# Patient Record
Sex: MALE | Race: WHITE | Employment: UNEMPLOYED | URBAN - NONMETROPOLITAN AREA
[De-identification: names, ages, dates, MRNs, and addresses within clinical notes are randomized per-mention and may not be internally consistent; named-entity substitution may affect disease eponyms.]

---

## 2018-09-28 ENCOUNTER — HOSPITAL ENCOUNTER (EMERGENCY)
Age: 38
Discharge: HOME | End: 2018-09-28
Payer: COMMERCIAL

## 2018-09-28 ENCOUNTER — OFFICE VISIT (OUTPATIENT)
Dept: URGENT CARE | Age: 38
End: 2018-09-28

## 2018-09-28 VITALS
BODY MASS INDEX: 24.78 KG/M2 | HEART RATE: 68 BPM | DIASTOLIC BLOOD PRESSURE: 95 MMHG | RESPIRATION RATE: 22 BRPM | HEIGHT: 71 IN | OXYGEN SATURATION: 97 % | SYSTOLIC BLOOD PRESSURE: 142 MMHG | TEMPERATURE: 97.8 F | WEIGHT: 177 LBS

## 2018-09-28 VITALS
BODY MASS INDEX: 24.78 KG/M2 | TEMPERATURE: 98.2 F | HEART RATE: 78 BPM | OXYGEN SATURATION: 95 % | HEIGHT: 71 IN | SYSTOLIC BLOOD PRESSURE: 158 MMHG | DIASTOLIC BLOOD PRESSURE: 98 MMHG | WEIGHT: 177 LBS | RESPIRATION RATE: 16 BRPM

## 2018-09-28 DIAGNOSIS — M54.30 BACK PAIN WITH SCIATICA: Primary | ICD-10-CM

## 2018-09-28 DIAGNOSIS — M54.9 BACK PAIN WITH SCIATICA: Primary | ICD-10-CM

## 2018-09-28 DIAGNOSIS — M54.5 LOW BACK PAIN, UNSPECIFIED BACK PAIN LATERALITY, UNSPECIFIED CHRONICITY, WITH SCIATICA PRESENCE UNSPECIFIED: Primary | ICD-10-CM

## 2018-09-28 DIAGNOSIS — R32 URINARY INCONTINENCE, UNSPECIFIED TYPE: ICD-10-CM

## 2018-09-28 LAB
BILIRUBIN URINE: NEGATIVE
BLOOD, URINE: NEGATIVE
CLARITY: CLEAR
COLOR: YELLOW
GLUCOSE URINE: NEGATIVE MG/DL
KETONES, URINE: NEGATIVE MG/DL
LEUKOCYTE ESTERASE, URINE: NEGATIVE
NITRITE, URINE: NEGATIVE
PH UA: 5.5
PROTEIN UA: NEGATIVE MG/DL
SPECIFIC GRAVITY UA: 1.01
URINE REFLEX TO CULTURE: NORMAL
UROBILINOGEN, URINE: 0.2 E.U./DL

## 2018-09-28 PROCEDURE — 99283 EMERGENCY DEPT VISIT LOW MDM: CPT

## 2018-09-28 PROCEDURE — 81003 URINALYSIS AUTO W/O SCOPE: CPT

## 2018-09-28 PROCEDURE — 51798 US URINE CAPACITY MEASURE: CPT

## 2018-09-28 PROCEDURE — 99285 EMERGENCY DEPT VISIT HI MDM: CPT | Performed by: NURSE PRACTITIONER

## 2018-09-28 PROCEDURE — 99999 PR OFFICE/OUTPT VISIT,PROCEDURE ONLY: CPT | Performed by: NURSE PRACTITIONER

## 2018-09-28 PROCEDURE — 6370000000 HC RX 637 (ALT 250 FOR IP): Performed by: NURSE PRACTITIONER

## 2018-09-28 PROCEDURE — 96372 THER/PROPH/DIAG INJ SC/IM: CPT

## 2018-09-28 PROCEDURE — 6360000002 HC RX W HCPCS: Performed by: NURSE PRACTITIONER

## 2018-09-28 RX ORDER — MORPHINE SULFATE 10 MG/ML
4 INJECTION, SOLUTION INTRAMUSCULAR; INTRAVENOUS ONCE
Status: COMPLETED | OUTPATIENT
Start: 2018-09-28 | End: 2018-09-28

## 2018-09-28 RX ORDER — METHOCARBAMOL 500 MG/1
500 TABLET, FILM COATED ORAL 3 TIMES DAILY
Qty: 20 TABLET | Refills: 0 | Status: SHIPPED | OUTPATIENT
Start: 2018-09-28 | End: 2018-10-05

## 2018-09-28 RX ORDER — DEXAMETHASONE SODIUM PHOSPHATE 10 MG/ML
10 INJECTION, SOLUTION INTRAMUSCULAR; INTRAVENOUS ONCE
Status: COMPLETED | OUTPATIENT
Start: 2018-09-28 | End: 2018-09-28

## 2018-09-28 RX ORDER — PROMETHAZINE HYDROCHLORIDE 25 MG/ML
25 INJECTION, SOLUTION INTRAMUSCULAR; INTRAVENOUS ONCE
Status: COMPLETED | OUTPATIENT
Start: 2018-09-28 | End: 2018-09-28

## 2018-09-28 RX ORDER — HYDROCODONE BITARTRATE AND ACETAMINOPHEN 5; 325 MG/1; MG/1
1 TABLET ORAL EVERY 6 HOURS PRN
Qty: 10 TABLET | Refills: 0 | Status: SHIPPED | OUTPATIENT
Start: 2018-09-28 | End: 2018-10-01

## 2018-09-28 RX ORDER — ONDANSETRON 4 MG/1
4 TABLET, ORALLY DISINTEGRATING ORAL EVERY 8 HOURS PRN
Qty: 15 TABLET | Refills: 0 | Status: SHIPPED | OUTPATIENT
Start: 2018-09-28 | End: 2018-10-13

## 2018-09-28 RX ORDER — METHYLPREDNISOLONE 4 MG/1
TABLET ORAL
Qty: 1 KIT | Refills: 0 | Status: SHIPPED | OUTPATIENT
Start: 2018-09-28 | End: 2018-10-04

## 2018-09-28 RX ORDER — LIDOCAINE 50 MG/G
1 PATCH TOPICAL DAILY
Status: DISCONTINUED | OUTPATIENT
Start: 2018-09-28 | End: 2018-09-28 | Stop reason: HOSPADM

## 2018-09-28 RX ADMIN — MORPHINE SULFATE 4 MG: 10 INJECTION INTRAVENOUS at 11:04

## 2018-09-28 RX ADMIN — DEXAMETHASONE SODIUM PHOSPHATE 10 MG: 10 INJECTION, SOLUTION INTRAMUSCULAR; INTRAVENOUS at 11:04

## 2018-09-28 RX ADMIN — PROMETHAZINE HYDROCHLORIDE 25 MG: 25 INJECTION INTRAMUSCULAR; INTRAVENOUS at 11:04

## 2018-09-28 ASSESSMENT — PAIN DESCRIPTION - ORIENTATION: ORIENTATION: RIGHT

## 2018-09-28 ASSESSMENT — PAIN SCALES - GENERAL
PAINLEVEL_OUTOF10: 6
PAINLEVEL_OUTOF10: 7

## 2018-09-28 ASSESSMENT — PAIN DESCRIPTION - LOCATION: LOCATION: BACK;LEG

## 2018-09-28 ASSESSMENT — PAIN DESCRIPTION - DESCRIPTORS: DESCRIPTORS: DULL;ACHING

## 2018-09-28 ASSESSMENT — PAIN DESCRIPTION - PAIN TYPE: TYPE: ACUTE PAIN

## 2018-09-28 ASSESSMENT — ENCOUNTER SYMPTOMS
GASTROINTESTINAL NEGATIVE: 1
RESPIRATORY NEGATIVE: 1
BACK PAIN: 1

## 2018-09-28 NOTE — PROGRESS NOTES
Gracia Lopez presents today with a complaint of lower back pain. The main concern is that he had an episode of incontinence yesterday. He has never had that before. He rates his pain currently a 7/10, but has been 10/10. Advised that patient needs a higher level of care and to go to the ER for further evaluation and treatment. His GF is with him and she agrees to drive him. He declined ambulance. Left in stable condition.

## 2018-09-28 NOTE — PROGRESS NOTES
Patient came into clinic with girlfriend, [facility]. Had c/o right side lower back pain that radiates down leg in to arch of foot. Said that symptoms started approx. 2 weeks ago. Currently rates pain at a level of 7. Said that the toes on his right foot go numb at times. Also states that he had an episode of incontinence yesterday and didn't even feel it until it happened. Obtained vital signs and consulted with provider, Luna Champion. She came and spoke with patient, recommended that he be evaluated at the E.D. Patient was agreeable to this. Declined ambulance, patient's girlfriend stated that she would drive him.  Patient left clinic in stable condition.--HC, LPN

## 2018-09-28 NOTE — ED PROVIDER NOTES
available at the time of this note:    No orders to display         ED BEDSIDE ULTRASOUND:   Performed by ED Physician - none    LABS:  Labs Reviewed   URINE RT REFLEX TO CULTURE       All other labs were within normal range or not returned as of this dictation. RE-ASSESSMENT     Pt with normal post void residual because of this due not believe pt with neurogenic bladder. Symptoms improved after medication he is ambulating prior to discharge without ataxia or antalgic gait. EMERGENCY DEPARTMENT COURSE and DIFFERENTIAL DIAGNOSIS/MDM:   Vitals:    Vitals:    09/28/18 1011   BP: (!) 158/98   Pulse: 78   Resp: 16   Temp: 98.2 °F (36.8 °C)   TempSrc: Oral   SpO2: 95%   Weight: 177 lb (80.3 kg)   Height: 5' 11\" (1.803 m)       MDM      CONSULTS:  None    PROCEDURES:  Unless otherwise noted below, none     Procedures    FINAL IMPRESSION      1. Back pain with sciatica    2. Urinary incontinence, unspecified type          DISPOSITION/PLAN   DISPOSITION        PATIENT REFERRED TO:  Jeri Anguiano [id number]  [phone]    Schedule an appointment as soon as possible for a visit         DISCHARGE MEDICATIONS:    Attestation: The Prescription Monitoring Report for this patient was reviewed today. ([id number]) MICHAEL Arce)  Documentation: No signs of potential drug abuse or diversion identified. MICHAEL Arce)  Discharge Medication List as of 9/28/2018 12:37 PM           Medication List      START taking these medications    HYDROcodone-acetaminophen 5-325 MG per tablet  Commonly known as:  NORCO  Take 1 tablet by mouth every 6 hours as needed for Pain for up to 3 days. Intended supply: 3 days. Take lowest dose possible to manage pain. methocarbamol 500 MG tablet  Commonly known as:  ROBAXIN  Take 1 tablet by mouth 3 times daily for 7 days     methylPREDNISolone 4 MG tablet  Commonly known as:  MEDROL (ALBERTO)  Take by mouth.      ondansetron 4 MG disintegrating tablet  Commonly known as:  ZOFRAN ODT  Take 1 tablet by mouth every 8 hours as needed for Nausea or Vomiting           Where to Get Your Medications      You can get these medications from any pharmacy    Bring a paper prescription for each of these medications  · HYDROcodone-acetaminophen 5-325 MG per tablet  · methocarbamol 500 MG tablet  · methylPREDNISolone 4 MG tablet  · ondansetron 4 MG disintegrating tablet         (Please note that portions of this note were completed with a voice recognition program.  Efforts were made to edit the dictations but occasionally words are mis-transcribed.)              Lakshmi Cerda, MICHAEL  09/28/18 0572

## 2018-10-24 ENCOUNTER — HOSPITAL ENCOUNTER (INPATIENT)
Age: 38
LOS: 5 days | Discharge: HOME | DRG: 897 | End: 2018-10-29
Attending: FAMILY MEDICINE | Admitting: PSYCHIATRY & NEUROLOGY
Payer: COMMERCIAL

## 2018-10-24 DIAGNOSIS — R45.851 SUICIDAL IDEATION: Primary | ICD-10-CM

## 2018-10-24 DIAGNOSIS — F32.0 CURRENT MILD EPISODE OF MAJOR DEPRESSIVE DISORDER WITHOUT PRIOR EPISODE (HCC): ICD-10-CM

## 2018-10-24 LAB
ALBUMIN SERPL-MCNC: 4.7 G/DL (ref 3.5–5.2)
ALP BLD-CCNC: 82 U/L (ref 40–130)
ALT SERPL-CCNC: 31 U/L (ref 5–41)
AMPHETAMINE SCREEN, URINE: NEGATIVE
ANION GAP SERPL CALCULATED.3IONS-SCNC: 15 MMOL/L (ref 7–19)
AST SERPL-CCNC: 21 U/L (ref 5–40)
BACTERIA: NEGATIVE /HPF
BARBITURATE SCREEN URINE: NEGATIVE
BASOPHILS ABSOLUTE: 0 K/UL (ref 0–0.2)
BASOPHILS RELATIVE PERCENT: 0.1 % (ref 0–1)
BENZODIAZEPINE SCREEN, URINE: NEGATIVE
BILIRUB SERPL-MCNC: 0.7 MG/DL (ref 0.2–1.2)
BILIRUBIN URINE: ABNORMAL
BLOOD, URINE: NEGATIVE
BUN BLDV-MCNC: 6 MG/DL (ref 6–20)
CALCIUM SERPL-MCNC: 10 MG/DL (ref 8.6–10)
CANNABINOID SCREEN URINE: NEGATIVE
CHLORIDE BLD-SCNC: 101 MMOL/L (ref 98–111)
CLARITY: ABNORMAL
CO2: 24 MMOL/L (ref 22–29)
COCAINE METABOLITE SCREEN URINE: NEGATIVE
COLOR: ABNORMAL
CREAT SERPL-MCNC: 0.9 MG/DL (ref 0.5–1.2)
EOSINOPHILS ABSOLUTE: 0.1 K/UL (ref 0–0.6)
EOSINOPHILS RELATIVE PERCENT: 1 % (ref 0–5)
EPITHELIAL CELLS, UA: 2 /HPF (ref 0–5)
ETHANOL: <10 MG/DL (ref 0–0.08)
GFR NON-AFRICAN AMERICAN: >60
GLUCOSE BLD-MCNC: 135 MG/DL (ref 74–109)
GLUCOSE URINE: NEGATIVE MG/DL
HCT VFR BLD CALC: 48.3 % (ref 42–52)
HEMOGLOBIN: 16.2 G/DL (ref 14–18)
HYALINE CASTS: 27 /HPF (ref 0–8)
KETONES, URINE: ABNORMAL MG/DL
LEUKOCYTE ESTERASE, URINE: NEGATIVE
LIPASE: 23 U/L (ref 13–60)
LYMPHOCYTES ABSOLUTE: 1.4 K/UL (ref 1.1–4.5)
LYMPHOCYTES RELATIVE PERCENT: 20.4 % (ref 20–40)
Lab: NORMAL
MCH RBC QN AUTO: 31.2 PG (ref 27–31)
MCHC RBC AUTO-ENTMCNC: 33.5 G/DL (ref 33–37)
MCV RBC AUTO: 93.1 FL (ref 80–94)
MONOCYTES ABSOLUTE: 0.7 K/UL (ref 0–0.9)
MONOCYTES RELATIVE PERCENT: 10.4 % (ref 0–10)
NEUTROPHILS ABSOLUTE: 4.6 K/UL (ref 1.5–7.5)
NEUTROPHILS RELATIVE PERCENT: 67.8 % (ref 50–65)
NITRITE, URINE: NEGATIVE
OPIATE SCREEN URINE: NEGATIVE
PDW BLD-RTO: 11.6 % (ref 11.5–14.5)
PH UA: 6
PLATELET # BLD: 271 K/UL (ref 130–400)
PMV BLD AUTO: 10.4 FL (ref 9.4–12.4)
POTASSIUM REFLEX MAGNESIUM: 3.7 MMOL/L (ref 3.5–5)
PROTEIN UA: 30 MG/DL
RBC # BLD: 5.19 M/UL (ref 4.7–6.1)
RBC UA: 3 /HPF (ref 0–4)
SODIUM BLD-SCNC: 140 MMOL/L (ref 136–145)
SPECIFIC GRAVITY UA: 1.02
TOTAL PROTEIN: 7.3 G/DL (ref 6.6–8.7)
UROBILINOGEN, URINE: 1 E.U./DL
WBC # BLD: 6.9 K/UL (ref 4.8–10.8)
WBC UA: 3 /HPF (ref 0–5)

## 2018-10-24 PROCEDURE — 36415 COLL VENOUS BLD VENIPUNCTURE: CPT

## 2018-10-24 PROCEDURE — 83690 ASSAY OF LIPASE: CPT

## 2018-10-24 PROCEDURE — 6370000000 HC RX 637 (ALT 250 FOR IP): Performed by: PSYCHIATRY & NEUROLOGY

## 2018-10-24 PROCEDURE — 81001 URINALYSIS AUTO W/SCOPE: CPT

## 2018-10-24 PROCEDURE — 1240000000 HC EMOTIONAL WELLNESS R&B

## 2018-10-24 PROCEDURE — 99285 EMERGENCY DEPT VISIT HI MDM: CPT | Performed by: FAMILY MEDICINE

## 2018-10-24 PROCEDURE — G0480 DRUG TEST DEF 1-7 CLASSES: HCPCS

## 2018-10-24 PROCEDURE — 80053 COMPREHEN METABOLIC PANEL: CPT

## 2018-10-24 PROCEDURE — 80307 DRUG TEST PRSMV CHEM ANLYZR: CPT

## 2018-10-24 PROCEDURE — 85025 COMPLETE CBC W/AUTO DIFF WBC: CPT

## 2018-10-24 PROCEDURE — 99285 EMERGENCY DEPT VISIT HI MDM: CPT

## 2018-10-24 RX ORDER — NICOTINE 21 MG/24HR
1 PATCH, TRANSDERMAL 24 HOURS TRANSDERMAL DAILY
Status: DISCONTINUED | OUTPATIENT
Start: 2018-10-24 | End: 2018-10-29 | Stop reason: HOSPADM

## 2018-10-24 RX ORDER — NICOTINE 21 MG/24HR
PATCH, TRANSDERMAL 24 HOURS TRANSDERMAL
Status: DISCONTINUED
Start: 2018-10-24 | End: 2018-10-24

## 2018-10-24 RX ORDER — ACETAMINOPHEN 325 MG/1
650 TABLET ORAL EVERY 4 HOURS PRN
Status: DISCONTINUED | OUTPATIENT
Start: 2018-10-24 | End: 2018-10-29 | Stop reason: HOSPADM

## 2018-10-24 ASSESSMENT — ENCOUNTER SYMPTOMS
WHEEZING: 0
TROUBLE SWALLOWING: 0
BACK PAIN: 0
DIARRHEA: 0
COUGH: 0
CHEST TIGHTNESS: 0
SORE THROAT: 0
SHORTNESS OF BREATH: 0
CONSTIPATION: 0
APNEA: 0
VOMITING: 0
ABDOMINAL DISTENTION: 0
ABDOMINAL PAIN: 0

## 2018-10-24 ASSESSMENT — SLEEP AND FATIGUE QUESTIONNAIRES
DIFFICULTY ARISING: YES
RESTFUL SLEEP: NO
SLEEP PATTERN: RESTLESSNESS;INSOMNIA;DIFFICULTY FALLING ASLEEP
AVERAGE NUMBER OF SLEEP HOURS: 3
DIFFICULTY STAYING ASLEEP: YES
DO YOU HAVE DIFFICULTY SLEEPING: YES
DIFFICULTY FALLING ASLEEP: YES
DO YOU USE A SLEEP AID: NO

## 2018-10-24 ASSESSMENT — PAIN DESCRIPTION - PAIN TYPE: TYPE: CHRONIC PAIN

## 2018-10-24 ASSESSMENT — LIFESTYLE VARIABLES: HISTORY_ALCOHOL_USE: YES

## 2018-10-24 ASSESSMENT — PAIN SCALES - GENERAL: PAINLEVEL_OUTOF10: 5

## 2018-10-24 ASSESSMENT — PATIENT HEALTH QUESTIONNAIRE - PHQ9: SUM OF ALL RESPONSES TO PHQ QUESTIONS 1-9: 27

## 2018-10-24 ASSESSMENT — PAIN DESCRIPTION - LOCATION: LOCATION: BACK

## 2018-10-24 NOTE — ED NOTES
Danielle Smith with behavioral health at bedside       [facility]  10/24/18 6424

## 2018-10-24 NOTE — ED PROVIDER NOTES
History:   Procedure Laterality Date    LYMPHADENECTOMY Left     removed when was a child, early 80's    OTHER SURGICAL HISTORY       [address] AS A CHILD AFTER CAT SCRATCH         CURRENT MEDICATIONS     There are no discharge medications for this patient. ALLERGIES     Patient has no known allergies. FAMILY HISTORY       Family History   Problem Relation Age of Onset    Diabetes Father     Heart Disease Father           SOCIAL HISTORY       Social History     Social History    Marital status: Single     Spouse name: N/A    Number of children: N/A    Years of education: N/A     Social History Main Topics    Smoking status: Current Every Day Smoker     Packs/day: 2.00     Types: Cigarettes    Smokeless tobacco: Never Used    Alcohol use No    Drug use: No    Sexual activity: Not Asked     Other Topics Concern    None     Social History Narrative    None       SCREENINGS    [place] Coma Scale  Eye Opening: Spontaneous  Best Verbal Response: Oriented  Best Motor Response: Obeys commands  [place] Coma Scale Score: 15        PHYSICAL EXAM    (up to 7 for level 4, 8 or more for level 5)     ED Triage Vitals [10/24/18 1111]   BP Temp Temp Source Pulse Resp SpO2 Height Weight   (!) 138/95 98.6 °F (37 °C) Oral 98 16 97 % 5' 11\" (1.803 m) 177 lb (80.3 kg)       Physical Exam   Constitutional: He is oriented to person, place, and time. He appears well-developed and well-nourished. HENT:   Head: Normocephalic and atraumatic. Eyes: Pupils are equal, round, and reactive to light. Conjunctivae and EOM are normal.   Neck: Normal range of motion. Neck supple. No tracheal deviation present. Cardiovascular: Normal rate, regular rhythm, normal heart sounds and intact distal pulses. Pulmonary/Chest: Effort normal and breath sounds normal. No respiratory distress. He has no wheezes. He has no rales. Abdominal: Soft. Bowel sounds are normal.   Musculoskeletal: Normal range of motion. MDM  Number of Diagnoses or Management Options     Amount and/or Complexity of Data Reviewed  Clinical lab tests: ordered and reviewed    Risk of Complications, Morbidity, and/or Mortality  Presenting problems: moderate  Diagnostic procedures: moderate  Management options: moderate    Patient Progress  Patient progress: stable      Reassessment  Patient stable throughout ED stay. He is medically clear. Behavioral health interviewed patient. They will admit for further treatment. CONSULTS:  IP CONSULT TO PSYCHIATRY  IP CONSULT TO FAMILY MEDICINE    PROCEDURES:  Unless otherwise noted below, none     Procedures    FINAL IMPRESSION      1. Suicidal ideation    2. Current mild episode of major depressive disorder without prior episode [facility])          DISPOSITION/PLAN   DISPOSITION Decision To Admit 10/24/2018 01:35:53 PM      PATIENT REFERRED TO:  Hilario Bennett, [facility] [address]  [phone]            DISCHARGE MEDICATIONS:  There are no discharge medications for this patient.          (Please note that portions of this note were completed with a voice recognition program.  Efforts were made to edit thedictations but occasionally words are mis-transcribed.)    Tammie Pickens MD (electronically signed)  Attending Emergency Physician          Tammie Pickens MD  10/24/18 18 Alexey Perez MD  10/25/18 2095

## 2018-10-25 PROBLEM — R45.851 SUICIDAL IDEATION: Status: ACTIVE | Noted: 2018-10-25

## 2018-10-25 PROBLEM — F19.94 SUBSTANCE INDUCED MOOD DISORDER (HCC): Status: ACTIVE | Noted: 2018-10-25

## 2018-10-25 PROCEDURE — 90792 PSYCH DIAG EVAL W/MED SRVCS: CPT | Performed by: NURSE PRACTITIONER

## 2018-10-25 PROCEDURE — 1240000000 HC EMOTIONAL WELLNESS R&B

## 2018-10-25 PROCEDURE — 6370000000 HC RX 637 (ALT 250 FOR IP): Performed by: PSYCHIATRY & NEUROLOGY

## 2018-10-25 PROCEDURE — 6370000000 HC RX 637 (ALT 250 FOR IP): Performed by: NURSE PRACTITIONER

## 2018-10-25 RX ORDER — TRAZODONE HYDROCHLORIDE 50 MG/1
50 TABLET ORAL NIGHTLY PRN
Status: DISCONTINUED | OUTPATIENT
Start: 2018-10-25 | End: 2018-10-29 | Stop reason: HOSPADM

## 2018-10-25 RX ORDER — FOLIC ACID 1 MG/1
1 TABLET ORAL DAILY
Status: DISCONTINUED | OUTPATIENT
Start: 2018-10-25 | End: 2018-10-29 | Stop reason: HOSPADM

## 2018-10-25 RX ORDER — CHLORDIAZEPOXIDE HYDROCHLORIDE 25 MG/1
25 CAPSULE, GELATIN COATED ORAL EVERY 4 HOURS PRN
Status: DISCONTINUED | OUTPATIENT
Start: 2018-10-25 | End: 2018-10-29 | Stop reason: HOSPADM

## 2018-10-25 RX ORDER — DIVALPROEX SODIUM 500 MG/1
500 TABLET, DELAYED RELEASE ORAL EVERY 12 HOURS SCHEDULED
Status: DISCONTINUED | OUTPATIENT
Start: 2018-10-25 | End: 2018-10-29 | Stop reason: HOSPADM

## 2018-10-25 RX ORDER — THIAMINE MONONITRATE (VIT B1) 100 MG
100 TABLET ORAL DAILY
Status: DISCONTINUED | OUTPATIENT
Start: 2018-10-25 | End: 2018-10-29 | Stop reason: HOSPADM

## 2018-10-25 RX ADMIN — TRAZODONE HYDROCHLORIDE 50 MG: 50 TABLET ORAL at 20:39

## 2018-10-25 RX ADMIN — DIVALPROEX SODIUM 500 MG: 500 TABLET, DELAYED RELEASE ORAL at 20:39

## 2018-10-25 RX ADMIN — FOLIC ACID 1 MG: 1 TABLET ORAL at 21:14

## 2018-10-25 RX ADMIN — Medication 100 MG: at 21:13

## 2018-10-25 NOTE — PROGRESS NOTES
Patient is calm and cooperative with care. Patient is social and attends groups. Patient completed ADLs. Patient reports he slept well. Patient rates D:5, A:5, and denies SI, HI, and AVH. During assessment patient is tearful. He reports he kept getting distracted in group due to another patient.

## 2018-10-25 NOTE — H&P
raised by his mother and father until age 6 when his parents . He was back and forth from his parents. He has 1 sister who he has no relationship with. His father passed away when the patient was 22 and he has no relationship with his mother. He graduated high school a year early. His drug abuse started in his mid teens and continued until age 22. He was sober for 6 months then began abusing alcohol. He has abused alcohol since then and reports up until last month he was drinking 15 beers per day. He currently lives alone. Historian: patient  Complaint Type: anxiety, depression, excessive alcohol consumption, loss of interest in favorite activities, sleep disturbance and tobacco use  Course of Symptoms: ongoing  Symptoms Onset: gradual  Onset Approximately: gradual  Precipitating Factors: not working, ETOH abuse, GF left him  Severity: moderate  Risk Factors:   History of mental illness  Allergies:    Allergies as of 10/24/2018    (No Known Allergies)       Vital Signs:  Last set of tests and vitals:  Vitals:    10/25/18 0806   BP: 103/63   Pulse: 96   Resp: 20   Temp: 98.4 °F (36.9 °C)   SpO2: 96%     Labs Reviewed   CBC WITH AUTO DIFFERENTIAL - Abnormal; Notable for the following:        Result Value    MCH 31.2 (*)     Neutrophils % 67.8 (*)     Monocytes % 10.4 (*)     All other components within normal limits   COMPREHENSIVE METABOLIC PANEL W/ REFLEX TO MG FOR LOW K - Abnormal; Notable for the following:     Glucose 135 (*)     All other components within normal limits   URINALYSIS - Abnormal; Notable for the following:     Clarity, UA CLOUDY (*)     Bilirubin Urine SMALL (*)     Ketones, Urine TRACE (*)     Protein, UA 30 (*)     All other components within normal limits   MICROSCOPIC URINALYSIS - Abnormal; Notable for the following:     Hyaline Casts, UA 27 (*)     All other components within normal limits   LIPASE   URINE DRUG SCREEN   ETHANOL       Current Medications:   Current street clothes, in chair, good grooming and good hygiene  Behavior/Motor:  no abnormalities noted  Attitude toward examiner:  cooperative, attentive and good eye contact  Speech:  normal rate and normal volume  Mood:    Affect:  flat  Thought processes:  linear and goal directed  Thought content:  Homocidal ideation :denies  Suicidal Ideation:  active  Delusions:  no evidence of delusions  Perceptual Disturbance:  denies any perceptual disturbance  Cognition:  oriented to person, place, and time  Concentration : \"not great. \"  Memory intact for recent and remote  Fund of knowledge:  average  Abstract thinking:  adequate  Insight:  poor  Judgment:  fair        Review of Systems:  History obtained from the patient  General ROS: positive for  - sleep disturbance  Psychological ROS: positive for - anxiety, depression, sleep disturbances and suicidal ideation  Ophthalmic ROS: negative  ENT ROS: negative  Allergy and Immunology ROS: negative  Hematological and Lymphatic ROS: negative  Endocrine ROS: negative  Breast ROS: negative  Respiratory ROS: negative  Cardiovascular ROS: negative  Gastrointestinal ROS: negative  Genito-Urinary ROS: negative  Musculoskeletal ROS: negative  Neurological ROS: negative  Dermatological ROS: negative      DSM V Diagnoses:    Substance induced mood disorder (HCC)      ELOS 3-5 days          Recommendations:  1. Admit to [facility] Unit and monitor on 15 min checks  2. Adriano Anger to be reviewed. 3. Collateral information from family with release  4. Medical monitoring by Dr Anita Shelton and associates  5. Acclimate to the unit and encourage group attendance   6. Legal Status: Voluntary  7. Precautions: Suicide  8. Diet: Regular  9. Depakote 500 mg po BID- mood stabilization  10. Disposition: social work consulted    6. Nicotine patch 21 mg transdermal daily- smoking cessation medication  12. HGBA1C  13.  LIPID PANEL      MICHAEL Stone

## 2018-10-25 NOTE — PROGRESS NOTES
Treatment Team Note:    ASHLEY met with [address] team to discuss Pts [address] plans. Progress/Behavior/Group Attendance: attending    Target Symptoms/Reason for admission: Patient admitted to [facility] due to [address] trying to get help for depression and anxiety, [facility] they have no openings for another month, and longer to see a doctor.  Admits to having thoughts of suicide    Diagnoses: Depression NOS    UDS: Neg-     BAL: Neg    AftercarePlan: [address] lives with: SW will meet with pt to gather information. Collateral obtained from: SW will meet with pt to gather release of information.   On:    Family Session: RAFAEL    Misc:

## 2018-10-25 NOTE — PROGRESS NOTES
Requirement Note     SW met with pt to complete Psychosocial within 72 hours, CSSRS within 24 hours, and treatment plan signature sheet within 72 hours. In the last 6 months has the pt been a danger to self: YES/  In the last 6 months has the pt been a danger to others: NO    Provided pt with [employer] handout entitled \"Quitting Smoking. \"  Reviewed handout with pt addressing dangers of smoking, developing coping skills, and providing basic information about quitting. Patient received all components practical counseling of tobacco practical counseling during the hospital stay.

## 2018-10-26 LAB
HBA1C MFR BLD: 5.4 % (ref 4–6)
TSH SERPL DL<=0.05 MIU/L-ACNC: 1.6 UIU/ML (ref 0.27–4.2)
VITAMIN B-12: 307 PG/ML (ref 211–946)
VITAMIN D 25-HYDROXY: 20.2 NG/ML

## 2018-10-26 PROCEDURE — 99231 SBSQ HOSP IP/OBS SF/LOW 25: CPT | Performed by: NURSE PRACTITIONER

## 2018-10-26 PROCEDURE — 84443 ASSAY THYROID STIM HORMONE: CPT

## 2018-10-26 PROCEDURE — 83036 HEMOGLOBIN GLYCOSYLATED A1C: CPT

## 2018-10-26 PROCEDURE — 82607 VITAMIN B-12: CPT

## 2018-10-26 PROCEDURE — 6370000000 HC RX 637 (ALT 250 FOR IP): Performed by: NURSE PRACTITIONER

## 2018-10-26 PROCEDURE — 6370000000 HC RX 637 (ALT 250 FOR IP): Performed by: PSYCHIATRY & NEUROLOGY

## 2018-10-26 PROCEDURE — 36415 COLL VENOUS BLD VENIPUNCTURE: CPT

## 2018-10-26 PROCEDURE — 6370000000 HC RX 637 (ALT 250 FOR IP): Performed by: FAMILY MEDICINE

## 2018-10-26 PROCEDURE — 82306 VITAMIN D 25 HYDROXY: CPT

## 2018-10-26 PROCEDURE — 1240000000 HC EMOTIONAL WELLNESS R&B

## 2018-10-26 RX ORDER — ERGOCALCIFEROL 1.25 MG/1
50000 CAPSULE ORAL WEEKLY
Status: DISCONTINUED | OUTPATIENT
Start: 2018-10-26 | End: 2018-10-29 | Stop reason: HOSPADM

## 2018-10-26 RX ORDER — CHOLECALCIFEROL (VITAMIN D3) 125 MCG
500 CAPSULE ORAL DAILY
Status: DISCONTINUED | OUTPATIENT
Start: 2018-10-26 | End: 2018-10-29 | Stop reason: HOSPADM

## 2018-10-26 RX ORDER — ERGOCALCIFEROL (VITAMIN D2) 1250 MCG
50000 CAPSULE ORAL WEEKLY
Qty: 11 CAPSULE | Refills: 0 | Status: SHIPPED | OUTPATIENT
Start: 2018-10-26 | End: 2018-10-29

## 2018-10-26 RX ADMIN — FOLIC ACID 1 MG: 1 TABLET ORAL at 08:50

## 2018-10-26 RX ADMIN — DIVALPROEX SODIUM 500 MG: 500 TABLET, DELAYED RELEASE ORAL at 08:50

## 2018-10-26 RX ADMIN — TRAZODONE HYDROCHLORIDE 50 MG: 50 TABLET ORAL at 20:50

## 2018-10-26 RX ADMIN — Medication 100 MG: at 08:51

## 2018-10-26 RX ADMIN — ERGOCALCIFEROL 50000 UNITS: 1.25 CAPSULE ORAL at 09:33

## 2018-10-26 RX ADMIN — DIVALPROEX SODIUM 500 MG: 500 TABLET, DELAYED RELEASE ORAL at 20:49

## 2018-10-26 RX ADMIN — CYANOCOBALAMIN TAB 500 MCG 500 MCG: 500 TAB at 09:33

## 2018-10-26 NOTE — PROGRESS NOTES
and was there for 4 months. He reports Zoloft makes him very angry and agitated and antidepressants do not benefit him. Subjective  Patient reports sleep as \"better\" about 4 hours. He denies SI, HI or psychosis at this time. He reports that he feels more \"high strung\" today. He reports ETOH withdrawal as night sweats. CIWAS have been 4 so far. He reports having a headache today. Patient has been calm and cooperative with staff and peers. Patient has been compliant with medications. Patient has been attending groups. Patient reports appetite as improving. Patient reports no side effects from medications. Previous Psychiatric/Substance Use History      Medical History:  Past Medical History:   Diagnosis Date    Anxiety     Depression     Panic attack     [place] spotted fever 2005        GAR History:   History   Alcohol Use No         History   Drug Use No        History   Smoking Status    Current Every Day Smoker    Packs/day: 2.00    Types: Cigarettes   Smokeless Tobacco    Never Used        Family History:     Family History   Problem Relation Age of Onset    Diabetes Father     Heart Disease Father          Vital Signs:  Last set of tests and vitals:  Vitals:    10/25/18 2012   BP: 111/75   Pulse: 88   Resp: 15   Temp: 97.8 °F (36.6 °C)   SpO2: 98%          Mental Status:  Level of consciousness:  within normal limits and awake  Appearance:  well-appearing, street clothes, in chair, good grooming and good hygiene  Behavior/Motor:  no abnormalities noted  Attitude toward examiner:  cooperative, attentive and good eye contact  Speech:  normal rate and normal volume  Mood:  \"I had some night sweats last night. \"  Affect:  mood congruent  Thought processes:  linear and goal directed  Thought content:  Homocidal ideation : denies  Suicidal Ideation:  denies suicidal ideation  Delusions:  no evidence of delusions  Perceptual Disturbance:  denies any perceptual disturbance  Cognition:

## 2018-10-26 NOTE — PROGRESS NOTES
Group Therapy Note    Date: 10/26/2018  Start Time: 0800  End Time:  0830  Number of Participants: 21    Type of Group: Community Meeting        Patient's Goal:  Try to participate more in group and talk to people more        Status After Intervention:  Unchanged    Participation Level:  Active Listener    Participation Quality: Appropriate, Attentive, Sharing and Supportive      Speech:  normal      Thought Process/Content: Logical  Linear      Affective Functioning: Congruent      Mood: elevated      Level of consciousness:  Alert, Oriented x4 and Attentive      Response to Learning: Able to verbalize current knowledge/experience, Able to verbalize/acknowledge new learning, Able to retain information, Capable of insight, Able to change behavior and Progressing to goal      Endings: None Reported    Modes of Intervention: Support      Discipline Responsible: [facility] [address] [facility] [employer]      Signature:  Heri Browne RN

## 2018-10-26 NOTE — H&P
rashes  Joints: no redness    DATA:  I have reviewed the admission labs and imaging tests.     ASSESSMENT AND PLAN:      Patient Active Hospital Problem List:   Substance induced mood disorder, SI--follow with Psych   Hyperglycemia----check HA1C   Chronic Back Pain---supportive care        Ernie Shultz MD  8:49 PM 10/25/2018

## 2018-10-27 ENCOUNTER — APPOINTMENT (OUTPATIENT)
Dept: GENERAL RADIOLOGY | Age: 38
DRG: 897 | End: 2018-10-27
Payer: COMMERCIAL

## 2018-10-27 PROCEDURE — 6370000000 HC RX 637 (ALT 250 FOR IP): Performed by: PSYCHIATRY & NEUROLOGY

## 2018-10-27 PROCEDURE — 6370000000 HC RX 637 (ALT 250 FOR IP): Performed by: FAMILY MEDICINE

## 2018-10-27 PROCEDURE — 1240000000 HC EMOTIONAL WELLNESS R&B

## 2018-10-27 PROCEDURE — 6360000002 HC RX W HCPCS: Performed by: PSYCHIATRY & NEUROLOGY

## 2018-10-27 PROCEDURE — 99231 SBSQ HOSP IP/OBS SF/LOW 25: CPT | Performed by: PSYCHIATRY & NEUROLOGY

## 2018-10-27 PROCEDURE — 6370000000 HC RX 637 (ALT 250 FOR IP): Performed by: NURSE PRACTITIONER

## 2018-10-27 RX ORDER — KETOROLAC TROMETHAMINE 30 MG/ML
15 INJECTION, SOLUTION INTRAMUSCULAR; INTRAVENOUS ONCE
Status: COMPLETED | OUTPATIENT
Start: 2018-10-27 | End: 2018-10-27

## 2018-10-27 RX ORDER — IBUPROFEN 600 MG/1
600 TABLET ORAL EVERY 6 HOURS PRN
Status: DISCONTINUED | OUTPATIENT
Start: 2018-10-27 | End: 2018-10-28

## 2018-10-27 RX ORDER — CYCLOBENZAPRINE HCL 10 MG
10 TABLET ORAL 3 TIMES DAILY PRN
Status: DISCONTINUED | OUTPATIENT
Start: 2018-10-27 | End: 2018-10-29 | Stop reason: HOSPADM

## 2018-10-27 RX ADMIN — TRAZODONE HYDROCHLORIDE 50 MG: 50 TABLET ORAL at 20:33

## 2018-10-27 RX ADMIN — KETOROLAC TROMETHAMINE 15 MG: 30 INJECTION, SOLUTION INTRAMUSCULAR; INTRAVENOUS at 18:41

## 2018-10-27 RX ADMIN — IBUPROFEN 600 MG: 600 TABLET ORAL at 17:56

## 2018-10-27 RX ADMIN — Medication 100 MG: at 08:24

## 2018-10-27 RX ADMIN — DIVALPROEX SODIUM 500 MG: 500 TABLET, DELAYED RELEASE ORAL at 08:25

## 2018-10-27 RX ADMIN — CYANOCOBALAMIN TAB 500 MCG 500 MCG: 500 TAB at 08:24

## 2018-10-27 RX ADMIN — CYCLOBENZAPRINE 10 MG: 10 TABLET, FILM COATED ORAL at 19:04

## 2018-10-27 RX ADMIN — FOLIC ACID 1 MG: 1 TABLET ORAL at 08:24

## 2018-10-27 RX ADMIN — DIVALPROEX SODIUM 500 MG: 500 TABLET, DELAYED RELEASE ORAL at 20:33

## 2018-10-27 ASSESSMENT — PAIN SCALES - GENERAL
PAINLEVEL_OUTOF10: 8
PAINLEVEL_OUTOF10: 8
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
## 2018-10-27 NOTE — BH NOTE
Pt c/o back pain & sciatic nerve pain; pt refused PRN Tylenol.  Discussed with Dr. Donny Babin consult placed to PT for eval.
options below for   disposition:     1.  Decision to admit to [facility]:   VOLUNTARY:    Adult        I     Checklist for WILLIE staff:   Legal signed:  yes  Admission completed except as noted:  yes  Insurance Precert:     Carlis Peabody, RN

## 2018-10-28 ENCOUNTER — APPOINTMENT (OUTPATIENT)
Dept: GENERAL RADIOLOGY | Age: 38
DRG: 897 | End: 2018-10-28
Payer: COMMERCIAL

## 2018-10-28 PROCEDURE — 1240000000 HC EMOTIONAL WELLNESS R&B

## 2018-10-28 PROCEDURE — 6370000000 HC RX 637 (ALT 250 FOR IP): Performed by: NURSE PRACTITIONER

## 2018-10-28 PROCEDURE — 6370000000 HC RX 637 (ALT 250 FOR IP): Performed by: PSYCHIATRY & NEUROLOGY

## 2018-10-28 PROCEDURE — 6370000000 HC RX 637 (ALT 250 FOR IP): Performed by: FAMILY MEDICINE

## 2018-10-28 PROCEDURE — 72100 X-RAY EXAM L-S SPINE 2/3 VWS: CPT

## 2018-10-28 RX ORDER — IBUPROFEN 400 MG/1
800 TABLET ORAL EVERY 8 HOURS PRN
Status: DISCONTINUED | OUTPATIENT
Start: 2018-10-28 | End: 2018-10-29 | Stop reason: HOSPADM

## 2018-10-28 RX ORDER — IBUPROFEN 400 MG/1
800 TABLET ORAL EVERY 6 HOURS PRN
Status: DISCONTINUED | OUTPATIENT
Start: 2018-10-28 | End: 2018-10-28

## 2018-10-28 RX ADMIN — FOLIC ACID 1 MG: 1 TABLET ORAL at 09:08

## 2018-10-28 RX ADMIN — CYCLOBENZAPRINE 10 MG: 10 TABLET, FILM COATED ORAL at 20:52

## 2018-10-28 RX ADMIN — DIVALPROEX SODIUM 500 MG: 500 TABLET, DELAYED RELEASE ORAL at 09:10

## 2018-10-28 RX ADMIN — Medication 100 MG: at 09:08

## 2018-10-28 RX ADMIN — TRAZODONE HYDROCHLORIDE 50 MG: 50 TABLET ORAL at 20:52

## 2018-10-28 RX ADMIN — CYANOCOBALAMIN TAB 500 MCG 500 MCG: 500 TAB at 09:08

## 2018-10-28 RX ADMIN — DIVALPROEX SODIUM 500 MG: 500 TABLET, DELAYED RELEASE ORAL at 20:52

## 2018-10-28 RX ADMIN — IBUPROFEN 800 MG: 400 TABLET ORAL at 20:52

## 2018-10-28 ASSESSMENT — PAIN DESCRIPTION - LOCATION: LOCATION: BACK

## 2018-10-28 ASSESSMENT — PAIN SCALES - GENERAL
PAINLEVEL_OUTOF10: 4
PAINLEVEL_OUTOF10: 2

## 2018-10-28 NOTE — PLAN OF CARE
Problem: Depressive Behavior With or Without Suicide Precautions:  Goal: Able to verbalize acceptance of life and situations over which he or she has no control  Able to verbalize acceptance of life and situations over which he or she has no control    Outcome: Ongoing                                                                      Group Therapy Note      Date: 10/26/2018      Start Time: 1430  End Time:  3139  Number of Participants: 14     Type of Group: Psychoeducation     Wellness Binder Information  Module Name:  Stress  Session Number:  4     Patient's Goal:  Patient will be able to use relaxation techniques to cope with stress.      Notes:  Patient demonstrated ability to use relaxation techniques to cope with stress. Patient continues to work on depressive symptoms.      Status After Intervention:  Unchanged     Participation Level:  Active Listener     Participation Quality: Appropriate        Speech:  normal        Thought Process/Content: Logical        Affective Functioning: Congruent        Mood: depressed        Level of consciousness:  Alert        Response to Learning: Able to verbalize/acknowledge new learning        Endings: None Reported     Modes of Intervention: Education        Discipline Responsible: /Counselor        Signature:  JUICE De Oliveira       Signature:  JUICE De Oliveira      Problem: Altered Mood, Depressive Behavior:  Goal: Able to verbalize acceptance of life and situations over which he or she has no control  Able to verbalize acceptance of life and situations over which he or she has no control    Outcome: Ongoing                                                                      Group Therapy Note      Date: 10/26/2018      Start Time: 1430  End Time:  6197  Number of Participants: 14     Type of Group: Psychoeducation     Wellness Binder Information  Module Name:  Stress  Session Number:  4     Patient's Goal:  Patient will be able to use relaxation
Problem: Depressive Behavior With or Without Suicide Precautions:  Goal: Able to verbalize acceptance of life and situations over which he or she has no control  Able to verbalize acceptance of life and situations over which he or she has no control   Outcome: Ongoing                                                                      Group Therapy Note    Date: 10/25/2018  Start Time: 1000  End Time:  1045  Number of Participants: 15    Type of Group: Psychotherapy    Patient's Goal: Patient will process emotions and actions and how to relate to other or their with others. Patient discussed open communication and feelings and emotions. Notes:  Patient attended process group as scheduled, patient identified a issue to work on today regarding how they will interact and relate to others. Status After Intervention:  Improved    Participation Level:  Active Listener    Participation Quality: Appropriate, Attentive and Sharing      Speech:  normal      Thought Process/Content: Logical      Affective Functioning: Congruent      Mood: euthymic      Level of consciousness:  Alert      Response to Learning: Able to verbalize current knowledge/experience      Endings: None Reported    Modes of Intervention: Education, Support and Socialization      Discipline Responsible: /Counselor      Signature:  Angelika Foster [facility]
Problem: Depressive Behavior With or Without Suicide Precautions:  Goal: Able to verbalize acceptance of life and situations over which he or she has no control  Able to verbalize acceptance of life and situations over which he or she has no control   Outcome: Ongoing                                                                      Group Therapy Note    Date: 10/25/2018  Start Time: 1100  End Time:  1200  Number of Participants: 11    Type of Group: Psychoeducation    Wellness Binder Information  Module Name:  Women's Issues  Session Number:  4    Patient's Goal:  To gain a better understanding of how thoughts and feelings are connected    Notes:  Pt demonstrated improved understanding of how thoughts and feelings are connected by actively participating in group discussion.     Status After Intervention:  Unchanged    Participation Level: Interactive    Participation Quality: Attentive      Speech:  normal      Thought Process/Content: Logical      Affective Functioning: Congruent      Mood: anxious and depressed      Level of consciousness:  Alert and Oriented x4      Response to Learning: Able to verbalize current knowledge/experience, Able to verbalize/acknowledge new learning and Progressing to goal      Endings: None Reported    Modes of Intervention: Education      Discipline Responsible: Psychoeducational Specialist      Signature:  Deanne Garcia    Problem: Altered Mood, Depressive Behavior:  Goal: Able to verbalize acceptance of life and situations over which he or she has no control  Able to verbalize acceptance of life and situations over which he or she has no control   Outcome: Ongoing                                                                      Group Therapy Note    Date: 10/25/2018  Start Time: 1100  End Time:  1200  Number of Participants: 11    Type of Group: Psychoeducation    Wellness Binder Information  Module Name:  Women's Issues  Session Number:  4    Patient's Goal:  To gain a
Problem: Depressive Behavior With or Without Suicide Precautions:  Goal: Absence of self-harm  Absence of self-harm    Outcome: Ongoing      Problem: Pain:  Goal: Pain level will decrease  Pain level will decrease   Outcome: Ongoing      Problem: Altered Mood, Depressive Behavior:  Goal: Absence of self-harm  Absence of self-harm    Outcome: Ongoing
management techniques of stress. Status After Intervention:  Unchanged    Participation Level:  Active Listener    Participation Quality: Appropriate      Speech:  normal      Thought Process/Content: Logical      Affective Functioning: Congruent      Mood: depressed      Level of consciousness:  Alert      Response to Learning: Able to verbalize current knowledge/experience      Endings: None Reported    Modes of Intervention: Education      Discipline Responsible: /Counselor      Signature:  JUICE Fish

## 2018-10-29 VITALS
HEART RATE: 90 BPM | RESPIRATION RATE: 20 BRPM | WEIGHT: 166.4 LBS | OXYGEN SATURATION: 97 % | HEIGHT: 72 IN | SYSTOLIC BLOOD PRESSURE: 128 MMHG | DIASTOLIC BLOOD PRESSURE: 64 MMHG | TEMPERATURE: 98.2 F | BODY MASS INDEX: 22.54 KG/M2

## 2018-10-29 LAB — VALPROIC ACID LEVEL: 57.6 UG/ML (ref 50–100)

## 2018-10-29 PROCEDURE — 6370000000 HC RX 637 (ALT 250 FOR IP): Performed by: NURSE PRACTITIONER

## 2018-10-29 PROCEDURE — 5130000000 HC BRIDGE APPOINTMENT

## 2018-10-29 PROCEDURE — 6370000000 HC RX 637 (ALT 250 FOR IP): Performed by: PSYCHIATRY & NEUROLOGY

## 2018-10-29 PROCEDURE — 36415 COLL VENOUS BLD VENIPUNCTURE: CPT

## 2018-10-29 PROCEDURE — 80164 ASSAY DIPROPYLACETIC ACD TOT: CPT

## 2018-10-29 PROCEDURE — 99238 HOSP IP/OBS DSCHRG MGMT 30/<: CPT | Performed by: NURSE PRACTITIONER

## 2018-10-29 PROCEDURE — 6370000000 HC RX 637 (ALT 250 FOR IP): Performed by: FAMILY MEDICINE

## 2018-10-29 RX ORDER — DIVALPROEX SODIUM 500 MG/1
500 TABLET, DELAYED RELEASE ORAL EVERY 12 HOURS SCHEDULED
Qty: 28 TABLET | Refills: 0 | Status: ON HOLD | OUTPATIENT
Start: 2018-10-29 | End: 2018-11-20 | Stop reason: HOSPADM

## 2018-10-29 RX ORDER — FOLIC ACID 1 MG/1
1 TABLET ORAL DAILY
Qty: 30 TABLET | Refills: 0 | Status: ON HOLD | OUTPATIENT
Start: 2018-10-30 | End: 2021-09-09 | Stop reason: HOSPADM

## 2018-10-29 RX ORDER — TRAZODONE HYDROCHLORIDE 50 MG/1
50 TABLET ORAL NIGHTLY PRN
Qty: 14 TABLET | Refills: 0 | Status: ON HOLD | OUTPATIENT
Start: 2018-10-29 | End: 2018-11-20 | Stop reason: HOSPADM

## 2018-10-29 RX ORDER — ERGOCALCIFEROL (VITAMIN D2) 1250 MCG
50000 CAPSULE ORAL WEEKLY
Qty: 10 CAPSULE | Refills: 0 | Status: ON HOLD | OUTPATIENT
Start: 2018-10-29 | End: 2021-09-09 | Stop reason: HOSPADM

## 2018-10-29 RX ORDER — CYCLOBENZAPRINE HCL 10 MG
10 TABLET ORAL 3 TIMES DAILY PRN
Qty: 30 TABLET | Refills: 0 | Status: SHIPPED | OUTPATIENT
Start: 2018-10-29 | End: 2018-11-08

## 2018-10-29 RX ADMIN — Medication 100 MG: at 08:27

## 2018-10-29 RX ADMIN — DIVALPROEX SODIUM 500 MG: 500 TABLET, DELAYED RELEASE ORAL at 08:27

## 2018-10-29 RX ADMIN — FOLIC ACID 1 MG: 1 TABLET ORAL at 08:27

## 2018-10-29 RX ADMIN — CYCLOBENZAPRINE 10 MG: 10 TABLET, FILM COATED ORAL at 08:29

## 2018-10-29 RX ADMIN — CYANOCOBALAMIN TAB 500 MCG 500 MCG: 500 TAB at 08:27

## 2018-10-29 NOTE — PROGRESS NOTES
Discharge Note     Pt discharging on this date. Pt denies SI, HI, and AVH at this time. Pt reports improvement in behavior and is leaving unit in overall good condition. SW and pt discussed pt's follow up appointments and importance of attending appointments as scheduled, pt voiced understanding and agreement. Pt and SW also discussed pt safety plan and pt able to verbally identify: warning signs, coping strategies, places and people that help make the pt feel better/distract negative thoughts, friends/family/agencies/professionals the pt can reach out to in a crisis, and something that is important to the pt/worth living for. Pt provided the national suicide prevention hotline number (1-513-585-3480) as well as local community behavioral health [facility]) crisis number for emergencies (3-801-044-928-009-2316). Pt to follow up with:  [address] [place] [address]) on _11_/1__/18 @ 8:00am. Patient will follow up with Dr. Houston Marinelli at [facility] for medication management, patient will be seen on 11__/06__/18 @ 4:00pm for the med management appt. Referral to out patient tobacco cessation counseling treatment:    Patient refused tobacco cessation counseling    SW offered to assist pt with transportation, pt reports that he has transportation.

## 2018-10-29 NOTE — PROGRESS NOTES
Group Therapy Note    Date: 10/28/2018  Start Time: 2030  End Time:  2045  Number of Participants: 20    Type of Group: Wrap-Up    Wellness Binder Information  Module Name:    Session Number:      Patient's Goal:      Notes:  Filled out wrap up sheet    Status After Intervention:      Participation Level:     Participation Quality:       Speech:         Thought Process/Content:       Affective Functioning:       Mood:       Level of consciousness:        Response to Learning:     Endings:     Modes of Intervention:       Discipline Responsible: [address] [place] [address]      Signature:  Nikki Caicedo

## 2018-11-15 ENCOUNTER — HOSPITAL ENCOUNTER (INPATIENT)
Age: 38
LOS: 5 days | Discharge: HOME | DRG: 885 | End: 2018-11-20
Attending: PSYCHIATRY & NEUROLOGY | Admitting: PSYCHIATRY & NEUROLOGY
Payer: COMMERCIAL

## 2018-11-15 DIAGNOSIS — F33.0 MILD EPISODE OF RECURRENT MAJOR DEPRESSIVE DISORDER (HCC): ICD-10-CM

## 2018-11-15 DIAGNOSIS — F32.A DEPRESSION WITH SUICIDAL IDEATION: Primary | ICD-10-CM

## 2018-11-15 DIAGNOSIS — R45.851 DEPRESSION WITH SUICIDAL IDEATION: Primary | ICD-10-CM

## 2018-11-15 LAB
ALBUMIN SERPL-MCNC: 4.6 G/DL (ref 3.5–5.2)
ALP BLD-CCNC: 83 U/L (ref 40–130)
ALT SERPL-CCNC: 20 U/L (ref 5–41)
AMPHETAMINE SCREEN, URINE: NEGATIVE
ANION GAP SERPL CALCULATED.3IONS-SCNC: 12 MMOL/L (ref 7–19)
APTT: 28.1 SEC (ref 26–36.2)
AST SERPL-CCNC: 16 U/L (ref 5–40)
BARBITURATE SCREEN URINE: NEGATIVE
BASOPHILS ABSOLUTE: 0 K/UL (ref 0–0.2)
BASOPHILS RELATIVE PERCENT: 0.3 % (ref 0–1)
BENZODIAZEPINE SCREEN, URINE: NEGATIVE
BILIRUB SERPL-MCNC: 0.4 MG/DL (ref 0.2–1.2)
BUN BLDV-MCNC: 9 MG/DL (ref 6–20)
CALCIUM SERPL-MCNC: 9.9 MG/DL (ref 8.6–10)
CANNABINOID SCREEN URINE: NEGATIVE
CHLORIDE BLD-SCNC: 105 MMOL/L (ref 98–111)
CO2: 22 MMOL/L (ref 22–29)
COCAINE METABOLITE SCREEN URINE: NEGATIVE
CREAT SERPL-MCNC: 0.7 MG/DL (ref 0.5–1.2)
EOSINOPHILS ABSOLUTE: 0.1 K/UL (ref 0–0.6)
EOSINOPHILS RELATIVE PERCENT: 0.8 % (ref 0–5)
ETHANOL: <10 MG/DL (ref 0–0.08)
GFR NON-AFRICAN AMERICAN: >60
GLUCOSE BLD-MCNC: 108 MG/DL (ref 74–109)
HCT VFR BLD CALC: 49.7 % (ref 42–52)
HEMOGLOBIN: 16.4 G/DL (ref 14–18)
INR BLD: 0.92 (ref 0.88–1.18)
LYMPHOCYTES ABSOLUTE: 2.2 K/UL (ref 1.1–4.5)
LYMPHOCYTES RELATIVE PERCENT: 19.2 % (ref 20–40)
Lab: NORMAL
MCH RBC QN AUTO: 30.8 PG (ref 27–31)
MCHC RBC AUTO-ENTMCNC: 33 G/DL (ref 33–37)
MCV RBC AUTO: 93.4 FL (ref 80–94)
MONOCYTES ABSOLUTE: 0.9 K/UL (ref 0–0.9)
MONOCYTES RELATIVE PERCENT: 8.1 % (ref 0–10)
NEUTROPHILS ABSOLUTE: 8.2 K/UL (ref 1.5–7.5)
NEUTROPHILS RELATIVE PERCENT: 71.3 % (ref 50–65)
OPIATE SCREEN URINE: NEGATIVE
PDW BLD-RTO: 11.8 % (ref 11.5–14.5)
PLATELET # BLD: 377 K/UL (ref 130–400)
PMV BLD AUTO: 11.1 FL (ref 9.4–12.4)
POTASSIUM REFLEX MAGNESIUM: 4 MMOL/L (ref 3.5–5)
PROTHROMBIN TIME: 12.3 SEC (ref 12–14.6)
RBC # BLD: 5.32 M/UL (ref 4.7–6.1)
SODIUM BLD-SCNC: 139 MMOL/L (ref 136–145)
TOTAL PROTEIN: 7.5 G/DL (ref 6.6–8.7)
VALPROIC ACID LEVEL: 24 UG/ML (ref 50–100)
WBC # BLD: 11.5 K/UL (ref 4.8–10.8)

## 2018-11-15 PROCEDURE — 36415 COLL VENOUS BLD VENIPUNCTURE: CPT

## 2018-11-15 PROCEDURE — 80053 COMPREHEN METABOLIC PANEL: CPT

## 2018-11-15 PROCEDURE — 85610 PROTHROMBIN TIME: CPT

## 2018-11-15 PROCEDURE — 99285 EMERGENCY DEPT VISIT HI MDM: CPT

## 2018-11-15 PROCEDURE — G0480 DRUG TEST DEF 1-7 CLASSES: HCPCS

## 2018-11-15 PROCEDURE — 85025 COMPLETE CBC W/AUTO DIFF WBC: CPT

## 2018-11-15 PROCEDURE — 80164 ASSAY DIPROPYLACETIC ACD TOT: CPT

## 2018-11-15 PROCEDURE — 1240000000 HC EMOTIONAL WELLNESS R&B

## 2018-11-15 PROCEDURE — 6370000000 HC RX 637 (ALT 250 FOR IP): Performed by: PSYCHIATRY & NEUROLOGY

## 2018-11-15 PROCEDURE — 80307 DRUG TEST PRSMV CHEM ANLYZR: CPT

## 2018-11-15 PROCEDURE — 85730 THROMBOPLASTIN TIME PARTIAL: CPT

## 2018-11-15 RX ORDER — TRAZODONE HYDROCHLORIDE 50 MG/1
50 TABLET ORAL ONCE
Status: COMPLETED | OUTPATIENT
Start: 2018-11-15 | End: 2018-11-15

## 2018-11-15 RX ORDER — ACETAMINOPHEN 325 MG/1
650 TABLET ORAL EVERY 4 HOURS PRN
Status: DISCONTINUED | OUTPATIENT
Start: 2018-11-15 | End: 2018-11-20 | Stop reason: HOSPADM

## 2018-11-15 RX ADMIN — TRAZODONE HYDROCHLORIDE 50 MG: 50 TABLET ORAL at 22:14

## 2018-11-15 ASSESSMENT — SLEEP AND FATIGUE QUESTIONNAIRES
SLEEP PATTERN: INSOMNIA;DISTURBED/INTERRUPTED SLEEP;DIFFICULTY FALLING ASLEEP;RESTLESSNESS
DO YOU HAVE DIFFICULTY SLEEPING: YES
DO YOU USE A SLEEP AID: NO
DIFFICULTY STAYING ASLEEP: YES
RESTFUL SLEEP: NO
DIFFICULTY FALLING ASLEEP: YES
AVERAGE NUMBER OF SLEEP HOURS: 4
DIFFICULTY ARISING: YES

## 2018-11-15 ASSESSMENT — LIFESTYLE VARIABLES: HISTORY_ALCOHOL_USE: YES

## 2018-11-16 PROBLEM — F17.200 TOBACCO DEPENDENCE: Status: ACTIVE | Noted: 2018-11-16

## 2018-11-16 PROBLEM — F33.9 MAJOR DEPRESSION, RECURRENT (HCC): Status: ACTIVE | Noted: 2018-11-16

## 2018-11-16 PROCEDURE — 6370000000 HC RX 637 (ALT 250 FOR IP): Performed by: PSYCHIATRY & NEUROLOGY

## 2018-11-16 PROCEDURE — 1240000000 HC EMOTIONAL WELLNESS R&B

## 2018-11-16 PROCEDURE — 90792 PSYCH DIAG EVAL W/MED SRVCS: CPT | Performed by: PSYCHIATRY & NEUROLOGY

## 2018-11-16 RX ORDER — VENLAFAXINE HYDROCHLORIDE 75 MG/1
75 CAPSULE, EXTENDED RELEASE ORAL
Status: DISCONTINUED | OUTPATIENT
Start: 2018-11-16 | End: 2018-11-17

## 2018-11-16 RX ORDER — HYDROXYZINE HYDROCHLORIDE 10 MG/1
10 TABLET, FILM COATED ORAL 3 TIMES DAILY PRN
Status: DISCONTINUED | OUTPATIENT
Start: 2018-11-16 | End: 2018-11-20 | Stop reason: HOSPADM

## 2018-11-16 RX ORDER — NICOTINE 21 MG/24HR
1 PATCH, TRANSDERMAL 24 HOURS TRANSDERMAL DAILY
Status: DISCONTINUED | OUTPATIENT
Start: 2018-11-16 | End: 2018-11-20 | Stop reason: HOSPADM

## 2018-11-16 RX ORDER — DOXEPIN HYDROCHLORIDE 10 MG/1
10 CAPSULE ORAL NIGHTLY
Status: ON HOLD | COMMUNITY
End: 2018-11-20 | Stop reason: HOSPADM

## 2018-11-16 RX ADMIN — VENLAFAXINE HYDROCHLORIDE 75 MG: 75 CAPSULE, EXTENDED RELEASE ORAL at 14:27

## 2018-11-16 ASSESSMENT — PATIENT HEALTH QUESTIONNAIRE - PHQ9: SUM OF ALL RESPONSES TO PHQ QUESTIONS 1-9: 27

## 2018-11-16 NOTE — PLAN OF CARE
Problem: Altered Mood, Depressive Behavior:  Intervention: Assess coping skills and behavior                                                                      Group Therapy Note    Date: 11/16/2018  Start Time: 1000  End Time:  0883  Number of Participants: 14    Type of Group: Psychotherapy    Wellness Binder Information  Module Name:  Emotional wellness  Session Number:  4    Patient's Goal:  Self-esteem    Notes:  Pt was verbally prompted to attend group. Pt refused. Information about self-esteem was provided. Status After Intervention:      Participation Level:     Participation Quality:       Speech:         Thought Process/Content:       Affective Functioning:       Mood:       Level of consciousness:        Response to Learning:       Endings:     Modes of Intervention:       Discipline Responsible: Psychoeducational Specialist      Signature:  Lakeshia Browne

## 2018-11-16 NOTE — PLAN OF CARE
Problem: Altered Mood, Depressive Behavior:  Goal: Able to verbalize and/or display a decrease in depressive symptoms  Able to verbalize and/or display a decrease in depressive symptoms   Outcome: Ongoing  Group Therapy Note     Date: 11/16/2018  Start Time: 1100  End Time:  8179  Number of Participants: 15     Type of Group: Psychoeducation     Wellness Binder Information  Module Name:  Staying Well   Session Number:  1     Patient's Goal:  Daily maintenance and coping skills      Notes:  Pt did not participate in group though invited and encouraged      Status After Intervention:    Participation Level:      Participation Quality:      Speech:          Thought Process/Content:       Affective Functioning:       Mood:         Level of consciousness:         Response to Learning:         Endings: None Reported     Modes of Intervention: Education, Support and Socialization        Discipline Responsible: Psychoeducational Specialist        Signature:   Lizett Payne

## 2018-11-16 NOTE — ED PROVIDER NOTES
of this dictation. RE-ASSESSMENT          EMERGENCY DEPARTMENT COURSE and DIFFERENTIAL DIAGNOSIS/MDM:   Vitals:    Vitals:    11/15/18 1732 11/15/18 1945 11/15/18 2100 11/15/18 2130   BP: (!) 131/99 118/75 (!) 128/8 130/88   Pulse: 115 79 80 83   Resp: 22 20 18   Temp: 98.3 °F (36.8 °C)  98 °F (36.7 °C) 97.8 °F (36.6 °C)   SpO2: 96%  96% 95%   Weight: 175 lb (79.4 kg)   164 lb 3.2 oz (74.5 kg)   Height: 5' 11\" (1.803 m)              MDM  Number of Diagnoses or Management Options  Depression with suicidal ideation:   Diagnosis management comments: Patient presents to the ED with depression and suicidal ideation. Patient had a specific plan. He has been evaluated by psychiatry and they feel it is in the patient's best interest for an inpatient admission. PROCEDURES:    Procedures      FINAL IMPRESSION      1.  Depression with suicidal ideation          DISPOSITION/PLAN   DISPOSITION Decision To Admit 11/15/2018 08:12:48 PM      PATIENT REFERRED TO:  Toni Keyes[address]  [phone]            DISCHARGE MEDICATIONS:  Current Discharge Medication List          (Please note that portions of this note were completed with a voice recognition program.  Efforts were made to edit the dictations but occasionallywords are mis-transcribed.)    MICHAEL Rolle APRN  11/15/18 [id number]

## 2018-11-16 NOTE — PLAN OF CARE
Problem: Altered Mood, Depressive Behavior:  Intervention: Assess coping skills and behavior                                                                      Group Therapy Note    Date: 11/16/2018  Start Time: 1430  End Time:  8860  Number of Participants: 14    Type of Group: Cognitive Skills    Wellness Binder Information  Module Name:  Social wellness  Session Number:  1    Patient's Goal:  Your support network    Notes:  Pt was verbally prompted to attend group. Pt refused. Information about support network was provided. Status After Intervention:      Participation Level:     Participation Quality:       Speech:         Thought Process/Content:       Affective Functioning:       Mood:       Level of consciousness:        Response to Learning:       Endings:     Modes of Intervention:       Discipline Responsible: Psychoeducational Specialist      Signature:  Veronica Reyna

## 2018-11-16 NOTE — PLAN OF CARE
Problem: Altered Mood, Depressive Behavior:  Intervention: Assess coping skills and behavior                                                                      Group Therapy Note    Date: 11/16/2018  Start Time: 1000  End Time:  8519  Number of Participants: 14    Type of Group: Psychotherapy    Wellness Binder Information  Module Name:  Emotional wellness  Session Number:  4    Patient's Goal:  Self-esteem    Notes:  Pt acknowledged improving coping skills as one way to help improve self-esteem.     Status After Intervention:  Improved    Participation Level: Interactive    Participation Quality: Appropriate, Attentive and Sharing      Speech:  normal      Thought Process/Content: Logical      Affective Functioning: Congruent      Mood: congruent      Level of consciousness:  Alert, Oriented x4 and Attentive      Response to Learning: Able to verbalize current knowledge/experience      Endings: None Reported    Modes of Intervention: Education      Discipline Responsible: Psychoeducational Specialist      Signature:  Ileana Monroe

## 2018-11-17 PROCEDURE — 6370000000 HC RX 637 (ALT 250 FOR IP): Performed by: FAMILY MEDICINE

## 2018-11-17 PROCEDURE — 6370000000 HC RX 637 (ALT 250 FOR IP): Performed by: PSYCHIATRY & NEUROLOGY

## 2018-11-17 PROCEDURE — 99232 SBSQ HOSP IP/OBS MODERATE 35: CPT | Performed by: PSYCHIATRY & NEUROLOGY

## 2018-11-17 PROCEDURE — 1240000000 HC EMOTIONAL WELLNESS R&B

## 2018-11-17 RX ORDER — VENLAFAXINE HYDROCHLORIDE 75 MG/1
150 CAPSULE, EXTENDED RELEASE ORAL
Status: DISCONTINUED | OUTPATIENT
Start: 2018-11-18 | End: 2018-11-20 | Stop reason: HOSPADM

## 2018-11-17 RX ORDER — ERGOCALCIFEROL 1.25 MG/1
50000 CAPSULE ORAL WEEKLY
Status: DISCONTINUED | OUTPATIENT
Start: 2018-11-17 | End: 2018-11-20 | Stop reason: HOSPADM

## 2018-11-17 RX ORDER — GABAPENTIN 300 MG/1
300 CAPSULE ORAL 3 TIMES DAILY
Status: DISCONTINUED | OUTPATIENT
Start: 2018-11-17 | End: 2018-11-20 | Stop reason: HOSPADM

## 2018-11-17 RX ADMIN — HYDROXYZINE HYDROCHLORIDE 10 MG: 10 TABLET, FILM COATED ORAL at 13:23

## 2018-11-17 RX ADMIN — HYDROXYZINE HYDROCHLORIDE 10 MG: 10 TABLET, FILM COATED ORAL at 05:51

## 2018-11-17 RX ADMIN — GABAPENTIN 300 MG: 300 CAPSULE ORAL at 20:56

## 2018-11-17 RX ADMIN — GABAPENTIN 300 MG: 300 CAPSULE ORAL at 17:28

## 2018-11-17 RX ADMIN — HYDROXYZINE HYDROCHLORIDE 10 MG: 10 TABLET, FILM COATED ORAL at 20:56

## 2018-11-17 RX ADMIN — VENLAFAXINE HYDROCHLORIDE 75 MG: 75 CAPSULE, EXTENDED RELEASE ORAL at 09:52

## 2018-11-17 RX ADMIN — ERGOCALCIFEROL 50000 UNITS: 1.25 CAPSULE ORAL at 09:50

## 2018-11-17 NOTE — PLAN OF CARE
Problem: Falls - Risk of:  Goal: Will remain free from falls  Will remain free from falls    Outcome: Ongoing    Goal: Absence of physical injury  Absence of physical injury    Outcome: Ongoing      Problem: Altered Mood, Depressive Behavior:  Goal: Able to verbalize acceptance of life and situations over which he or she has no control  Able to verbalize acceptance of life and situations over which he or she has no control   Outcome: Ongoing    Goal: Able to verbalize and/or display a decrease in depressive symptoms  Able to verbalize and/or display a decrease in depressive symptoms   Outcome: Ongoing    Goal: Ability to disclose and discuss suicidal ideas will improve  Ability to disclose and discuss suicidal ideas will improve   Outcome: Ongoing    Goal: Able to verbalize support systems  Able to verbalize support systems   Outcome: Ongoing    Goal: Absence of self-harm  Absence of self-harm   Outcome: Ongoing    Goal: Patient specific goal  Patient specific goal   Outcome: Ongoing    Goal: Participates in care planning  Participates in care planning   Outcome: Ongoing      Problem: Depressive Behavior With or Without Suicide Precautions:  Goal: Able to verbalize acceptance of life and situations over which he or she has no control  Able to verbalize acceptance of life and situations over which he or she has no control   Outcome: Ongoing    Goal: Able to verbalize and/or display a decrease in depressive symptoms  Able to verbalize and/or display a decrease in depressive symptoms   Outcome: Ongoing    Goal: Ability to disclose and discuss suicidal ideas will improve  Ability to disclose and discuss suicidal ideas will improve   Outcome: Ongoing    Goal: Able to verbalize support systems  Able to verbalize support systems   Outcome: Ongoing    Goal: Absence of self-harm  Absence of self-harm   Outcome: Ongoing    Goal: Patient specific goal  Patient specific goal   Outcome: Ongoing    Goal: Participates in care

## 2018-11-18 LAB
CHOLESTEROL, TOTAL: 235 MG/DL (ref 160–199)
HBA1C MFR BLD: 5.7 % (ref 4–6)
HDLC SERPL-MCNC: 52 MG/DL (ref 55–121)
LDL CHOLESTEROL CALCULATED: 166 MG/DL
TRIGL SERPL-MCNC: 84 MG/DL (ref 0–149)

## 2018-11-18 PROCEDURE — 6370000000 HC RX 637 (ALT 250 FOR IP): Performed by: PSYCHIATRY & NEUROLOGY

## 2018-11-18 PROCEDURE — 36415 COLL VENOUS BLD VENIPUNCTURE: CPT

## 2018-11-18 PROCEDURE — 83036 HEMOGLOBIN GLYCOSYLATED A1C: CPT

## 2018-11-18 PROCEDURE — 1240000000 HC EMOTIONAL WELLNESS R&B

## 2018-11-18 PROCEDURE — 80061 LIPID PANEL: CPT

## 2018-11-18 RX ADMIN — HYDROXYZINE HYDROCHLORIDE 10 MG: 10 TABLET, FILM COATED ORAL at 20:29

## 2018-11-18 RX ADMIN — GABAPENTIN 300 MG: 300 CAPSULE ORAL at 09:13

## 2018-11-18 RX ADMIN — VENLAFAXINE HYDROCHLORIDE 150 MG: 75 CAPSULE, EXTENDED RELEASE ORAL at 09:15

## 2018-11-18 RX ADMIN — GABAPENTIN 300 MG: 300 CAPSULE ORAL at 13:17

## 2018-11-18 RX ADMIN — GABAPENTIN 300 MG: 300 CAPSULE ORAL at 20:29

## 2018-11-18 RX ADMIN — HYDROXYZINE HYDROCHLORIDE 10 MG: 10 TABLET, FILM COATED ORAL at 12:10

## 2018-11-19 VITALS
HEIGHT: 71 IN | TEMPERATURE: 97.6 F | RESPIRATION RATE: 16 BRPM | WEIGHT: 164.2 LBS | DIASTOLIC BLOOD PRESSURE: 76 MMHG | HEART RATE: 86 BPM | SYSTOLIC BLOOD PRESSURE: 133 MMHG | OXYGEN SATURATION: 96 % | BODY MASS INDEX: 22.99 KG/M2

## 2018-11-19 PROCEDURE — 1240000000 HC EMOTIONAL WELLNESS R&B

## 2018-11-19 PROCEDURE — 99231 SBSQ HOSP IP/OBS SF/LOW 25: CPT | Performed by: NURSE PRACTITIONER

## 2018-11-19 PROCEDURE — 6370000000 HC RX 637 (ALT 250 FOR IP): Performed by: PSYCHIATRY & NEUROLOGY

## 2018-11-19 RX ADMIN — HYDROXYZINE HYDROCHLORIDE 10 MG: 10 TABLET, FILM COATED ORAL at 14:36

## 2018-11-19 RX ADMIN — HYDROXYZINE HYDROCHLORIDE 10 MG: 10 TABLET, FILM COATED ORAL at 22:34

## 2018-11-19 RX ADMIN — VENLAFAXINE HYDROCHLORIDE 150 MG: 75 CAPSULE, EXTENDED RELEASE ORAL at 08:22

## 2018-11-19 RX ADMIN — GABAPENTIN 300 MG: 300 CAPSULE ORAL at 08:22

## 2018-11-19 RX ADMIN — GABAPENTIN 300 MG: 300 CAPSULE ORAL at 21:10

## 2018-11-19 RX ADMIN — GABAPENTIN 300 MG: 300 CAPSULE ORAL at 14:36

## 2018-11-19 NOTE — PLAN OF CARE
Problem: Depressive Behavior With or Without Suicide Precautions:  Goal: Able to verbalize acceptance of life and situations over which he or she has no control  Able to verbalize acceptance of life and situations over which he or she has no control   Outcome: Ongoing                                                                      Group Therapy Note    Date: 11/19/2018  Start Time: 1430  End Time:  1530  Number of Participants: 14    Type of Group: Cognitive Skills    Wellness Binder Information  Module Name:  [address] [place] [address]  Session Number:  1    Patient's Goal:  To gain knowledge of practical facts about mental illness    Notes:  Pt demonstrated improved knowledge of practical facts about mental illness by actively participating in group activity.     Status After Intervention:  Unchanged    Participation Level: Interactive    Participation Quality: Attentive      Speech:  normal      Thought Process/Content: Logical      Affective Functioning: Congruent      Mood: anxious and depressed      Level of consciousness:  Alert and Oriented x4      Response to Learning: Able to verbalize current knowledge/experience, Able to verbalize/acknowledge new learning and Progressing to goal      Endings: None Reported    Modes of Intervention: Education      Discipline Responsible: Psychoeducational Specialist      Signature:  Priscila Gramajo

## 2018-11-20 PROCEDURE — 6370000000 HC RX 637 (ALT 250 FOR IP): Performed by: PSYCHIATRY & NEUROLOGY

## 2018-11-20 PROCEDURE — 99238 HOSP IP/OBS DSCHRG MGMT 30/<: CPT | Performed by: NURSE PRACTITIONER

## 2018-11-20 PROCEDURE — 5130000000 HC BRIDGE APPOINTMENT

## 2018-11-20 RX ORDER — VENLAFAXINE HYDROCHLORIDE 150 MG/1
150 CAPSULE, EXTENDED RELEASE ORAL
Qty: 14 CAPSULE | Refills: 0 | Status: ON HOLD | OUTPATIENT
Start: 2018-11-21 | End: 2021-09-09 | Stop reason: HOSPADM

## 2018-11-20 RX ORDER — NICOTINE 21 MG/24HR
1 PATCH, TRANSDERMAL 24 HOURS TRANSDERMAL DAILY
Qty: 14 PATCH | Refills: 0 | Status: ON HOLD | OUTPATIENT
Start: 2018-11-21 | End: 2021-09-09 | Stop reason: HOSPADM

## 2018-11-20 RX ORDER — GABAPENTIN 300 MG/1
300 CAPSULE ORAL 3 TIMES DAILY
Qty: 42 CAPSULE | Refills: 0 | Status: ON HOLD | OUTPATIENT
Start: 2018-11-20 | End: 2018-12-04

## 2018-11-20 RX ORDER — HYDROXYZINE HYDROCHLORIDE 10 MG/1
10 TABLET, FILM COATED ORAL 3 TIMES DAILY PRN
Qty: 30 TABLET | Refills: 0 | Status: SHIPPED | OUTPATIENT
Start: 2018-11-20 | End: 2018-11-30

## 2018-11-20 RX ADMIN — GABAPENTIN 300 MG: 300 CAPSULE ORAL at 08:06

## 2018-11-20 RX ADMIN — HYDROXYZINE HYDROCHLORIDE 10 MG: 10 TABLET, FILM COATED ORAL at 09:15

## 2018-11-20 RX ADMIN — VENLAFAXINE HYDROCHLORIDE 150 MG: 75 CAPSULE, EXTENDED RELEASE ORAL at 08:06

## 2018-11-20 NOTE — DISCHARGE SUMMARY
Discharge Summary     Patient ID:  Sophy Ortega  [id number]  50 y.o.  1980    Admit date: 11/15/2018  Discharge date: 11/20/2018    Admitting Physician: King Rolon MD   Attending Physician: No att. providers found  Discharge Provider: Alvarez Rolon     Discharge Diagnoses:   [facility] [F[facility].9]    Admission Condition: fair    Discharged Condition: good    Indication for Admission: suicidal ideation    HPI:  45year old WM admitted to psychiatry for safety and stabilization after patient presented to the ER with depression, anxiety and suicidal ideations and plan to place plastic bag over head.      He tells me today that he \"ran out of medications\" he says \"I felt worse right away when I left here. There's so much stress on me out there, things are just snowballing. Being here is like a little vacation for me\". Patient is crying throughout the interview. He shows no eye contact. He says with his \"depession and anxiety I just can't face people\".     Stressors include. \"Getting father and father behind\" in rent of trailer. States he has water and electric \"for now\". And has only heat from electric heaters and he is out of propane. 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 And hasn't been to work in months because of his depression and anxiety. States \"they tell me I still have a job there, but I just can't\". Complains of pain from Degenerative disk disease. Additional stressors include problems with his girlfriend. States they had lived together for 6 years until she went to cd treatment and is now living with her dad. Pt says he has little contact with her and doesn't know if they are still together.      Reports he has been sober from alcohol \"last couple of months. \"         Patient reports excessive sleep. \"as long as I'm sleeping nothing bothers me\". Reports poor appetite, lost 20lbs X 2 months. Low energy. Reports current feelings of hopelessness.   Reports current

## 2018-11-20 NOTE — PROGRESS NOTES
Admission Note      Reason for admission/Target Symptom: Patient admitted to [facility] due to PT states reason for ED visit, \"for the last 6 months, I've been going deeper and deeper in depression and anxiety. My girlfriend went to rehab and she's been gone and hasn't come home   Diagnoses: Depression NOS  UDS: neg  BAL:  neg    SW met with treatment team to discuss patient's treatment including care planning, discharge planning, and follow-up needs. Pt has been admitted to [facility]. Treatment team has identified patient's discharge needs as medication management and outpatient therapy/counseling. Pt confirmed  the need for ongoing treatment post inpatient stay. Pt was also provided a handout of contact information for drug and alcohol treatment centers and other community support service such as [facility], AA, and [facility] .
BHI Daily Shift Assessment  Nursing Progress Note    Room: [facility]605-01 Name: Benjamin Cronin Age: 45 y.o. Ethnicity:  Gender: male   Dx: <principal problem not specified>  Precautions: suicide risk  CPAP: No Accu-Chek: No  MSE:  Status and Exam  Normal: No  Facial Expression: Flat  Affect: Appropriate  Level of Consciousness: Alert  Mood:Normal: No  Mood: Depressed, Anxious  Motor Activity:Normal: No  Motor Activity: Decreased  Interview Behavior: Cooperative  Preception: [place] to Person, [place] to Time, [place] to Place, [place] to Situation  Attention:Normal: No  Attention: Distractible  Thought Processes: Blocking  Thought Content:Normal: No  Thought Content: Preoccupations  Hallucinations: None  Delusions: No  Memory:Normal: No  Memory: Poor Recent, Poor Remote  Insight and Judgment: No  Insight and Judgment: Poor Judgment, Poor Insight  Present Suicidal Ideation: No  Present Homicidal Ideation: No  Sleep: Yes, Good, no sleep issues Sched Sleep Meds: No PRN Sleep Meds: No Other PRN Meds: Yes Med Compliant: Yes Appetite: good Percent Meals: 75% Social: Yes ADLs: No Speech: normal Depression: 5 Anxiety: 3  Denies SI/HI/AVH. Attended group. Social with peers & staff.     Manjula Cerda RN
Bridge Appointment completed: Reviewed Discharge Instructions with patient. Patient verbalizes understanding and agreement with the discharge plan using the teachback method. Referral for Outpatient Tobacco Cessation Counseling, upon discharge (juana X if applicable and completed):    ( )  Hospital staff assisted patient to call Quit Line or faxed referral                                   during hospitalization                  ( )  Recognizing danger situations (included triggers and roadblocks), if not completed on admission                    ( )  Coping skills (new ways to manage stress, exercise, relaxation techniques, changing routine, distraction), if not completed on admission                                                           ( )  Basic information about quitting (benefits of quitting, techniques in how to quit, available resources, if not completed on admission  ( ) Referral for counseling faxed to [facility]   (x ) Patient refused referral  ( ) Patient refused counseling  ( ) Patient refused smoking cessation medication upon discharge    Vaccinations (juana X if applicable and completed):   ( x) Patient states already received influenza vaccine elsewhere  (x ) Patient received influenza vaccine during this hospitalization  (x ) Patient refused influenza vaccine at this time
Completed Medication verification with patient's pharmacy.
Group Therapy Note    Date: 11/18/2018  Start Time: 2015  End Time:  2045  Number of Participants: 19    Type of Group: Wrap-Up    Pt was compliant with wrap up.     Discipline Responsible: Behavorial Health Tech II      Signature: Mishel Garcia
Group Therapy Note    Start Time: 0900  End Time:  0930  Number of Participants: 14    Type of Group: Community Meeting       Patient's Goal:        Notes:  Pt didn't set goals on this day.     Participation Level: Minimal       Participation Quality: Resistant      Thought Process/Content: Logical      Affective Functioning: Flat      Mood: depressed      Level of consciousness:  Inattentive      Modes of Intervention: Support      Discipline Responsible: Behavioral Health Tech II      Signature:  Lucia Lucero
Pt is alert and oriented. Pt is calm and cooperative. Pt rates dperession a 4 and anxiety a 4. Pt denies si, hi, or avh. Med complaint.
Requirement Note      SW met with pt to complete Psychosocial within 72 hours, CSSRS within 24 hours, and treatment plan signature sheet within 72 hours. SW explained treatment goals with pt. Decreasing depression and anxiety by improvement of positive coping patterns was discussed. Pt acknowledged understanding of treatment goals and signed treatment plan signature sheet.           In the last 6 months has the pt been a danger to self:YES   In the last 6 months has the pt been a danger to others:NO     Provided pt with [employer] handout entitled \"Quitting Smoking. \"  Reviewed handout with pt addressing dangers of smoking, developing coping skills, and providing basic information about quitting.        Patient  refused/declined practical counseling of tobacco practical counseling during the hospital stay.                 
disorder ([facility]    Suicidal ideation    Suicidal ideations    Major depression, recurrent ([facility]    Tobacco dependence    Depression with suicidal ideation             Plan:    Encourage group therapy  15 minute safety checks  Medical monitoring by Dr. Reilly Moa and associates  Continue current therapy and medications  Possible dc in the am     Amount of time spent with patient:  15 minutes with greater than 50% of the time spent in counseling and collaboration of care.     MICHAEL Bahena  Clinician Signature: signed electronically

## 2019-02-05 ENCOUNTER — APPOINTMENT (OUTPATIENT)
Dept: GENERAL RADIOLOGY | Age: 39
End: 2019-02-05
Payer: COMMERCIAL

## 2019-02-05 ENCOUNTER — HOSPITAL ENCOUNTER (EMERGENCY)
Age: 39
Discharge: HOME | End: 2019-02-05
Payer: COMMERCIAL

## 2019-02-05 VITALS
TEMPERATURE: 98.5 F | RESPIRATION RATE: 18 BRPM | DIASTOLIC BLOOD PRESSURE: 85 MMHG | HEART RATE: 77 BPM | OXYGEN SATURATION: 95 % | SYSTOLIC BLOOD PRESSURE: 128 MMHG

## 2019-02-05 DIAGNOSIS — M51.37 DDD (DEGENERATIVE DISC DISEASE), LUMBOSACRAL: ICD-10-CM

## 2019-02-05 DIAGNOSIS — S39.012A STRAIN OF LUMBAR REGION, INITIAL ENCOUNTER: Primary | ICD-10-CM

## 2019-02-05 PROCEDURE — 6360000002 HC RX W HCPCS: Performed by: PHYSICIAN ASSISTANT

## 2019-02-05 PROCEDURE — 72100 X-RAY EXAM L-S SPINE 2/3 VWS: CPT

## 2019-02-05 PROCEDURE — 99283 EMERGENCY DEPT VISIT LOW MDM: CPT | Performed by: PHYSICIAN ASSISTANT

## 2019-02-05 PROCEDURE — 96372 THER/PROPH/DIAG INJ SC/IM: CPT

## 2019-02-05 PROCEDURE — 6370000000 HC RX 637 (ALT 250 FOR IP): Performed by: PHYSICIAN ASSISTANT

## 2019-02-05 PROCEDURE — 99283 EMERGENCY DEPT VISIT LOW MDM: CPT

## 2019-02-05 RX ORDER — LIDOCAINE 4 G/G
1 PATCH TOPICAL DAILY
Status: DISCONTINUED | OUTPATIENT
Start: 2019-02-05 | End: 2019-02-05 | Stop reason: HOSPADM

## 2019-02-05 RX ORDER — DEXAMETHASONE SODIUM PHOSPHATE 4 MG/ML
4 INJECTION, SOLUTION INTRA-ARTICULAR; INTRALESIONAL; INTRAMUSCULAR; INTRAVENOUS; SOFT TISSUE ONCE
Status: COMPLETED | OUTPATIENT
Start: 2019-02-05 | End: 2019-02-05

## 2019-02-05 RX ORDER — MELOXICAM 15 MG/1
15 TABLET ORAL DAILY
Qty: 30 TABLET | Refills: 0 | Status: ON HOLD | OUTPATIENT
Start: 2019-02-05 | End: 2021-09-09 | Stop reason: HOSPADM

## 2019-02-05 RX ORDER — KETOROLAC TROMETHAMINE 30 MG/ML
30 INJECTION, SOLUTION INTRAMUSCULAR; INTRAVENOUS ONCE
Status: COMPLETED | OUTPATIENT
Start: 2019-02-05 | End: 2019-02-05

## 2019-02-05 RX ORDER — METHYLPREDNISOLONE 4 MG/1
TABLET ORAL
Qty: 1 KIT | Refills: 0 | Status: SHIPPED | OUTPATIENT
Start: 2019-02-05 | End: 2019-02-11

## 2019-02-05 RX ADMIN — KETOROLAC TROMETHAMINE 30 MG: 30 INJECTION, SOLUTION INTRAMUSCULAR at 17:21

## 2019-02-05 RX ADMIN — DEXAMETHASONE SODIUM PHOSPHATE 4 MG: 4 INJECTION, SOLUTION INTRAMUSCULAR; INTRAVENOUS at 17:21

## 2019-02-05 ASSESSMENT — PAIN SCALES - GENERAL
PAINLEVEL_OUTOF10: 7
PAINLEVEL_OUTOF10: 7

## 2019-02-05 ASSESSMENT — PAIN DESCRIPTION - LOCATION: LOCATION: BACK

## 2019-02-06 ASSESSMENT — ENCOUNTER SYMPTOMS
CONSTIPATION: 0
SHORTNESS OF BREATH: 0
COUGH: 0
NAUSEA: 0
COLOR CHANGE: 0
DIARRHEA: 0
APNEA: 0
BACK PAIN: 1
ABDOMINAL PAIN: 0
VOMITING: 0

## 2021-09-07 ENCOUNTER — HOSPITAL ENCOUNTER (INPATIENT)
Age: 41
LOS: 3 days | Discharge: HOME | DRG: 885 | End: 2021-09-10
Attending: EMERGENCY MEDICINE | Admitting: PSYCHIATRY & NEUROLOGY
Payer: MEDICAID

## 2021-09-07 DIAGNOSIS — R45.850 HOMICIDAL IDEATIONS: ICD-10-CM

## 2021-09-07 DIAGNOSIS — F33.0 MILD EPISODE OF RECURRENT MAJOR DEPRESSIVE DISORDER (HCC): ICD-10-CM

## 2021-09-07 DIAGNOSIS — R45.851 SUICIDAL IDEATIONS: Primary | ICD-10-CM

## 2021-09-07 PROBLEM — F32.A DEPRESSION WITH SUICIDAL IDEATION: Status: ACTIVE | Noted: 2021-09-07

## 2021-09-07 LAB
ACETAMINOPHEN LEVEL: <15 UG/ML
ALBUMIN SERPL-MCNC: 4.7 G/DL (ref 3.5–5.2)
ALP BLD-CCNC: 79 U/L (ref 40–130)
ALT SERPL-CCNC: 38 U/L (ref 5–41)
AMPHETAMINE SCREEN, URINE: NEGATIVE
ANION GAP SERPL CALCULATED.3IONS-SCNC: 13 MMOL/L (ref 7–19)
AST SERPL-CCNC: 17 U/L (ref 5–40)
BARBITURATE SCREEN URINE: NEGATIVE
BASOPHILS ABSOLUTE: 0 K/UL (ref 0–0.2)
BASOPHILS RELATIVE PERCENT: 0.4 % (ref 0–1)
BENZODIAZEPINE SCREEN, URINE: NEGATIVE
BILIRUB SERPL-MCNC: 0.3 MG/DL (ref 0.2–1.2)
BILIRUBIN URINE: NEGATIVE
BLOOD, URINE: NEGATIVE
BUN BLDV-MCNC: 4 MG/DL (ref 6–20)
CALCIUM SERPL-MCNC: 9.8 MG/DL (ref 8.6–10)
CANNABINOID SCREEN URINE: NEGATIVE
CHLORIDE BLD-SCNC: 107 MMOL/L (ref 98–111)
CLARITY: CLEAR
CO2: 24 MMOL/L (ref 22–29)
COCAINE METABOLITE SCREEN URINE: NEGATIVE
COLOR: YELLOW
CREAT SERPL-MCNC: 0.9 MG/DL (ref 0.5–1.2)
EOSINOPHILS ABSOLUTE: 0.1 K/UL (ref 0–0.6)
EOSINOPHILS RELATIVE PERCENT: 1.2 % (ref 0–5)
ETHANOL: 59 MG/DL (ref 0–0.08)
GFR AFRICAN AMERICAN: >59
GFR NON-AFRICAN AMERICAN: >60
GLUCOSE BLD-MCNC: 99 MG/DL (ref 74–109)
GLUCOSE URINE: NEGATIVE MG/DL
HCT VFR BLD CALC: 49.9 % (ref 42–52)
HEMOGLOBIN: 16 G/DL (ref 14–18)
IMMATURE GRANULOCYTES #: 0 K/UL
KETONES, URINE: NEGATIVE MG/DL
LEUKOCYTE ESTERASE, URINE: NEGATIVE
LYMPHOCYTES ABSOLUTE: 2.1 K/UL (ref 1.1–4.5)
LYMPHOCYTES RELATIVE PERCENT: 30.9 % (ref 20–40)
Lab: NORMAL
MCH RBC QN AUTO: 30.1 PG (ref 27–31)
MCHC RBC AUTO-ENTMCNC: 32.1 G/DL (ref 33–37)
MCV RBC AUTO: 94 FL (ref 80–94)
MONOCYTES ABSOLUTE: 0.8 K/UL (ref 0–0.9)
MONOCYTES RELATIVE PERCENT: 11.5 % (ref 0–10)
NEUTROPHILS ABSOLUTE: 3.8 K/UL (ref 1.5–7.5)
NEUTROPHILS RELATIVE PERCENT: 55.7 % (ref 50–65)
NITRITE, URINE: NEGATIVE
OPIATE SCREEN URINE: NEGATIVE
PDW BLD-RTO: 12.6 % (ref 11.5–14.5)
PH UA: 5.5 (ref 5–8)
PLATELET # BLD: 310 K/UL (ref 130–400)
PMV BLD AUTO: 10.6 FL (ref 9.4–12.4)
POTASSIUM REFLEX MAGNESIUM: 3.7 MMOL/L (ref 3.5–5)
PROTEIN UA: NEGATIVE MG/DL
RBC # BLD: 5.31 M/UL (ref 4.7–6.1)
SALICYLATE, SERUM: <3 MG/DL (ref 3–10)
SARS-COV-2, NAAT: NOT DETECTED
SODIUM BLD-SCNC: 144 MMOL/L (ref 136–145)
SPECIFIC GRAVITY UA: 1.01 (ref 1–1.03)
TOTAL PROTEIN: 7.4 G/DL (ref 6.6–8.7)
UROBILINOGEN, URINE: 1 E.U./DL
WBC # BLD: 6.9 K/UL (ref 4.8–10.8)

## 2021-09-07 PROCEDURE — 81003 URINALYSIS AUTO W/O SCOPE: CPT

## 2021-09-07 PROCEDURE — 99283 EMERGENCY DEPT VISIT LOW MDM: CPT

## 2021-09-07 PROCEDURE — 85025 COMPLETE CBC W/AUTO DIFF WBC: CPT

## 2021-09-07 PROCEDURE — 80143 DRUG ASSAY ACETAMINOPHEN: CPT

## 2021-09-07 PROCEDURE — 80307 DRUG TEST PRSMV CHEM ANLYZR: CPT

## 2021-09-07 PROCEDURE — 80053 COMPREHEN METABOLIC PANEL: CPT

## 2021-09-07 PROCEDURE — 87635 SARS-COV-2 COVID-19 AMP PRB: CPT

## 2021-09-07 PROCEDURE — 1240000000 HC EMOTIONAL WELLNESS R&B

## 2021-09-07 PROCEDURE — 82077 ASSAY SPEC XCP UR&BREATH IA: CPT

## 2021-09-07 PROCEDURE — 80179 DRUG ASSAY SALICYLATE: CPT

## 2021-09-07 PROCEDURE — 36415 COLL VENOUS BLD VENIPUNCTURE: CPT

## 2021-09-07 RX ORDER — ACETAMINOPHEN 325 MG/1
650 TABLET ORAL EVERY 4 HOURS PRN
Status: DISCONTINUED | OUTPATIENT
Start: 2021-09-07 | End: 2021-09-10 | Stop reason: HOSPADM

## 2021-09-07 RX ORDER — POLYETHYLENE GLYCOL 3350 17 G/17G
17 POWDER, FOR SOLUTION ORAL DAILY PRN
Status: DISCONTINUED | OUTPATIENT
Start: 2021-09-07 | End: 2021-09-10 | Stop reason: HOSPADM

## 2021-09-07 RX ORDER — TRAZODONE HYDROCHLORIDE 50 MG/1
50 TABLET ORAL NIGHTLY PRN
Status: DISCONTINUED | OUTPATIENT
Start: 2021-09-07 | End: 2021-09-10 | Stop reason: HOSPADM

## 2021-09-07 RX ORDER — NICOTINE 21 MG/24HR
1 PATCH, TRANSDERMAL 24 HOURS TRANSDERMAL DAILY
Status: DISCONTINUED | OUTPATIENT
Start: 2021-09-08 | End: 2021-09-10 | Stop reason: HOSPADM

## 2021-09-07 ASSESSMENT — ENCOUNTER SYMPTOMS
COUGH: 0
PHOTOPHOBIA: 0
BACK PAIN: 0
SHORTNESS OF BREATH: 0
EYE ITCHING: 0
COLOR CHANGE: 0
EYE DISCHARGE: 0
APNEA: 0

## 2021-09-08 ENCOUNTER — APPOINTMENT (OUTPATIENT)
Dept: GENERAL RADIOLOGY | Age: 41
DRG: 885 | End: 2021-09-08
Payer: MEDICAID

## 2021-09-08 PROBLEM — F33.2 SEVERE EPISODE OF RECURRENT MAJOR DEPRESSIVE DISORDER, WITHOUT PSYCHOTIC FEATURES (HCC): Status: ACTIVE | Noted: 2021-09-08

## 2021-09-08 PROBLEM — F32.A DEPRESSION WITH SUICIDAL IDEATION: Status: RESOLVED | Noted: 2021-09-07 | Resolved: 2021-09-08

## 2021-09-08 PROBLEM — R45.851 DEPRESSION WITH SUICIDAL IDEATION: Status: RESOLVED | Noted: 2021-09-07 | Resolved: 2021-09-08

## 2021-09-08 PROCEDURE — 6370000000 HC RX 637 (ALT 250 FOR IP): Performed by: NURSE PRACTITIONER

## 2021-09-08 PROCEDURE — 72100 X-RAY EXAM L-S SPINE 2/3 VWS: CPT

## 2021-09-08 PROCEDURE — 90792 PSYCH DIAG EVAL W/MED SRVCS: CPT | Performed by: NURSE PRACTITIONER

## 2021-09-08 PROCEDURE — 6370000000 HC RX 637 (ALT 250 FOR IP): Performed by: PSYCHIATRY & NEUROLOGY

## 2021-09-08 PROCEDURE — 1240000000 HC EMOTIONAL WELLNESS R&B

## 2021-09-08 RX ORDER — DULOXETIN HYDROCHLORIDE 20 MG/1
20 CAPSULE, DELAYED RELEASE ORAL DAILY
Status: DISCONTINUED | OUTPATIENT
Start: 2021-09-08 | End: 2021-09-10 | Stop reason: HOSPADM

## 2021-09-08 RX ORDER — GABAPENTIN 300 MG/1
300 CAPSULE ORAL 3 TIMES DAILY
Status: DISCONTINUED | OUTPATIENT
Start: 2021-09-08 | End: 2021-09-10 | Stop reason: HOSPADM

## 2021-09-08 RX ADMIN — GABAPENTIN 300 MG: 300 CAPSULE ORAL at 15:32

## 2021-09-08 RX ADMIN — DULOXETINE HYDROCHLORIDE 20 MG: 20 CAPSULE, DELAYED RELEASE ORAL at 15:32

## 2021-09-08 RX ADMIN — TRAZODONE HYDROCHLORIDE 50 MG: 50 TABLET ORAL at 21:03

## 2021-09-08 RX ADMIN — GABAPENTIN 300 MG: 300 CAPSULE ORAL at 21:03

## 2021-09-08 RX ADMIN — TRAZODONE HYDROCHLORIDE 50 MG: 50 TABLET ORAL at 00:37

## 2021-09-08 ASSESSMENT — SLEEP AND FATIGUE QUESTIONNAIRES
DO YOU USE A SLEEP AID: NO
AVERAGE NUMBER OF SLEEP HOURS: 8
SLEEP PATTERN: DIFFICULTY FALLING ASLEEP;DISTURBED/INTERRUPTED SLEEP;NIGHTMARES/TERRORS;RESTLESSNESS
RESTFUL SLEEP: NO
DO YOU HAVE DIFFICULTY SLEEPING: YES
DIFFICULTY FALLING ASLEEP: YES
DIFFICULTY STAYING ASLEEP: YES
DIFFICULTY ARISING: NO

## 2021-09-08 ASSESSMENT — PATIENT HEALTH QUESTIONNAIRE - PHQ9: SUM OF ALL RESPONSES TO PHQ QUESTIONS 1-9: 24

## 2021-09-08 ASSESSMENT — LIFESTYLE VARIABLES: HISTORY_ALCOHOL_USE: YES

## 2021-09-08 NOTE — PLAN OF CARE
Problem: Anger Management/Homicidal Ideation:  Goal: Ability to verbalize frustrations and anger appropriately will improve  9/8/2021 1617 by Silvestre Villareal  Outcome: Ongoing     Group Therapy Note     Date: 9/8/2021  Start Time: 1583  End Time:  1600  Number of Participants: 7     Type of Group: Recovery     Wellness Binder Information  Module Name: staying well  Session Number:  1     Patient's Goal:  daily maintenance and coping skills     Notes:  pt acknowledged use of positive coping skills daily to help stay well.      Status After Intervention:  Improved     Participation Level: Interactive     Participation Quality: Appropriate, Attentive, and Sharing        Speech:  normal        Thought Process/Content: Logical        Affective Functioning: Congruent        Mood: congruent        Level of consciousness:  Alert, Oriented x4, and Attentive        Response to Learning: Able to verbalize current knowledge/experience        Endings: None Reported     Modes of Intervention: Education        Discipline Responsible: Psychoeducational Specialist        Signature:  Silvestre Villareal

## 2021-09-08 NOTE — PROGRESS NOTES
BHI Admission from ED  Nursing Admission Note        Reason for Admission: Laurie Smith is a 39 y.o. male who presents to the emergency department with complaints of hurting himself and others intermittently he tells me he would never act on \"I had too much to lose. \" he tells me hasnt been working over the past year. He has back problems prior fx and this has been a trigger for spiraling. He now states he has nothing to lose and it is scaring him about harming himself and others. He denies voices or hallucinations. Denies drugs usage admits to drinking a couple of beers to get his nerves calm. He denies medical problems otherwise. These are people that have wronged him. He has poor social support not currently seeing PCP or psychiatrist states he is not currently on meds \"they havent helped. \"    Patient Active Problem List   Diagnosis    Substance induced mood disorder ([facility]    Suicidal ideation    Suicidal ideations    Major depression, recurrent ([facility]    Tobacco dependence    Depression with suicidal ideation         Addictive Behavior:   Addictive Behavior  In the past 3 months, have you felt or has someone told you that you have a problem with:  : None  Do you have a history of Chemical Use?: Yes (before 25)  Do you have a history of Alcohol Use?: Yes (quit a year ago)  Do you have a history of Street Drug Abuse?: Yes (before 25)  Histroy of Prescripton Drug Abuse?: Yes (before 25)    Medical Problems:   Past Medical History:   Diagnosis Date    Anxiety     Depression     Panic attack     [place] spotted fever 2005       Status EXAM:  Status and Exam  Normal: No  Facial Expression: Flat  Affect: Congruent  Level of Consciousness: Alert  Mood:Normal: No  Mood: Depressed, Anxious, Sad  Motor Activity:Normal: No  Motor Activity: Decreased  Interview Behavior: Cooperative  Preception: [place] to Person, [place] to Time, [place] to Place, [place] to Situation  Attention:Normal: No  Attention: Distractible  Thought Processes: Circumstantial  Thought Content:Normal: Yes  Thought Content: Preoccupations  Hallucinations: None  Delusions: No  Memory:Normal: No  Memory: Poor Recent, Poor Remote  Insight and Judgment: No  Insight and Judgment: Poor Judgment, Poor Insight  Present Suicidal Ideation: Yes  Present Homicidal Ideation: Yes (pecific plan to hurt certain people that have done him wrong like his ex and his mother)      Metabolic Screening:    Lab Results   Component Value Date    LABA1C 5.7 11/18/2018     Lab Results   Component Value Date    CHOL 235 (H) 11/18/2018     Lab Results   Component Value Date    TRIG 84 11/18/2018     Lab Results   Component Value Date    HDL 52 (L) 11/18/2018     No components found for: LDLCAL  No results found for: LABVLDL    Body mass index is 27.32 kg/m².   BP Readings from Last 2 Encounters:   09/08/21 122/85   02/05/19 128/85       PATIENT STRENGTHS:  Strengths: Communication    Patient Strengths and Limitations:  Limitations: Tendency to isolate self, Hopeless about future, Apathetic / unmotivated, Lacks leisure interests, General negative or hopeless attitude about future/recovery      Tobacco Screening:  Practical Counseling, on admission, juana X, if applicable and completed (first 3 are required if patient doesn't refuse):            ( )  Recognizing danger situations (included triggers and roadblocks)                    ( )  Coping skills (new ways to manage stress, exercise, relaxation techniques, changing routine, distraction)                                                           ( )  Basic information about quitting (benefits of quitting, techniques in how to quit, available resources  ( ) Referral for counseling faxed to [facility]                                           (X ) Patient refused counseling  ( ) Patient has not smoked in the last 30 days        Admission to Unit:    Pt admitted to [facility] under the care of Dr. Rachel Mueller,  arrived on unit via [facility] with security and staff from ER    Patient arrived dressed in paper scrubs:  Hospital gown and scrub pants. Body assessment and safety check completed by Donnie Thompson and Scar Rinaldi  no contraband discovered. Patient belongings and valuables was cataloged and accounted for by Donnie Thompson and Scar Rinaldi. Admission completed by Roland  Oriented to unit, unit policy and expectations:  yes    Reviewed and explained all legal documents:  yes    Education for Fall Prevention and Restraints given: yes    Patient signed all legal documents yes   Pt verbalizes understanding:yes     Marlyxena Orozco Obtained? yes    Identifies stressors. yes  Homelessness;family and financial.    COVID TEACHING: Nursing provided education regarding COVID for social distancing, wearing masks, washing hands, and reporting any symptoms: yes  Mask Provided: yes If patient refused, reason:          Admission Note:   Patient arrived to [facility] adult unit with security staff via wheelchair. Patient was alert, oriented, calm and cooperative with staff. patient has a flat, sad affect during interview with minimal eye contact. Patient is fidgity during interview. Patient was alternating between sitting and standing during interview stating he has to do this due to his back injury. patient reports that he has been dealing this injury for a while now. Patient stated that he has been feeling more depressed and angry lately and like he wanted to hurt himself or other people who have either done him wrong or hurt him in the past. Patient states that he has thoughts of killing himself and plans of ways to do so. patient reports trying to commit suicide August 16th of this year. Patient stated that he also has thoughts of wanting to hurt or kill his mother and others that have hurt him. Patient stated detailed ways he has thought of to kill his mother.  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 Patient also states this makes his gait unsteady and he is walking with an akward shuffle. Patient denies AVH at this time. Will continue to monitor via 15 minute rounds.                 Electronically signed by Pa Salas RN on 9/8/21 at 1:55 AM CDT

## 2021-09-08 NOTE — ED NOTES
Pt signed voluntary [facility] admission form witnessed by this RN       Tanner Abraham RN  09/07/21 2033

## 2021-09-08 NOTE — ED PROVIDER NOTES
[facility] 6 ADULT [facility]  eMERGENCYdEPARTMENT eNCOUnter      Pt Name: Tess Shelton  MRN: [id number]  [place] 1980  Date of evaluation: 9/7/2021  Provider:MIGUEL ÁNGEL Paul    CHIEF COMPLAINT       Chief Complaint   Patient presents with   [address] Problem     pt states thoughts of hurting himself at this time         HISTORY OF PRESENT ILLNESS  (Location/Symptom, Timing/Onset, Context/Setting, Quality, Duration, Modifying Factors, Severity.)   Tess Shelton is a 39 y.o. male who presents to the emergency department with complaints of hurting himself and others intermittently he tells me he would never act on \"I had too much to lose. \" he tells me hasnt been working over the past year. He has back problems prior fx and this has been a trigger for spiraling. He now states he has nothing to lose and it is scaring him about harming himself and others. He denies voices or hallucinations. Denies drugs usage admits to drinking a couple of beers to get his nerves calm. He denies medical problems otherwise. These are people that have wronged him. He has poor social support not currently seeing PCP or psychiatrist states he is not currently on meds \"they havent helped. \"    [facility]    Nursing Notes were reviewed and I agree. REVIEW OF SYSTEMS    (2-9 systems for level 4, 10 or more for level 5)     Review of Systems   Constitutional: Negative for activity change, appetite change, chills and fever. HENT: Negative for congestion and dental problem. Eyes: Negative for photophobia, discharge and itching. Respiratory: Negative for apnea, cough and shortness of breath. Cardiovascular: Negative for chest pain. Musculoskeletal: Negative for arthralgias, back pain, gait problem, myalgias and neck pain. Skin: Negative for color change, pallor and rash. Neurological: Negative for dizziness, seizures and syncope. Psychiatric/Behavioral: Positive for suicidal ideas. Negative for agitation. The patient is not nervous/anxious. Homicidal ideation        Except as noted above the remainder of the review of systems was reviewed and negative. PAST MEDICAL HISTORY     Past Medical History:   Diagnosis Date    Anxiety     Depression     Panic attack     [facility] spotted fever 2005         SURGICAL HISTORY       Past Surgical History:   Procedure Laterality Date    LYMPHADENECTOMY Left     removed when was a child, early 80's    OTHER SURGICAL HISTORY       LYMPTH NODE REMOVED IN NECK AS A CHILD AFTER CAT SCRATCH         CURRENT MEDICATIONS       Current Discharge Medication List      CONTINUE these medications which have NOT CHANGED    Details   meloxicam (MOBIC) 15 MG tablet Take 1 tablet by mouth daily  Qty: 30 tablet, Refills: 0      gabapentin (NEURONTIN) 300 MG capsule Take 1 capsule by mouth 3 times daily for 14 days. Darlis Blacklick: 42 capsule, Refills: 0    Associated Diagnoses: Mild episode of recurrent major depressive disorder (HCC)      venlafaxine (EFFEXOR XR) 150 MG extended release capsule Take 1 capsule by mouth daily (with breakfast) for 14 days  Qty: 14 capsule, Refills: 0      nicotine (NICODERM CQ) 21 MG/24HR Place 1 patch onto the skin daily for 14 days  Qty: 14 patch, Refills: 0      folic acid (FOLVITE) 1 MG tablet Take 1 tablet by mouth daily  Qty: 30 tablet, Refills: 0      vitamin B-12 500 MCG tablet Take 1 tablet by mouth daily  Qty: 30 tablet, Refills: 0      ergocalciferol (ERGOCALCIFEROL) 01317 units capsule Take 1 capsule by mouth once a week for 10 doses  Qty: 10 capsule, Refills: 0             ALLERGIES     Patient has no known allergies.     FAMILY HISTORY       Family History   Problem Relation Age of Onset    Diabetes Father     Heart Disease Father           SOCIAL HISTORY       Social History     Socioeconomic History    Marital status: Single     Spouse name: None    Number of children: None    Years of education: None    Highest education level: None   Occupational History    None Tobacco Use    Smoking status: Current Every Day Smoker     Packs/day: 2.00     Types: Cigarettes    Smokeless tobacco: Never Used   Vaping Use    Vaping Use: Never used   Substance and Sexual Activity    Alcohol use: No    Drug use: No    Sexual activity: None   Other Topics Concern    None   Social History Narrative    None     Social Determinants of Health     Financial Resource Strain:     Difficulty of Paying Living Expenses:    Food Insecurity:     Worried About Running Out of Food in the Last Year:     Ran Out of Food in the Last Year:    Transportation Needs:     Lack of Transportation (Medical):  Lack of Transportation (Non-Medical):    Physical Activity:     Days of Exercise per Week:     Minutes of Exercise per Session:    Stress:     Feeling of Stress :    Social Connections:     Frequency of Communication with Friends and Family:     Frequency of Social Gatherings with Friends and Family:     Attends [employer] Services:     Active Member of Clubs or Organizations:     Attends Club or Organization Meetings:     Marital Status:    Intimate Partner Violence:     Fear of Current or Ex-Partner:     Emotionally Abused:     Physically Abused:     Sexually Abused:        SCREENINGS    Bagwell Coma Scale  Eye Opening: Spontaneous  Best Verbal Response: Oriented  Best Motor Response: Obeys commands  Bagwell Coma Scale Score: 15      PHYSICAL EXAM    (up to 7 forlevel 4, 8 or more for level 5)     ED Triage Vitals [09/07/21 2013]   BP Temp Temp src Pulse Resp SpO2 Height Weight   126/88 98.7 °F (37.1 °C) -- 93 17 97 % 5' 9\" (1.753 m) 190 lb (86.2 kg)       Physical Exam  Vitals and nursing note reviewed. Constitutional:       General: He is not in acute distress. Appearance: Normal appearance. He is well-developed. He is not diaphoretic. HENT:      Head: Normocephalic and atraumatic.       Right Ear: Tympanic membrane, ear canal and external ear normal.      Left Ear: Tympanic membrane, ear canal and external ear normal.      Nose: Nose normal.      Mouth/Throat:      Mouth: Mucous membranes are moist.   Eyes:      Pupils: Pupils are equal, round, and reactive to light. Neck:      Trachea: No tracheal deviation. Cardiovascular:      Rate and Rhythm: Normal rate and regular rhythm. Pulses: Normal pulses. Heart sounds: Normal heart sounds. No murmur heard. Pulmonary:      Effort: Pulmonary effort is normal.      Breath sounds: Normal breath sounds. No stridor. No wheezing. Chest:      Chest wall: No tenderness. Abdominal:      General: Abdomen is flat. Bowel sounds are normal. There is no distension. Palpations: Abdomen is soft. Tenderness: There is no abdominal tenderness. Musculoskeletal:         General: Normal range of motion. Cervical back: Normal range of motion and neck supple. Skin:     General: Skin is warm and dry. Capillary Refill: Capillary refill takes less than 2 seconds. Neurological:      General: No focal deficit present. Mental Status: He is alert and oriented to person, place, and time. Psychiatric:         Mood and Affect: Mood normal.         Behavior: Behavior normal.         Thought Content:  Thought content normal.         Judgment: Judgment normal.           DIAGNOSTIC RESULTS     RADIOLOGY:   Non-plain film images such as CT, Ultrasound and MRI are read by the radiologist. Plain radiographic images are visualized and preliminarilyinterpreted by Senthil Castle MD with the below findings:      Interpretation per the Radiologist below, if available at the time of this note:    No orders to display       LABS:  Labs Reviewed   CBC WITH AUTO DIFFERENTIAL - Abnormal; Notable for the following components:       Result Value    MCHC 32.1 (*)     Monocytes % 11.5 (*)     All other components within normal limits   COMPREHENSIVE METABOLIC PANEL W/ REFLEX TO MG FOR LOW K - Abnormal; Notable for the following components: BUN 4 (*)     All other components within normal limits   COVID-19, RAPID   ETHANOL   URINE DRUG SCREEN   ACETAMINOPHEN LEVEL   SALICYLATE LEVEL   URINE RT REFLEX TO CULTURE       All other labs were within normal range or notreturned as of this dictation. RE-ASSESSMENT        EMERGENCY DEPARTMENT COURSE and DIFFERENTIAL DIAGNOSIS/MDM:   Vitals:    Vitals:    09/07/21 2013 09/08/21 0100   BP: 126/88 122/85   Pulse: 93 89   Resp: 17 18   Temp: 98.7 °F (37.1 °C) 97.3 °F (36.3 °C)   TempSrc:  Temporal   SpO2: 97% 97%   Weight: 190 lb (86.2 kg) 185 lb (83.9 kg)   Height: 5' 9\" (1.753 m) 5' 9\" (1.753 m)         MDM  Patient remains medically stable here I have spoken with Dr. Dee Hager with psychiatry agrees to admit under his care to psych unit patient is voluntary. PROCEDURES:    Procedures      FINAL IMPRESSION      1. Suicidal ideations    2. Homicidal ideations          DISPOSITION/PLAN   DISPOSITION Decision To Admit 09/07/2021 09:21:56 PM      PATIENT REFERRED TO:  No follow-up provider specified.     DISCHARGE MEDICATIONS:  Current Discharge Medication List          (Please note that portions of this note were completed with a voice recognition program.  Efforts were made to edit the dictations but occasionallywords are mis-transcribed.)    Moe Murphy [address], [place]  09/08/21 8789

## 2021-09-08 NOTE — SUICIDE SAFETY PLAN
SAFETY PLAN    A suicide Safety Plan is a document that supports someone when they are having thoughts of suicide. Warning Signs that indicate a suicidal crisis may be developing: What (situations, thoughts, feelings, body sensations, behaviors, etc.) do you experience that lets you know you are beginning to think about suicide? 1. Complete hopelessness  2. Despair  3. Lost sight of anything positive    Internal Coping Strategies:  What things can I do (relaxation techniques, hobbies, physical activities, etc.) to take my mind off my problems without contacting another person? 1. Recognition of a problem with thinking  2. Simplify  3. Remind myself of reality    People and social settings that provide distraction: Who can I call or where can I go to distract me? 1. Name: Saturnino Covarrubiass  Phone:   2. Name:   Phone:    3. Place: Alone            4. Place: [address] [place] [address] whom I can ask for help: Who can I call when I need help - for example, friends, family, clergy, someone else? 1. Name: Help hotline               Phone:   2. Name:   Phone:   3. Name:   Phone:     Professionals or [address] [place] [address] I can contact during a crisis: Who can I call for help - for example, my doctor, my psychiatrist, my psychologist, a mental health provider, a suicide hotline? 1. Clinician Name: [id number]   Phone: [phone]      Clinician Pager or Emergency Contact #: [phone]    2. Clinician Name: [address]   Phone: [phone]      Clinician Pager or Emergency Contact #: [phone]    3. Suicide Prevention Lifeline: 3-691-515-TALK (3586)    4. [facility]al [facility] Emergency [facility] : [facility]      Emergency Services Address: [facility] [address] [facility]  Emergency Services Phone: [phone]    Making the environment safe: How can I make my environment (house/apartment/living space) safer?  For example, can I remove guns, medications, and other items? 1. Remove all guns and weapons from the home. 2. Discard any extra medications in the home. I want to live for life solutions.

## 2021-09-08 NOTE — PROGRESS NOTES
Requirement Note     SW met with pt to complete Psychosocial and CSSR-S on this date. Patients long and short term goals discussed. Patient voiced understanding. Treatment plan sheet signed. Patient verbalized understanding of the treatment plan. Patient participated in goals and objectives of the treatment plan. Patient completed safety plan with , patient received copy of plan, and original was placed into patient's chart. SW explained treatment goals with pt. Decreasing depression and anxiety by improvement of positive coping patterns was discussed. Pt acknowledged understanding of treatment goals and signed treatment plan signature sheet. In the last 6 months has the pt been a danger to self: YES  In the last 6 months has the pt been a danger to others: YES  Legal Guardian/POA: NO     Provided patient with "[employer] Online handout entitled \"Quitting Smoking. \"  Reviewed handout with patient: addressing dangers of smoking, developing coping skills, and providing basic information about quitting. Patient received all components practical counseling of tobacco practical counseling during the hospital stay.

## 2021-09-08 NOTE — PLAN OF CARE
Problem: Suicide risk  Goal: Provide patient with safe environment  Description: Provide patient with safe environment  Outcome: Ongoing     Problem: Falls - Risk of:  Goal: Will remain free from falls  Description: Will remain free from falls  Outcome: Ongoing  Goal: Absence of physical injury  Description: Absence of physical injury  Outcome: Ongoing     Problem: Anger Management/Homicidal Ideation:  Goal: Ability to verbalize frustrations and anger appropriately will improve  Description: Ability to verbalize frustrations and anger appropriately will improve  Outcome: Ongoing  Goal: Absence of homicidal ideation  Description: Absence of homicidal ideation  Outcome: Ongoing  Goal: Participates in care planning  Description: Participates in care planning  Outcome: Ongoing  Goal: Patient specific goal  Description: Patient specific goal  Outcome: Ongoing     Problem: Depressive Behavior With or Without Suicide Precautions:  Goal: Able to verbalize acceptance of life and situations over which he or she has no control  Description: Able to verbalize acceptance of life and situations over which he or she has no control  Outcome: Ongoing  Goal: Able to verbalize and/or display a decrease in depressive symptoms  Description: Able to verbalize and/or display a decrease in depressive symptoms  Outcome: Ongoing  Goal: Ability to disclose and discuss suicidal ideas will improve  Description: Ability to disclose and discuss suicidal ideas will improve  Outcome: Ongoing  Goal: Absence of self-harm  Description: Absence of self-harm  Outcome: Ongoing  Goal: Patient specific goal  Description: Patient specific goal  Outcome: Ongoing  Goal: Participates in care planning  Description: Participates in care planning  Outcome: Ongoing

## 2021-09-08 NOTE — PROGRESS NOTES
Treatment Team Note:    ASHLEY met with [place] team to discuss Pts [address] plans. Progress/Behavior/Group Attendance: TBD    Target Symptoms/Reason for admission: Reason for admission/Target Symptom: Patient admitted to [facility] due to [facility] presents to the emergency department with complaints of hurting himself and others intermittently he tells me he would never act on \"I had too much to lose. \" he tells me hasnt been working over the past year. He has back problems prior fx and this has been a trigger for spiraling. He now states he has nothing to lose and it is scaring him about harming himself and others. He denies voices or hallucinations. Denies drugs usage admits to drinking a couple of beers to get his nerves calm. He denies medical problems otherwise. These are people that have wronged him. He has poor social support not currently seeing PCP or psychiatrist states he is not currently on meds \"they havent helped  Diagnoses: Severe episode of recurrent major depressive disorder, without psychotic features , Tobacco use disorder  UDS: Neg  BAL:  61    AftercarePlan: [address] lives with: ASHLEY will meet with pt to gather information. Collateral obtained from: ASHLEY will meet with pt to gather release of information.   On:    Family Session: RAFAEL    Misc:

## 2021-09-08 NOTE — PROGRESS NOTES
Group Therapy Note    Start Time: 800  End Time:  323  Number of Participants: 7    Type of Group: Community Meeting       Patient's Goal:        Notes:  Patient didn't have any goals today    Participation Level:  Active Listener       Participation Quality: Appropriate      Thought Process/Content: Logical      Affective Functioning: Congruent      Mood: calm      Level of consciousness:  Alert      Modes of Intervention: Support      Discipline Responsible: Behavioral Health Tech II      Signature:  Saroj De La Rosa
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
## 2021-09-08 NOTE — PROGRESS NOTES
Admission Note      Reason for admission/Target Symptom: Patient admitted to [facility] due to male who presents to the emergency department with complaints of hurting himself and others intermittently he tells me he would never act on \"I had too much to lose. \" he tells me hasnt been working over the past year. He has back problems prior fx and this has been a trigger for spiraling. He now states he has nothing to lose and it is scaring him about harming himself and others. He denies voices or hallucinations. Denies drugs usage admits to drinking a couple of beers to get his nerves calm. He denies medical problems otherwise. These are people that have wronged him. He has poor social support not currently seeing PCP or psychiatrist states he is not currently on meds \"they havent helped  Diagnoses: Depression NOS  UDS: Neg  BAL:  61    SW met with treatment team to discuss patient's treatment including care planning, discharge planning, and follow-up needs. Pt has been admitted to [facility]. Treatment team has identified patient's discharge needs as medication management and outpatient therapy/counseling. Pt confirmed  the need for ongoing treatment post inpatient stay. Pt was also provided a handout of contact information for drug and alcohol treatment centers and other community support service such as [facility], AA, and [facility].
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
## 2021-09-09 LAB
TSH REFLEX FT4: 0.83 UIU/ML (ref 0.35–5.5)
VITAMIN B-12: 418 PG/ML (ref 211–946)
VITAMIN D 25-HYDROXY: 27.1 NG/ML

## 2021-09-09 PROCEDURE — 82607 VITAMIN B-12: CPT

## 2021-09-09 PROCEDURE — 84443 ASSAY THYROID STIM HORMONE: CPT

## 2021-09-09 PROCEDURE — 6370000000 HC RX 637 (ALT 250 FOR IP): Performed by: NURSE PRACTITIONER

## 2021-09-09 PROCEDURE — 82306 VITAMIN D 25 HYDROXY: CPT

## 2021-09-09 PROCEDURE — 99231 SBSQ HOSP IP/OBS SF/LOW 25: CPT | Performed by: NURSE PRACTITIONER

## 2021-09-09 PROCEDURE — 1240000000 HC EMOTIONAL WELLNESS R&B

## 2021-09-09 PROCEDURE — 36415 COLL VENOUS BLD VENIPUNCTURE: CPT

## 2021-09-09 PROCEDURE — 6370000000 HC RX 637 (ALT 250 FOR IP): Performed by: PSYCHIATRY & NEUROLOGY

## 2021-09-09 RX ORDER — DULOXETIN HYDROCHLORIDE 20 MG/1
20 CAPSULE, DELAYED RELEASE ORAL DAILY
Qty: 14 CAPSULE | Refills: 0 | Status: SHIPPED | OUTPATIENT
Start: 2021-09-10 | End: 2021-09-24

## 2021-09-09 RX ORDER — TRAZODONE HYDROCHLORIDE 50 MG/1
50 TABLET ORAL NIGHTLY PRN
Qty: 14 TABLET | Refills: 0 | Status: SHIPPED | OUTPATIENT
Start: 2021-09-09 | End: 2021-09-23

## 2021-09-09 RX ADMIN — TRAZODONE HYDROCHLORIDE 50 MG: 50 TABLET ORAL at 20:49

## 2021-09-09 RX ADMIN — GABAPENTIN 300 MG: 300 CAPSULE ORAL at 07:36

## 2021-09-09 RX ADMIN — DULOXETINE HYDROCHLORIDE 20 MG: 20 CAPSULE, DELAYED RELEASE ORAL at 07:36

## 2021-09-09 RX ADMIN — GABAPENTIN 300 MG: 300 CAPSULE ORAL at 15:02

## 2021-09-09 RX ADMIN — GABAPENTIN 300 MG: 300 CAPSULE ORAL at 20:49

## 2021-09-09 NOTE — H&P
HISTORY and PHYSICAL      CHIEF COMPLAINT:  Depression, SI    Reason for Admission:  Depression, SI    History Obtained From:  Patient, chart    HISTORY OF PRESENT ILLNESS:      The patient is a 39 y.o. male who is admitted to the [place] [address] unit with worsening mood issues. He has no c/o CP or SOA. No abdominal pain or N/V. No dysuria. He has chronic low back pain. No fevers. Past Medical History:        Diagnosis Date    Anxiety     Depression     Panic attack     Roni Mountain spotted fever 2005     Past Surgical History:        Procedure Laterality Date    LYMPHADENECTOMY Left     removed when was a child, early 80's    OTHER SURGICAL HISTORY       LYMPTH NODE REMOVED IN NECK AS A CHILD AFTER CAT SCRATCH         Medications Prior to Admission:    Medications Prior to Admission: meloxicam (MOBIC) 15 MG tablet, Take 1 tablet by mouth daily  gabapentin (NEURONTIN) 300 MG capsule, Take 1 capsule by mouth 3 times daily for 14 days. Heidy Middleton venlafaxine (EFFEXOR XR) 150 MG extended release capsule, Take 1 capsule by mouth daily (with breakfast) for 14 days  nicotine (NICODERM CQ) 21 MG/24HR, Place 1 patch onto the skin daily for 14 days  folic acid (FOLVITE) 1 MG tablet, Take 1 tablet by mouth daily  vitamin B-12 500 MCG tablet, Take 1 tablet by mouth daily  ergocalciferol (ERGOCALCIFEROL) 08102 units capsule, Take 1 capsule by mouth once a week for 10 doses    Allergies:  Patient has no known allergies. Social History:   TOBACCO:   reports that he has been smoking cigarettes. He has been smoking about 2.00 packs per day. He has never used smokeless tobacco.  ETOH:   reports no history of alcohol use. DRUGS:   reports no history of drug use.   MARITAL STATUS:  single  OCCUPATION:  Not working  Patient currently lives on the street      Family History:       Problem Relation Age of Onset    Diabetes Father     Heart Disease Father      REVIEW OF SYSTEMS:  Constitutional: neg  CV: neg  Pulmonary: neg  GI: neg  : neg  Psych:   Neuro: neg  Skin: neg  MusculoSkeletal: chronic back pain  HEENT: neg  Joints: neg    Vitals:  BP (!) 145/73   Pulse 65   Temp 96.3 °F (35.7 °C) (Temporal)   Resp 16   Ht 5' 9\" (1.753 m)   Wt 185 lb (83.9 kg)   SpO2 97%   BMI 27.32 kg/m²     PHYSICAL EXAM:  Gen: NAD, alert  HEENT: WNL  Lymph: no LAD  Neck: no JVD or masses  Chest: CTA bilat  CV: RRR  Abdomen: NT/ND  Extrem: no C/C/E  Neuro: non focal  Skin: no rashes  Joints: no redness    DATA:  I have reviewed the admission labs and imaging tests.     ASSESSMENT AND PLAN:      Principal Problem:    Severe episode of recurrent major depressive disorder, without psychotic features, SI---follow with Psych    Chronic Back Pain---supportive care        Dru Valencia MD  11:40 PM 9/8/2021

## 2021-09-09 NOTE — PLAN OF CARE
Problem: Suicide risk  Goal: Provide patient with safe environment  Description: Provide patient with safe environment  9/9/2021 0415 by Brenda Leigh RN  Outcome: Met This Shift  9/8/2021 1534 by Valdez Contreras RN  Outcome: Ongoing     Problem: Falls - Risk of:  Goal: Will remain free from falls  Description: Will remain free from falls  9/9/2021 0415 by Brenda Leigh RN  Outcome: Met This Shift  9/8/2021 1534 by Valdez Contreras RN  Outcome: Ongoing  Goal: Absence of physical injury  Description: Absence of physical injury  9/9/2021 0415 by Brenda Leigh RN  Outcome: Met This Shift  9/8/2021 1534 by Valdez Contreras RN  Outcome: Ongoing     Problem: Anger Management/Homicidal Ideation:  Goal: Ability to verbalize frustrations and anger appropriately will improve  Description: Ability to verbalize frustrations and anger appropriately will improve  9/9/2021 0415 by Brenda Leigh RN  Outcome: Ongoing  9/8/2021 1618 by Shanti Bellamy  Outcome: Ongoing  9/8/2021 1617 by Shanti Bellamy  Outcome: Ongoing  9/8/2021 1534 by Valdez Contreras RN  Outcome: Ongoing  Goal: Absence of homicidal ideation  Description: Absence of homicidal ideation  9/9/2021 0415 by Brenda Leigh RN  Outcome: Ongoing  9/8/2021 1534 by Valdez Contreras RN  Outcome: Ongoing  Goal: Participates in care planning  Description: Participates in care planning  9/9/2021 0415 by Brenda Leigh RN  Outcome: Ongoing  9/8/2021 1534 by Valdez Contreras RN  Outcome: Ongoing  Goal: Patient specific goal  Description: Patient specific goal  9/9/2021 0415 by Brenda Leigh RN  Outcome: Ongoing  9/8/2021 1534 by Valdez Contreras RN  Outcome: Ongoing     Problem: Depressive Behavior With or Without Suicide Precautions:  Goal: Able to verbalize acceptance of life and situations over which he or she has no control  Description: Able to verbalize acceptance of life and situations over which he or she has no control  9/9/2021 0415 by Brenda Leigh RN  Outcome: Ongoing  9/8/2021 1534 by Jyoti Fox RN  Outcome: Ongoing  Goal: Able to verbalize and/or display a decrease in depressive symptoms  Description: Able to verbalize and/or display a decrease in depressive symptoms  9/9/2021 0415 by Leslie Wright RN  Outcome: Ongoing  9/8/2021 1534 by Jyoti Fox RN  Outcome: Ongoing  Goal: Ability to disclose and discuss suicidal ideas will improve  Description: Ability to disclose and discuss suicidal ideas will improve  9/9/2021 0415 by Leslie Wright RN  Outcome: Met This Shift  9/8/2021 1534 by Jyoti Fox RN  Outcome: Ongoing  Goal: Absence of self-harm  Description: Absence of self-harm  9/9/2021 0415 by Leslie Wright RN  Outcome: Met This Shift  9/8/2021 1534 by Jyoti Fox RN  Outcome: Ongoing  Goal: Patient specific goal  Description: Patient specific goal  9/9/2021 0415 by Leslie Wright RN  Outcome: Ongoing  9/8/2021 1534 by Jyoti Fox RN  Outcome: Ongoing  Goal: Participates in care planning  Description: Participates in care planning  9/9/2021 0415 by Leslie Wright RN  Outcome: Ongoing  9/8/2021 1534 by Jyoti Fox RN  Outcome: Ongoing     Problem: Anxiety:  Goal: Level of anxiety will decrease  Description: Level of anxiety will decrease  9/9/2021 0415 by Leslie Wright RN  Outcome: Ongoing  9/8/2021 1534 by Jyoti Fox RN  Outcome: Ongoing     Problem: Pain:  Goal: Pain level will decrease  Description: Pain level will decrease  9/9/2021 0415 by Leslie Wright RN  Outcome: Ongoing  9/8/2021 1534 by Jyoti Fox RN  Outcome: Ongoing  Goal: Control of acute pain  Description: Control of acute pain  9/9/2021 0415 by Leslie Wright RN  Outcome: Ongoing  9/8/2021 1534 by Jyoti Fox RN  Outcome: Ongoing  Goal: Control of chronic pain  Description: Control of chronic pain  9/9/2021 0415 by Leslie Wright RN  Outcome: Ongoing  9/8/2021 1534 by Jyoti Fox RN  Outcome: Ongoing

## 2021-09-09 NOTE — BH NOTE
[facility] Adult Unit Daily Assessment  Nursing Progress Note    Room: [facility]/609-01   Name: Casey Weeks   Age: 39 y.o. Gender: male   Dx: Severe episode of recurrent major depressive disorder, without psychotic features ([address]  Precautions: suicide risk and fall risk  Inpatient Status: voluntary       SLEEP:    Sleep Quality Good  Sleep Medications: Yes and trazodone   PRN Sleep Meds: Yes       MEDICAL:    Other PRN Meds: No   Med Compliant: Yes  Accu-Chek: No  Oxygen/CPAP/BiPAP: No  CIWA/CINA: No   PAIN Assessment: present - adequately treated  Side Effects from medication: No    Is Patient experiencing any respiratory symptoms (headache, fever, body aches, cough. Anatoly Baltazar ): no  Patient educated by nursing to practice social distancing, wear masks, wash hands frequently: yes      PSYCH:    Depression: same 5   Anxiety: increased   SI denies suicidal ideation   HI Negative for homicidal ideation      AVH:Absent      GENERAL:    Appetite: good    Social: Yes   Speech: normal and hesitant   Appearance: appropriately dressed    GROUP:    Group Participation: Yes  Participation Quality: Active Listener    Notes:   Pt cooperative, anxious this shift, participates in wrap up and social with peers and staff. Pt requesting medications early. Will continue to monitor.        Electronically signed by Michelle Pratt RN on 9/9/21 at 4:30 AM CDT

## 2021-09-09 NOTE — PLAN OF CARE
Problem: Suicide risk  Goal: Provide patient with safe environment  Description: Provide patient with safe environment  9/9/2021 1103 by Elliott Dugan RN  Outcome: Ongoing  9/9/2021 0415 by Parrish Eagle RN  Outcome: Met This Shift     Problem: Falls - Risk of:  Goal: Will remain free from falls  Description: Will remain free from falls  9/9/2021 1103 by Elliott Dugan RN  Outcome: Ongoing  9/9/2021 0415 by Parrish Eagle RN  Outcome: Met This Shift  Goal: Absence of physical injury  Description: Absence of physical injury  9/9/2021 1103 by Elliott Dugan RN  Outcome: Ongoing  9/9/2021 0415 by Parrish Eagle RN  Outcome: Met This Shift     Problem: Anger Management/Homicidal Ideation:  Goal: Ability to verbalize frustrations and anger appropriately will improve  Description: Ability to verbalize frustrations and anger appropriately will improve  9/9/2021 1103 by Elliott Dugan RN  Outcome: Ongoing  9/9/2021 0415 by Parrish Eagle RN  Outcome: Ongoing  Goal: Absence of homicidal ideation  Description: Absence of homicidal ideation  9/9/2021 1103 by Elliott Dugan RN  Outcome: Ongoing  9/9/2021 0415 by Parrish Eagle RN  Outcome: Ongoing  Goal: Participates in care planning  Description: Participates in care planning  9/9/2021 1103 by Elliott Dugan RN  Outcome: Ongoing  9/9/2021 0415 by Parrish Eagle RN  Outcome: Ongoing  Goal: Patient specific goal  Description: Patient specific goal  9/9/2021 1103 by Elliott Dugan RN  Outcome: Ongoing  9/9/2021 0415 by Parrish Eagle RN  Outcome: Ongoing     Problem: Depressive Behavior With or Without Suicide Precautions:  Goal: Able to verbalize acceptance of life and situations over which he or she has no control  Description: Able to verbalize acceptance of life and situations over which he or she has no control  9/9/2021 1103 by Elliott Dugan RN  Outcome: Ongoing  9/9/2021 1039 by Armani Rebolledo  Outcome: Ongoing  Note: Group Therapy Note    Date: 9/9/2021  Start Time: 1000  End Time:  1030  Number of Participants: 10    Type of Group: Psychoeducation    Wellness Binder Information  Module Name:  Men's [place]  Session Number:  1    Group Goal for Pt: To improve knowledge of effective stress management techniques    Notes:  Pt demonstrated improved knowledge of effective stress management techniques by actively participating in group discussion. Status After Intervention:  Unchanged    Participation Level:  Active Listener    Participation Quality: Appropriate and Attentive      Speech:  normal      Thought Process/Content: Logical      Affective Functioning: Congruent      Mood: anxious and depressed      Level of consciousness:  Alert and Oriented x4      Response to Learning: Able to verbalize current knowledge/experience, Able to verbalize/acknowledge new learning, and Progressing to goal      Endings: None Reported    Modes of Intervention: Education      Discipline Responsible: Psychoeducational Specialist      Signature:  Orlin Gan    9/9/2021 0415 by Wiley Rashid RN  Outcome: Ongoing  Goal: Able to verbalize and/or display a decrease in depressive symptoms  Description: Able to verbalize and/or display a decrease in depressive symptoms  9/9/2021 1103 by Mitesh Brambila RN  Outcome: Ongoing  9/9/2021 0415 by Wiley Rashid RN  Outcome: Ongoing  Goal: Ability to disclose and discuss suicidal ideas will improve  Description: Ability to disclose and discuss suicidal ideas will improve  9/9/2021 1103 by Mitesh Brambila RN  Outcome: Ongoing  9/9/2021 0415 by Wiley Rashid RN  Outcome: Met This Shift  Goal: Absence of self-harm  Description: Absence of self-harm  9/9/2021 1103 by Mitesh Brambila RN  Outcome: Ongoing  9/9/2021 0415 by Wiley Rashid RN  Outcome: Met This Shift  Goal: Patient specific goal  Description: Patient specific goal  9/9/2021 1103 by Mitesh Brambila RN  Outcome: Ongoing  9/9/2021 0415 by Brenda Leigh RN  Outcome: Ongoing  Goal: Participates in care planning  Description: Participates in care planning  9/9/2021 1103 by Gina Alves RN  Outcome: Ongoing  9/9/2021 0415 by Brenda Leigh RN  Outcome: Ongoing     Problem: Anxiety:  Goal: Level of anxiety will decrease  Description: Level of anxiety will decrease  9/9/2021 1103 by Gina Alves RN  Outcome: Ongoing  9/9/2021 0415 by Brenda Leigh RN  Outcome: Ongoing     Problem: Pain:  Goal: Pain level will decrease  Description: Pain level will decrease  9/9/2021 1103 by Gina Alves RN  Outcome: Ongoing  9/9/2021 0415 by Brenda Leigh RN  Outcome: Ongoing  Goal: Control of acute pain  Description: Control of acute pain  9/9/2021 1103 by Gina Alves RN  Outcome: Ongoing  9/9/2021 0415 by Brenda Leigh RN  Outcome: Ongoing  Goal: Control of chronic pain  Description: Control of chronic pain  9/9/2021 1103 by Gina Alves RN  Outcome: Ongoing  9/9/2021 0415 by Brenda Leigh RN  Outcome: Ongoing

## 2021-09-09 NOTE — PROGRESS NOTES
[address] [place] [address]      Psychiatric Progress Note    Name:  Francheska Trinidad  Date:  9/9/2021  Age:  39 y.o. Sex:  male  Ethnicity:   Primary Care Physician:  No primary care provider on file. Patient Care Team:  No care team member to display  Chief Complaint: \" I am feeling better today. \"        Historian:patient  Complaint Type: anxiety, decreased appetite, depression, fatigue, loss of interest in favorite activities, sleep disturbance and tobacco use  Course of Symptoms: improved  Precipitating Factors: homeless, history of depression       Subjective  Nursing notes were reviewed and patient had no behavioral issues during the night. As needed medications administered during the night include Trazodone. Today he reports that he has chronic suicidal thoughts however does not have any plans. He denies homicidal ideation and psychosis. He reports that his back is feeling better today. He has been social with certain peers. Reports that [facility] was helping him with temporary housing.  called them and they reported that he just had to show up there before 3 pm tomorrow and they would help him find placement. Patient reports sleep as \"It was better, I slept about 5 hours. \" Patient has been calm and cooperative with staff and peers. Patient has been compliant with medications. Patient has been attending groups. Patient reports appetite as \"it was really good. \"           Patient reports no side effects from medications.       Objective  Vitals:    09/09/21 0755   BP: 134/84   Pulse: 77   Resp: 16   Temp: 96.3 °F (35.7 °C)   SpO2: 96%       Previous Psychiatric/Substance Use History      Medical History:  Past Medical History:   Diagnosis Date    Anxiety     Depression     Panic attack     Roni Mountain spotted fever 2005        GAR History:   Social History     Substance and Sexual Activity   Alcohol Use No         Social History     Substance and Sexual Activity   Drug Use No        Social History     Tobacco Use   Smoking Status Current Every Day Smoker    Packs/day: 2.00    Types: Cigarettes   Smokeless Tobacco Never Used        Family History:     Family History   Problem Relation Age of Onset    Diabetes Father     Heart Disease Father        Mental Status:  Level of consciousness:  within normal limits and awake  Appearance:  well-appearing, hospital attire, in chair, good grooming and good hygiene  Behavior/Motor:  no abnormalities noted  Attitude toward examiner:  cooperative, attentive and good eye contact  Speech:  normal rate and normal volume  Mood:  \" I am feeling better today. \"  Affect:  mood congruent  Thought processes:  linear and goal directed  Thought content:  Homocidal ideation :denies  Suicidal Ideation:  denies suicidal ideation  Delusions:  no evidence of delusions  Perceptual Disturbance:  denies any perceptual disturbance  Cognition:  oriented to person, place, and time  Concentration : good  Memory intact for recent and remote  Fund of knowledge: average  Abstract thinking: adequate  Insight: good  Judgment:  good     gabapentin  300 mg Oral TID    DULoxetine  20 mg Oral Daily    nicotine  1 patch TransDERmal Daily    covid-19 vaccine  1 Dose IntraMUSCular Prior to discharge       Current Medications:  Current Facility-Administered Medications   Medication Dose Route Frequency Provider Last Rate Last Admin    gabapentin (NEURONTIN) capsule 300 mg  300 mg Oral TID Alex Flores APRN   300 mg at 09/09/21 0736    DULoxetine (CYMBALTA) extended release capsule 20 mg  20 mg Oral Daily Alex Flores APRN   20 mg at 09/09/21 0736    acetaminophen (TYLENOL) tablet 650 mg  650 mg Oral Q4H PRN Ed Lozoya MD        polyethylene glycol [facility]) packet 17 g  17 g Oral Daily PRN Ed Lozoya MD        traZODone (DESYREL) tablet 50 mg  50 mg Oral Nightly PRN Ed Lozoya MD   50 mg at 09/08/21 2103    nicotine (NICODERM CQ) 21 MG/24HR 1 patch  1 patch TransDERmal Daily Gricelda Watson MD   1 patch at 09/09/21 0737    COVID-19 Ad26 Vaccine (J&J, LETTY) injection 0.5 mL  1 Dose IntraMUSCular Prior to discharge Gricelda Watson MD           Psychotherapy:   SUPPORTIVE    DSM V Diagnoses:    Principal Problem:    Severe episode of recurrent major depressive disorder, without psychotic features ([address]  Active Problems:    Suicidal ideation    Tobacco use disorder    Homicidal ideations  Resolved Problems:    Depression with suicidal ideation            Plan:    Encourage group therapy  15 minute safety checks  Medical monitoring by Dr. Luz Hoang and associates  Continue current therapy and medications  Possible suicidal ideation    Amount of time spent with patient:  15 minutes with greater than 50% of the time spent in counseling and collaboration of care.     MICHAEL Rodriguez  Clinician Signature: signed electronically

## 2021-09-09 NOTE — PROGRESS NOTES
Group Note    Number of Participants in Group: 12  Number of Patients on Unit:12      Patient attended group:Yes  Reason for Absence:  Intervention for patient absence:        Type of Group:   Wrap-Up/Relaxation    Patient's Goal: See wrap up group sheet    Participation Level:    Monopolizing, Minimal and None           Patient Response to Learning: Yes    Patient's Behavior: Anxious, Cooperative and Pleasant    Is Patient Social/Interacting: Yes    Relaxation:   Television:Yes   Reading:No   Game/Puzzle:No   Phone: Yes       Notes/Comments: pt social with staff and peers, participates with activities, pt stated that he tried a little today for improvement, one to one with staff.  Pt pleasant and cooperative      Please see patient's wrap up group sheet for patient's comments       Electronically signed by Germán Anderson RN on 9/9/21 at 3:35 AM CDT

## 2021-09-09 NOTE — PLAN OF CARE
Problem: Depressive Behavior With or Without Suicide Precautions:  Goal: Able to verbalize acceptance of life and situations over which he or she has no control  Description: Able to verbalize acceptance of life and situations over which he or she has no control  9/9/2021 1039 by Valencia Quintero  Outcome: Ongoing  Note:                                                                     Group Therapy Note    Date: 9/9/2021  Start Time: 1000  End Time:  1030  Number of Participants: 10    Type of Group: Psychoeducation    Wellness Binder Information  Module Name:  Men's Issues  Session Number:  1    Group Goal for Pt: To improve knowledge of effective stress management techniques    Notes:  Pt demonstrated improved knowledge of effective stress management techniques by actively participating in group discussion. Status After Intervention:  Unchanged    Participation Level:  Active Listener    Participation Quality: Appropriate and Attentive      Speech:  normal      Thought Process/Content: Logical      Affective Functioning: Congruent      Mood: anxious and depressed      Level of consciousness:  Alert and Oriented x4      Response to Learning: Able to verbalize current knowledge/experience, Able to verbalize/acknowledge new learning, and Progressing to goal      Endings: None Reported    Modes of Intervention: Education      Discipline Responsible: Psychoeducational Specialist      Signature:  Valencia Quintero

## 2021-09-10 VITALS
RESPIRATION RATE: 20 BRPM | HEART RATE: 76 BPM | DIASTOLIC BLOOD PRESSURE: 74 MMHG | BODY MASS INDEX: 27.4 KG/M2 | TEMPERATURE: 96.8 F | HEIGHT: 69 IN | OXYGEN SATURATION: 98 % | SYSTOLIC BLOOD PRESSURE: 116 MMHG | WEIGHT: 185 LBS

## 2021-09-10 PROCEDURE — 6370000000 HC RX 637 (ALT 250 FOR IP): Performed by: NURSE PRACTITIONER

## 2021-09-10 PROCEDURE — 5130000000 HC BRIDGE APPOINTMENT

## 2021-09-10 PROCEDURE — 6370000000 HC RX 637 (ALT 250 FOR IP): Performed by: PSYCHIATRY & NEUROLOGY

## 2021-09-10 PROCEDURE — 99238 HOSP IP/OBS DSCHRG MGMT 30/<: CPT | Performed by: NURSE PRACTITIONER

## 2021-09-10 RX ORDER — GABAPENTIN 300 MG/1
300 CAPSULE ORAL 3 TIMES DAILY
Qty: 21 CAPSULE | Refills: 0 | Status: SHIPPED | OUTPATIENT
Start: 2021-09-10 | End: 2021-09-17

## 2021-09-10 RX ADMIN — DULOXETINE HYDROCHLORIDE 20 MG: 20 CAPSULE, DELAYED RELEASE ORAL at 08:07

## 2021-09-10 RX ADMIN — GABAPENTIN 300 MG: 300 CAPSULE ORAL at 08:07

## 2021-09-10 NOTE — PLAN OF CARE
Problem: Depressive Behavior With or Without Suicide Precautions:  Goal: Able to verbalize acceptance of life and situations over which he or she has no control  Description: Able to verbalize acceptance of life and situations over which he or she has no control  Outcome: Ongoing  Note:                                                                     Group Therapy Note    Date: 9/10/2021  Start Time: 1000  End Time:  1030  Number of Participants: 10    Type of Group: Psychoeducation    Wellness Binder Information  Module Name:  Relapse Prevention  Session Number:  5    Group Goal for Pt: To improve knowledge of relapse prevention strategies    Notes:  Pt demonstrated improved knowledge of relapse prevention strategies by actively participating in group discussion. Status After Intervention:  Unchanged    Participation Level:  Active Listener and Interactive    Participation Quality: Appropriate and Attentive      Speech:  normal      Thought Process/Content: Logical      Affective Functioning: Congruent      Mood: anxious and depressed      Level of consciousness:  Alert and Oriented x4      Response to Learning: Able to verbalize current knowledge/experience, Able to verbalize/acknowledge new learning, and Progressing to goal      Endings: None Reported    Modes of Intervention: Education      Discipline Responsible: Psychoeducational Specialist      Signature:  Mike Nathan

## 2021-09-10 NOTE — PROGRESS NOTES
Progress Note  Bill Long  9/10/2021 7:11 AM  Subjective:   Admit Date:   9/7/2021      CC/ADMIT DX:       Interval History:   Reviewed overnight events and nursing notes. He has no new complaints. I have reviewed all labs/diagnostics from the last 24hrs. ROS:   I have done a 10 point ROS and all are negative, except what is mentioned in the HPI. ADULT DIET; Regular    Medications:      gabapentin  300 mg Oral TID    DULoxetine  20 mg Oral Daily    nicotine  1 patch TransDERmal Daily    covid-19 vaccine  1 Dose IntraMUSCular Prior to discharge           Objective:   Vitals: /89   Pulse 66   Temp 97.3 °F (36.3 °C)   Resp 16   Ht 5' 9\" (1.753 m)   Wt 185 lb (83.9 kg)   SpO2 96%   BMI 27.32 kg/m²  No intake or output data in the 24 hours ending 09/10/21 0711  General appearance: alert and cooperative with exam  Extremities: extremities normal, atraumatic, no cyanosis or edema  Neurologic:  No obvious focal neurologic deficits. Skin: no rashes  Assessment and Plan:   Principal Problem:    Severe episode of recurrent major depressive disorder, without psychotic features ([facility] [address]  Active Problems:    Suicidal ideation    Tobacco use disorder    Homicidal ideations  Resolved Problems:    Depression with suicidal ideation    Back Pain    Plan:  1. Continue present medication(s)   2. Supportive care for pain  3. Follow with Psych      Discharge planning:   home     Reviewed treatment plans with the patient and/or family.              Electronically signed by Jesus Borrego MD on 9/10/2021 at 7:11 AM

## 2021-09-10 NOTE — PROGRESS NOTES
Group Therapy Note    Start Time: 645  End Time:  900  Number of Participants: 13    Type of Group: Community Meeting       Patient's Goal:        Notes:  Patient didn't write any goals    Participation Level:  Active Listener       Participation Quality: Appropriate      Thought Process/Content: Logical      Affective Functioning: Congruent      Mood: calm      Level of consciousness:  Alert      Modes of Intervention: Support      Discipline Responsible: Behavioral Health Tech II      Signature:  Sarah Aj

## 2021-09-10 NOTE — PROGRESS NOTES
[facility] Adult Unit Daily Assessment  Nursing Progress Note    Room: 0609/609-01   Name: Marvin Dike   Age: 39 y.o. Gender: male   Dx: Severe episode of recurrent major depressive disorder, without psychotic features ([address]  Precautions: suicide risk  Inpatient Status: voluntary       SLEEP:    Sleep Quality good  Sleep Medications: Yes trazodone 50 mg  PRN Sleep Meds: No       MEDICAL:    Other PRN Meds: No   Med Compliant: Yes  Accu-Chek: No  Oxygen/CPAP/BiPAP: No  CIWA/CINA: No   PAIN Assessment: none  Side Effects from medication: No    Is Patient experiencing any respiratory symptoms (headache, fever, body aches, cough. Florinda Dye ): no  Patient educated by nursing to practice social distancing, wear masks, wash hands frequently: yes      PSYCH:    Depression: 2  Anxiety: 2   SI denies suicidal ideation   HI Negative for homicidal ideation      AVH:Absent      GENERAL:    Appetite: good    Social: Yes   Speech: normal   Appearance: appropriately dressed and healthy looking    GROUP:    Group Participation: Yes  Participation Quality: Interactive    Notes: Patient was alert and oriented x4. Patient stated that he was worried about having no income. Patient reported that he can not work because of his back issues. Patient mentioned that he was hopeful because he has a place to stay. Patient attended group and was social with his peers. Continue to monitor for safety.          Electronically signed by Andrew Rosario RN on 9/10/21 at 5:59 AM CDT

## 2021-09-10 NOTE — PROGRESS NOTES
Treatment Team Note:     ASHLEY met with [address] team to discuss Pts TX and DC plans.      Progress/Behavior/Group Attendance: TBD     Target Symptoms/Reason for admission: Reason for admission/Target Symptom: [place] [facility] [place] [facility] presents to the emergency department with complaints of hurting himself and others intermittently he tells me he would never act on \"I had too much to lose. \" he tells me hasnt been working over the past year. He has back problems prior fx and this has been a trigger for spiraling. He now states he has nothing to lose and it is scaring him about harming himself and others. He denies voices or hallucinations. Denies drugs usage admits to drinking a couple of beers to get his nerves calm. He denies medical problems otherwise. These are people that have wronged him. He has poor social support not currently seeing PCP or psychiatrist states he is not currently on meds \"they havent helped  Diagnoses: Severe episode of recurrent major depressive disorder, without psychotic features , Tobacco use disorder  UDS: Neg  BAL:  59     AftercarePlan: [address]     Pt lives with: SW will meet with pt to gather information.     Collateral obtained from: SW will meet with pt to gather release of information.   On:     Family Session: RAFAEL     Misc:

## 2021-09-10 NOTE — PROGRESS NOTES
Discharge Note     Pt discharging on this date. Pt denies SI, HI, and AVH at this time. Pt reports improvement in behavior and is leaving unit in overall good condition. SW and pt discussed pt's follow up appointments and importance of attending appointments as scheduled, pt voiced understanding and agreement. Pt and SW also discussed pt safety plan and pt able to verbally identify: warning signs, coping strategies, places and people that help make the pt feel better/distract negative thoughts, friends/family/agencies/professionals the pt can reach out to in a crisis, and something that is important to the pt/worth living for. Pt provided the national suicide prevention hotline number (6-100-850-086-615-7806) as well as local community behavioral health [facility]) crisis number for emergencies (1-457-174-002-028-1659). Pt to follow up with:  [address]) on __/__/21 @ 00:00 AM/PM. Patient will follow up with Dr. Rosa Ibrahim at [facility] for medication management, patient will be seen on __/__/21 @ 00:00 AM/PM for the med management appt.      Referral to out patient tobacco cessation counseling treatment:  Made at discharge with (company) on (date) at (time)  Patient refused referral to outpatient tobacco cessation counseling    SW offered to assist pt with transportation, pt reports

## 2021-09-10 NOTE — PROGRESS NOTES
Yes  Motor Activity: Increased  Interview Behavior: Cooperative  Preception: [place] to Person, Sahra Colonel to Time, [place] to Place, [place] to Situation  Attention:Normal: Yes  Attention: Distractible  Thought Processes: Circumstantial  Thought Content:Normal: No  Thought Content: Preoccupations  Hallucinations: None  Delusions: No  Memory:Normal: Yes  Memory:  (intact for recent and remote)  Insight and Judgment: No  Insight and Judgment: Poor Insight  Present Suicidal Ideation: No  Present Homicidal Ideation: No

## 2021-09-10 NOTE — PLAN OF CARE
Problem: Anger Management/Homicidal Ideation:  Goal: Ability to verbalize frustrations and anger appropriately will improve  Outcome: Ongoing       Group Therapy Note     Date: 9/10/2021  Start Time: 1100  End Time:  1130  Number of Participants: 9     Type of Group: Psychoeducation     Wellness Binder Information  Module Name:  stress     Session Number:  2     Patient's Goal:  recognizing signs of stress     Notes:  pt acknowledged increased anxiety may be a sign of increased stress.      Status After Intervention:  Improved     Participation Level: Interactive     Participation Quality: Appropriate, Attentive, and Sharing        Speech:  normal        Thought Process/Content: Logical        Affective Functioning: Congruent        Mood: congruent        Level of consciousness:  Alert, Oriented x4, and Attentive        Response to Learning: Able to verbalize current knowledge/experience        Endings: None Reported     Modes of Intervention: Education        Discipline Responsible: Psychoeducational Specialist        Signature:  Yury Kelley

## 2021-09-10 NOTE — DISCHARGE SUMMARY
Discharge Summary     Patient ID:  Samir Verdin  [id number]  89 y.o.  1980    Admit date: 9/7/2021  Discharge date: 9/10/2021    Admitting Physician: Sedrick Robertson MD   Attending Physician: Sedrick Robertson MD  Discharge Provider: MICHAEL Garcia     Discharge Diagnoses: Severe episode of recurrent major depressive disorder, without psychotic features     Admission Condition: fair    Discharged Condition: good    Indication for Admission: depression and suicidal ideation    HPI:  Patient is a 80-year-old  male who presents with depression and suicidal ideation. Blood alcohol level 59 in the ER. Urine drug screen negative. He endorses having chronic suicidal thoughts that \"never go away. \"  Also endorses homicidal thoughts toward \"others that have done him harm. \"  He has been unable to work for the past year related to a back injury. He reports he has a \"fractured L5.\"  He currently is walking with a limp and does appear to be in pain. He has had prior psychiatric hospitalizations and prior suicide attempts. He reports that he had \"several thoughts of suicide\" prior to this admission. Current stressors include being unemployed, recently evicted from his home and no support system. Reports that he has been sleeping on the couches of his friends. Denies any prior history of pranav or hypomania. Endorses poor appetite, poor sleep, poor energy and poor concentration for the past 3 weeks. History of alcohol use disorder and reports he only drinks alcohol three times per year now. Denies any illegal drug abuse. Reports that on August 16th he was being evicted from his home and parked his motorcycle in the garage and kept it running in hopes of \"passing out and never waking up again. \" He reports that he is \"not sure how he didn't die. \" He makes no eye contact when speaking. He is interested in medication for depression.  He reports that he ws taking Gabapentin for back pain as well and reports that is the only things that helps. Hospital Course:   Patient was admitted to the adult behavioral health floor and was acclimated to the floor. Labs were reviewed and physical exam was completed by Dr. Aramis Fitzgerald and associates. Home medications were reconciled. CRISTY was obtained and reviewed. Medication changes were made and patient tolerated well with no side effects. Duloxetine was initiated for depression. He was offered a Nicotine Patch for smoking cessation. Trazodone for sleep. Gabapentin was initiated as well to help with nerve pain. He reported that he was homeless and had been working with [facility] to find a shelter.  called [facility] and they reported they would be able to help him find a shelter and get signed up for their transitional housing. He was behaviorally stable during the entire psychiatric hospitalization. He enjoyed socializing with his peers. He reported lower back pain and Xray of his lumbar spine was completed. Result were grade 2 lytic spondylolisthesis at L5-S1 with Neuroforaminal Narrowing. He did declined a [facility] and reported that he would find one closer to [facility] which is where he is from. Patient attended and participated in groups.  Patient was calm and cooperative with staff and peers. As hospitalization progressed his suicidal ideation resolved. He reported that he felt the Duloxetine was \"decreaseing his depression. \" Patient was compliant with his medications. Patient was sleeping through the night. This patient is not suicidal, homicidal or psychotic at discharge. He does not present a danger to self or others. On the day of discharge and transfer of care, patient indicated readiness for discharge. He was not acutely manic nor agitated with no reported symptoms of psychosis. He indicated no thoughts of self-harm or harm to others.  Given resolution of presenting symptoms and patient readiness for discharge and that patient agreed to follow-up with outpatient services with [address] [place] [address] in [place], [place]. The patient was discharged with a 30 day supply of medication. He denied access to firearms or weapons. He was advised to abstain from all drugs and alcohol and to remain adherent to medication as prescribed. Number of antipsychotic medication prescribed at discharge: 0      Referral to addiction treatment: N/A    Prescription for Alcohol or Drug Disorder Medication: N/A    Prescription for Tobacco Cessation medication: PT DECLINED    If no prescriptions for Tobacco Cessation must document why: PT DECLINED    Consults: INTERNAL MEDICINE    Significant Diagnostic Studies: labs:     Lab Results   Component Value Date    WBC 6.9 09/07/2021    HGB 16.0 09/07/2021    HCT 49.9 09/07/2021    MCV 94.0 09/07/2021     09/07/2021     Lab Results   Component Value Date     09/07/2021    K 3.7 09/07/2021     09/07/2021    CO2 24 09/07/2021    BUN 4 09/07/2021    CREATININE 0.9 09/07/2021    GLUCOSE 99 09/07/2021    CALCIUM 9.8 09/07/2021      Lab Results   Component Value Date    TSHFT4 0.83 09/09/2021    TSH 1.600 10/26/2018     Lab Results   Component Value Date    VITD25 27.1 (L) 09/09/2021     Results for Luigi Connor (MRN [id number]) as of 9/10/2021 09:56   Ref. Range 9/7/2021 20:25   Albumin Latest Ref Range: 3.5 - 5.2 g/dL 4.7   Alk Phos Latest Ref Range: 40 - 130 U/L 79   ALT Latest Ref Range: 5 - 41 U/L 38   AST Latest Ref Range: 5 - 40 U/L 17   Bilirubin Latest Ref Range: 0.2 - 1.2 mg/dL 0.3   Total Protein Latest Ref Range: 6.6 - 8.7 g/dL 7.4     Results for Luigi Connor (MRN [id number]) as of 9/10/2021 09:56   Ref.  Range 9/7/2021 20:41   Amphetamine Screen, Urine Latest Ref Range: Negative <1000 ng/mL  Negative   Barbiturate Screen, Ur Latest Ref Range: Negative < 200 ng/mL  Negative   Benzodiazepine Screen, Urine Latest Ref Range: Negative <100 ng/mL  Negative   Cannabinoid Scrn, Ur Latest Ref Range: Negative <50 ng/mL  Negative   Opiate Scrn, Ur Latest Ref Range: Negative < 300 ng/mL  Negative   Cocaine Metabolite Screen, Urine Latest Ref Range: Negative <300 ng/mL  Negative     Results for Wyatt Reyes (MRN [id number]) as of 9/10/2021 09:56   Ref. Range 9/7/2021 21:39 9/8/2021 14:24 9/9/2021 06:09   Vitamin B-12 Latest Ref Range: 211 - 946 pg/mL   418   SARS-CoV-2, NAAT Latest Ref Range: Not Detected  Not Detected       XR LUMBAR SPINE (2-3 VIEWS) 9/8/2021 2:19 PM   HISTORY: Low back pain   Comparison: Radiographs dated 2/5/2019    Findings:    Frontal, lateral, and coned-down lateral views of the lumbar spine are   provided. There are presumed to be 5 lumbar vertebral bodies, with the   inferior-most visualized disc space being designated as L5-S1 for the   purpose of numbering. No acute fracture identified. Vertebral body heights are   well-maintained. Lordosis is maintained. Grade 2 lytic   spondylolisthesis at L5-S1. Posterior elements are otherwise intact. There is a mild loss of disc height at L5-S1, with the disc heights   otherwise preserved. Included portion of the osseous pelvis is   unremarkable.       Impression   Impression:    1. There appears to be a grade 2 lytic spondylolisthesis at L5-S1 with   associated neuroforaminal narrowing. 2. Otherwise unremarkable. No acute osseous injury is seen. Signed by Dr Jean-Pierre Rojas         Treatments: therapies: RN and SW    Alert, Oriented X 4  Appearance:  Grooming and Hygiene attended to  Speech with Regular Rate and Rhythm  Eye Contact:  Good  No Psychomotor Agitation/Retardation Noted  Attitude:  Cooperative  Mood:  \"I am feeling a lot better. \"  Affective: Congruent, appropriate to the situation, with a normal range and intensity  Thought Processes:  Coherently communicated, logical and goal oriented  Thought Content:  At this time  No Suicidal Ideation, No Homicidal Ideation, No Auditory or Visual  Hallucinations, NO Overt Delusions  Insight:  Present  Judgement:  Normal  Memory is intact for both remote and recent  Intellectual Functioning:  Within the [address] of Knowledge:  Adequate  Attention and Concentration:  Adequate        Discharge Exam:  GAIT STABLE  SPEAKS IN FULL SENTENCES WITHOUT SHORTNESS OF AIR    Disposition: home    Patient Instructions:   Current Discharge Medication List      START taking these medications    Details   DULoxetine (CYMBALTA) 20 MG extended release capsule Take 1 capsule by mouth daily for 14 days  Qty: 14 capsule, Refills: 0      traZODone (DESYREL) 50 MG tablet Take 1 tablet by mouth nightly as needed for Sleep  Qty: 14 tablet, Refills: 0         CONTINUE these medications which have NOT CHANGED    Details   gabapentin (NEURONTIN) 300 MG capsule Take 1 capsule by mouth 3 times daily for 14 days. Kay Ml: 42 capsule, Refills: 0    Associated Diagnoses: Mild episode of recurrent major depressive disorder ([facility] [address]         STOP taking these medications       meloxicam (MOBIC) 15 MG tablet Comments:   Reason for Stopping: PT NO LONGER TAKING THIS MEDICATION        venlafaxine (EFFEXOR XR) 150 MG extended release capsule Comments:   Reason for Stopping: PT NO LONGER TAKING THIS MEDICATION        nicotine (NICODERM CQ) 21 MG/24HR Comments:   Reason for Stopping: PT NO LONGER TAKING THIS MEDICATION        folic acid (FOLVITE) 1 MG tablet Comments:   Reason for Stopping: PT NO LONGER TAKING THIS MEDICATION        vitamin B-12 500 MCG tablet Comments:   Reason for Stopping: PT NO LONGER TAKING THIS MEDICATION        ergocalciferol (ERGOCALCIFEROL) 54530 units capsule Comments:   Reason for Stopping: PT NO LONGER TAKING THIS MEDICATION            Activity: activity as tolerated  Diet: regular diet  Wound Care: none needed    Follow-up with PCP in 2 weeks.     [address] on 9/14/2021 at 1 pm and 9/16/2021 at 11:30 am     [facility] TODAY    Time worked: Less than 30

## 2021-09-10 NOTE — PROGRESS NOTES
CLINICAL PHARMACY NOTE: MEDS TO BEDS    Total # of Prescriptions Filled: 2   The following medications were delivered to the patient:  · Trazodone 50 mg  · Duloxetine 20 mg    Additional Documentation:    Handed scripts to Chito Velasquez) at nurses station

## 2021-09-10 NOTE — PLAN OF CARE
Problem: Suicide risk  Goal: Provide patient with safe environment  Description: Provide patient with safe environment  Outcome: Completed     Problem: Falls - Risk of:  Goal: Will remain free from falls  Description: Will remain free from falls  Outcome: Completed  Goal: Absence of physical injury  Description: Absence of physical injury  Outcome: Completed     Problem: Anger Management/Homicidal Ideation:  Goal: Ability to verbalize frustrations and anger appropriately will improve  Description: Ability to verbalize frustrations and anger appropriately will improve  9/10/2021 1302 by Cinthya Orona RN  Outcome: Completed  9/10/2021 1153 by Marcel Cervantes  Outcome: Ongoing  Goal: Absence of homicidal ideation  Description: Absence of homicidal ideation  Outcome: Completed  Goal: Participates in care planning  Description: Participates in care planning  Outcome: Completed  Goal: Patient specific goal  Description: Patient specific goal  Outcome: Completed     Problem: Depressive Behavior With or Without Suicide Precautions:  Goal: Able to verbalize acceptance of life and situations over which he or she has no control  Description: Able to verbalize acceptance of life and situations over which he or she has no control  9/10/2021 1302 by Cinthya Orona RN  Outcome: Completed  9/10/2021 1055 by Ian Rodriguez  Outcome: Ongoing  Note:                                                                     Group Therapy Note    Date: 9/10/2021  Start Time: 1000  End Time:  1030  Number of Participants: 10    Type of Group: Psychoeducation    Wellness Binder Information  Module Name:  Relapse Prevention  Session Number:  5    Group Goal for Pt: To improve knowledge of relapse prevention strategies    Notes:  Pt demonstrated improved knowledge of relapse prevention strategies by actively participating in group discussion. Status After Intervention:  Unchanged    Participation Level:  Active Listener and Interactive    Participation Quality: Appropriate and Attentive      Speech:  normal      Thought Process/Content: Logical      Affective Functioning: Congruent      Mood: anxious and depressed      Level of consciousness:  Alert and Oriented x4      Response to Learning: Able to verbalize current knowledge/experience, Able to verbalize/acknowledge new learning, and Progressing to goal      Endings: None Reported    Modes of Intervention: Education      Discipline Responsible: Psychoeducational Specialist      Signature:  Jenny Peter    Goal: Able to verbalize and/or display a decrease in depressive symptoms  Description: Able to verbalize and/or display a decrease in depressive symptoms  Outcome: Completed  Goal: Ability to disclose and discuss suicidal ideas will improve  Description: Ability to disclose and discuss suicidal ideas will improve  Outcome: Completed  Goal: Absence of self-harm  Description: Absence of self-harm  Outcome: Completed  Goal: Patient specific goal  Description: Patient specific goal  Outcome: Completed  Goal: Participates in care planning  Description: Participates in care planning  Outcome: Completed     Problem: Anxiety:  Goal: Level of anxiety will decrease  Description: Level of anxiety will decrease  Outcome: Completed     Problem: Pain:  Goal: Pain level will decrease  Description: Pain level will decrease  Outcome: Completed  Goal: Control of acute pain  Description: Control of acute pain  Outcome: Completed  Goal: Control of chronic pain  Description: Control of chronic pain  Outcome: Completed

## 2021-09-11 NOTE — PROGRESS NOTES
SW attempted to contact pt to follow-up with him after he discharged from the unit yesterday to see if he had any questions or concerns that needed to be addressed. SW called the contact number listed for the pt and it went to voicemail. SW left a message letting pt know that if he has any questions to please call us and left the unit's contact number.

## 2021-09-12 ENCOUNTER — HOSPITAL ENCOUNTER (INPATIENT)
Facility: HOSPITAL | Age: 41
LOS: 11 days | Discharge: HOME | End: 2021-09-23
Attending: PSYCHIATRY & NEUROLOGY | Admitting: PSYCHIATRY & NEUROLOGY

## 2021-09-12 DIAGNOSIS — G62.9 NEUROPATHY: Primary | ICD-10-CM

## 2021-09-12 PROBLEM — R45.851 SUICIDAL IDEATION: Status: ACTIVE | Noted: 2021-09-12

## 2021-09-12 RX ORDER — BENZTROPINE MESYLATE 1 MG/ML
0.5 INJECTION INTRAMUSCULAR; INTRAVENOUS DAILY PRN
Status: DISCONTINUED | OUTPATIENT
Start: 2021-09-12 | End: 2021-09-23 | Stop reason: HOSPADM

## 2021-09-12 RX ORDER — GABAPENTIN 300 MG/1
300 CAPSULE ORAL 3 TIMES DAILY
Status: ON HOLD | COMMUNITY
End: 2021-09-23 | Stop reason: SDUPTHER

## 2021-09-12 RX ORDER — TRAZODONE HYDROCHLORIDE 50 MG/1
50 TABLET ORAL NIGHTLY
COMMUNITY
End: 2021-09-23 | Stop reason: HOSPADM

## 2021-09-12 RX ORDER — ALUMINA, MAGNESIA, AND SIMETHICONE 2400; 2400; 240 MG/30ML; MG/30ML; MG/30ML
15 SUSPENSION ORAL EVERY 6 HOURS PRN
Status: DISCONTINUED | OUTPATIENT
Start: 2021-09-12 | End: 2021-09-23 | Stop reason: HOSPADM

## 2021-09-12 RX ORDER — HYDROXYZINE PAMOATE 50 MG/1
50 CAPSULE ORAL EVERY 6 HOURS PRN
Status: DISCONTINUED | OUTPATIENT
Start: 2021-09-12 | End: 2021-09-23 | Stop reason: HOSPADM

## 2021-09-12 RX ORDER — TRAZODONE HYDROCHLORIDE 50 MG/1
50 TABLET ORAL NIGHTLY PRN
Status: DISCONTINUED | OUTPATIENT
Start: 2021-09-12 | End: 2021-09-14

## 2021-09-12 RX ORDER — DULOXETIN HYDROCHLORIDE 20 MG/1
20 CAPSULE, DELAYED RELEASE ORAL DAILY
COMMUNITY
End: 2021-09-23 | Stop reason: HOSPADM

## 2021-09-12 RX ORDER — DULOXETIN HYDROCHLORIDE 20 MG/1
20 CAPSULE, DELAYED RELEASE ORAL DAILY
Status: DISCONTINUED | OUTPATIENT
Start: 2021-09-13 | End: 2021-09-13

## 2021-09-12 RX ORDER — ACETAMINOPHEN 325 MG/1
650 TABLET ORAL EVERY 6 HOURS PRN
Status: DISCONTINUED | OUTPATIENT
Start: 2021-09-12 | End: 2021-09-23 | Stop reason: HOSPADM

## 2021-09-12 RX ORDER — BENZTROPINE MESYLATE 1 MG/1
1 TABLET ORAL DAILY PRN
Status: DISCONTINUED | OUTPATIENT
Start: 2021-09-12 | End: 2021-09-23 | Stop reason: HOSPADM

## 2021-09-12 RX ADMIN — TRAZODONE HYDROCHLORIDE 50 MG: 50 TABLET ORAL at 21:01

## 2021-09-12 NOTE — NURSING NOTE
"Patient admitted to [facility] rm 658 as a direct admit from [facility] ED. Patient left [facility] 9-9-21 AMA due to \"not having suicidal thoughts anymore\". Patient states he is suicidal again with plan to overdose on his medications. Patient reports severe depression and states he has always had anxiety and depression and is \"getting to the point of not being about to control my actions anymore\". EMS was familiar with patient's family and states patient's father killed himself 15 years ago by insulin overdose. He reports patient told him his family \"has given up on him. Patient does not have a history of drug or alcohol abuse. Patient states he is currently homeless but owns a motorcycle.   "

## 2021-09-12 NOTE — PROGRESS NOTES
GURU reviewed.    Request # : [id number]      No findings.     Jesus Mcdermott II, MD  09/12/21 @ 5:53 PM CDT

## 2021-09-13 PROBLEM — M51.36 DDD (DEGENERATIVE DISC DISEASE), LUMBAR: Status: ACTIVE | Noted: 2021-09-13

## 2021-09-13 PROBLEM — F33.2 MDD (MAJOR DEPRESSIVE DISORDER), RECURRENT SEVERE, WITHOUT PSYCHOSIS (HCC): Status: ACTIVE | Noted: 2021-09-13

## 2021-09-13 PROBLEM — G89.4 CHRONIC PAIN SYNDROME: Status: ACTIVE | Noted: 2021-09-13

## 2021-09-13 PROBLEM — G62.9 NEUROPATHY: Status: ACTIVE | Noted: 2021-09-13

## 2021-09-13 LAB — TSH SERPL DL<=0.05 MIU/L-ACNC: 1.04 UIU/ML (ref 0.27–4.2)

## 2021-09-13 PROCEDURE — 84443 ASSAY THYROID STIM HORMONE: CPT | Performed by: PSYCHIATRY & NEUROLOGY

## 2021-09-13 PROCEDURE — 90833 PSYTX W PT W E/M 30 MIN: CPT | Performed by: PSYCHIATRY & NEUROLOGY

## 2021-09-13 PROCEDURE — 99223 1ST HOSP IP/OBS HIGH 75: CPT | Performed by: PSYCHIATRY & NEUROLOGY

## 2021-09-13 RX ORDER — MIRTAZAPINE 15 MG/1
15 TABLET, FILM COATED ORAL NIGHTLY
Status: DISCONTINUED | OUTPATIENT
Start: 2021-09-13 | End: 2021-09-14

## 2021-09-13 RX ORDER — MELATONIN
2000
Status: DISCONTINUED | OUTPATIENT
Start: 2021-09-14 | End: 2021-09-23 | Stop reason: HOSPADM

## 2021-09-13 RX ORDER — NICOTINE 21 MG/24HR
1 PATCH, TRANSDERMAL 24 HOURS TRANSDERMAL
Status: DISCONTINUED | OUTPATIENT
Start: 2021-09-13 | End: 2021-09-23 | Stop reason: HOSPADM

## 2021-09-13 RX ORDER — GABAPENTIN 300 MG/1
300 CAPSULE ORAL 3 TIMES DAILY
Status: DISCONTINUED | OUTPATIENT
Start: 2021-09-13 | End: 2021-09-16

## 2021-09-13 RX ADMIN — NICOTINE 1 PATCH: 21 PATCH, EXTENDED RELEASE TRANSDERMAL at 15:23

## 2021-09-13 RX ADMIN — MIRTAZAPINE 15 MG: 15 TABLET, FILM COATED ORAL at 20:20

## 2021-09-13 RX ADMIN — GABAPENTIN 300 MG: 300 CAPSULE ORAL at 20:21

## 2021-09-13 RX ADMIN — DULOXETINE HYDROCHLORIDE 20 MG: 20 CAPSULE, DELAYED RELEASE ORAL at 08:07

## 2021-09-13 RX ADMIN — GABAPENTIN 300 MG: 300 CAPSULE ORAL at 15:17

## 2021-09-13 NOTE — PROGRESS NOTES
"BRIEF PSYCH NOTE     Notified of pt from [facility] ED. Have reviewed tech, nursing & provider documentation, as available at time of note.      Briefly, 42 y/o M with hx MDD; left AMA from [facility].  SI with plan to OD.         O -  +SI. No signfiicant abnormalities on labwork.  VSS  /80 (BP Location: Right arm, Patient Position: Sitting)   Pulse 81   Temp 97.2 °F (36.2 °C) (Tympanic)   Resp 18   Ht 175.3 cm (69\")   Wt 83.9 kg (185 lb)   SpO2 96%   BMI 27.32 kg/m²          A&P)  --Affective Disorder     --> Admit to U; SI precautions.     --> Discussed with U staff.       Jesus Mcdermott II, MD  09/12/21 @ 22:11 CDT        "

## 2021-09-13 NOTE — NURSING NOTE
"Behavior   Note any precipitants to event or behavior   Describe level and action of any aggressive behavior or speech and associated interventions.     Anxiety: Excess Worry and Restless/Edgy  Depression: depressed mood  Pain  5  AVH   no  S/I   denies current thoughts  Plan  no  H/I   no  Plan  no    Affect   flat      Note:Patient stated that he always has thoughts of suicide but denies current thoughts right now. He stated that he is always able to stop himself and not act on a plan, he stated that it is getting harder to \"stop that part of my brain from wanting to do it\". He has a flat affect and appears guarded and watchful of others. He asked for a trazodone for sleep. Will continue to monitor and provide for safety.      Intervention    PRN medication utilized:  yes - trazodone for sleep    Instructed in medication usage and effects  Medications administered as ordered  Encouraged to verbalize needs      Response    Verbalized understanding   Did patient take medications as ordered yes   Did patient interact with assessment?  yes     Plan    Will monitor for safety  Will monitor every 15 minutes as ordered  Will evaluate and promote the plan of care    Last BM:  unknown date  (Please chart in I/O as well)    "

## 2021-09-13 NOTE — NURSING NOTE
"Behavior   Note any precipitants to event or behavior   Describe level and action of any aggressive behavior or speech and associated interventions.     Anxiety: Excess Worry and Easily fatigued  Depression: depressed mood and suicidal thoughts  Pain  5  AVH   no  S/I   yes   Plan  no  H/I   no  Plan  no    Affect   flat      Note: Pt seen in room. Pt alert. Reports SI, no plan at this time. Does c/o pain with score of \"5\". Pt reports its always there. Denies AVH/HI. Pt with flat affect. Will continue to monitor safety.      Intervention    PRN medication utilized:  no    Instructed in medication usage and effects  Medications administered as ordered  Encouraged to verbalize needs      Response    Verbalized understanding   Did patient take medications as ordered yes   Did patient interact with assessment?  yes     Plan    Will monitor for safety  Will monitor every 15 minutes as ordered  Will evaluate and promote the plan of care    Last BM:  unknown date  (Please chart in I/O as well)    "

## 2021-09-13 NOTE — PLAN OF CARE
Goal Outcome Evaluation:  Plan of Care Reviewed With: patient  Patient Agreement with Plan of Care: agrees     Progress: no change  Outcome Summary: patient has had good results from prn trazodone, he has slept throughout the night

## 2021-09-13 NOTE — CONSULTS
[facility] Medicine Admission      Date of Admission: 9/12/2021      Primary Care Physician: Provider, No Known      Chief Complaint: suicidal ideation     HPI: 41 year old male with chronic pain who is currently admitted to behavioral health unit related to suicidal ideation and major depression.  Hospitalist team is consulted for medical assessment/management.  During visit, patient denies complaints aside from back pain which he notes is chronic related to an L5 vertebral fracture in the past.      Concurrent Medical History: chronic back/nerve pain.    Past Surgical History: lymph node removal    Family History: significant for type 2 DM in patient's father.     Social History: current one pack per day smoker, denies alcohol and illicit drug use.     Allergies: No Known Allergies    Medications:   Prior to Admission medications    Medication Sig Start Date End Date Taking? Authorizing Provider   DULoxetine (CYMBALTA) 20 MG capsule Take 20 mg by mouth Daily.   Yes Romy Rosales MD   gabapentin (NEURONTIN) 300 MG capsule Take 300 mg by mouth 3 (Three) Times a Day.   Yes Romy Rosales MD   traZODone (DESYREL) 25 MG half tablet Take 50 mg by mouth Every Night.   Yes ProviderRomy MD       Review of Systems:  Review of Systems   Constitutional: Negative for chills, fatigue and fever.   HENT: Negative for congestion, rhinorrhea and sore throat.    Respiratory: Negative for chest tightness, shortness of breath and wheezing.    Cardiovascular: Negative for chest pain, palpitations and leg swelling.   Gastrointestinal: Negative for abdominal pain, nausea and vomiting.   Musculoskeletal: Positive for back pain. Negative for neck pain.   Skin: Negative for pallor.   Neurological: Negative for dizziness, weakness and headaches.   Hematological: Does not bruise/bleed easily.   Psychiatric/Behavioral: Negative for confusion. The patient is not  nervous/anxious.             Physical Exam:   Temp:  [97.2 °F (36.2 °C)-99.1 °F (37.3 °C)] 99.1 °F (37.3 °C)  Heart Rate:  [51-81] 51  Resp:  [18-20] 20  BP: (111-121)/(62-80) 111/62  Physical Exam  Vitals and nursing note reviewed.   Constitutional:       General: He is not in acute distress.     Appearance: Normal appearance.   HENT:      Head: Normocephalic and atraumatic.      Right Ear: External ear normal.      Left Ear: External ear normal.      Nose: Nose normal.      Mouth/Throat:      Mouth: Mucous membranes are moist.      Pharynx: Oropharynx is clear.   Eyes:      General: No scleral icterus.        Right eye: No discharge.         Left eye: No discharge.      Conjunctiva/sclera: Conjunctivae normal.   Cardiovascular:      Rate and Rhythm: Normal rate and regular rhythm.      Pulses: Normal pulses.      Heart sounds: Normal heart sounds. No murmur heard.   No friction rub. No gallop.    Pulmonary:      Effort: Pulmonary effort is normal. No respiratory distress.      Breath sounds: Normal breath sounds. No stridor. No wheezing, rhonchi or rales.   Abdominal:      General: Bowel sounds are normal. There is no distension.      Palpations: Abdomen is soft.      Tenderness: There is no abdominal tenderness.   Musculoskeletal:         General: No swelling or deformity. Normal range of motion.      Cervical back: Normal range of motion and neck supple.   Skin:     General: Skin is warm and dry.   Neurological:      General: No focal deficit present.      Mental Status: He is alert and oriented to person, place, and time.   Psychiatric:         Mood and Affect: Mood normal.         Behavior: Behavior normal.     CN I: Sense of smell intact  CN II: Visual fields intact  CN III,IV,VI: extraocular movements intact  CN V: [place] strength and sensation in all three divisions intact  CN VII: Smile and eyelid closure symmetrical  CN VIII: Hearing intact  CN IX and X: Voice and palate movement intact  CN XI: Shoulder  shrug intact  CN XII: Tongue protrusion and movement intact        Results Reviewed:  I have personally reviewed current lab, radiology, and data and agree with results.  Lab Results (last 24 hours)     Procedure Component Value Units Date/Time    TSH [[id number]]  (Normal) Collected: 09/13/21 0529    Specimen: Blood Updated: 09/13/21 0624     TSH 1.040 uIU/mL         Imaging Results (Last 24 Hours)     ** No results found for the last 24 hours. **            Assessment:    Active Hospital Problems    Diagnosis    • Suicidal ideation          Plan:  1. Suicidal ideation: defer management to psychiatry.   2. Tobacco use: continue nicorette gum PRN.   3. Chronic back/neuropathy pain: reports taking Neurontin at home for pain relief.  Will defer to psych for restart.  Neurontin is not seen on GURU report.     Records from [facility] reviewed.  Patient is currently medically stable and hospitalist team can see on as needed basis.  Please call if any needs arise.     I confirmed that the patient's Advance Care Plan is present, code status is documented, or surrogate decision maker is listed in the patient's medical record.      I have utilized all available immediate resources to obtain, update, or review the patient's current medications.          This document has been electronically signed by ALEKSEY Londono on September 13, 2021 12:36 CDT

## 2021-09-13 NOTE — PLAN OF CARE
Goal Outcome Evaluation:      Treatment team met with patient to discuss and review treatment plan. Pt was encouraged to discuss their reason for admission, as well as their goals for treatment. Team discussed anticipated interventions and treatment modalities that will be used to address goals.Pt given opportunity to discuss any concerns re: treatment plan.    Team Members present during meeting:    MD:Dr.Gilley MURRAYN: Avinash  RN: Clair Gutierrez  SW: Lane Lowe  RT:Ama Nixon  Other:  Lexy Ragland Bill, Jonathan, Dane

## 2021-09-13 NOTE — PLAN OF CARE
Goal Outcome Evaluation:  Plan of Care Reviewed With: patient  Patient Agreement with Plan of Care: agrees     Progress: no change  Outcome Summary: Pt continues to endorse SI, no plan. No falls this shift. Isolates in room.
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
## 2021-09-14 LAB
CHOLEST SERPL-MCNC: 166 MG/DL (ref 0–200)
GLUCOSE SERPL-MCNC: 97 MG/DL (ref 65–99)
HDLC SERPL-MCNC: 43 MG/DL (ref 40–60)
LDLC SERPL CALC-MCNC: 103 MG/DL (ref 0–100)
LDLC/HDLC SERPL: 2.35 {RATIO}
TRIGL SERPL-MCNC: 110 MG/DL (ref 0–150)
VLDLC SERPL-MCNC: 20 MG/DL (ref 5–40)
WHOLE BLOOD HOLD SPECIMEN: NORMAL

## 2021-09-14 PROCEDURE — 99232 SBSQ HOSP IP/OBS MODERATE 35: CPT | Performed by: NURSE PRACTITIONER

## 2021-09-14 PROCEDURE — 80061 LIPID PANEL: CPT | Performed by: PSYCHIATRY & NEUROLOGY

## 2021-09-14 PROCEDURE — 82947 ASSAY GLUCOSE BLOOD QUANT: CPT | Performed by: PSYCHIATRY & NEUROLOGY

## 2021-09-14 RX ORDER — TRAZODONE HYDROCHLORIDE 150 MG/1
75 TABLET ORAL NIGHTLY PRN
Status: DISCONTINUED | OUTPATIENT
Start: 2021-09-14 | End: 2021-09-16

## 2021-09-14 RX ORDER — CYCLOBENZAPRINE HCL 5 MG
5 TABLET ORAL 3 TIMES DAILY PRN
Status: DISCONTINUED | OUTPATIENT
Start: 2021-09-14 | End: 2021-09-16

## 2021-09-14 RX ADMIN — GABAPENTIN 300 MG: 300 CAPSULE ORAL at 16:46

## 2021-09-14 RX ADMIN — Medication 2000 UNITS: at 17:09

## 2021-09-14 RX ADMIN — GABAPENTIN 300 MG: 300 CAPSULE ORAL at 20:34

## 2021-09-14 RX ADMIN — CYCLOBENZAPRINE 5 MG: 5 TABLET, FILM COATED ORAL at 21:03

## 2021-09-14 RX ADMIN — NICOTINE 1 PATCH: 21 PATCH, EXTENDED RELEASE TRANSDERMAL at 08:04

## 2021-09-14 RX ADMIN — GABAPENTIN 300 MG: 300 CAPSULE ORAL at 08:03

## 2021-09-14 RX ADMIN — Medication 75 MG: at 21:03

## 2021-09-14 NOTE — PLAN OF CARE
Note: Patient is  41 year old male. Patient was admitted to behavioral health unit from emergency department in [facility]. Patient was previously admitted to [facility] in [facility] (3 days ago), however, patient left against medical advice. Patient presents with suicidal ideations, homicidal ideations, anxiety, and depression. Patient reports that his plan was to overdose on his prescription medications. Patient reports that he has been wanting to hurt others that have caused him harm in the past but did not mention any names. Patient reports that he broke his back in past five years and is working towards "[employer] ([employer] for a physical disability. Patient reports that he is currently homeless (but owns a motorcycle) and has been for past month. Patient reports history of 6 suicide attempts in the past. Patient denies any self-injurious behaviors. Patient denies and visual and auditory hallucinations. Patient was guarded and cooperative during assessment.     Mental Status Exam:   Hygiene:   good  Cooperation:  Guarded  Eye Contact:  Good  Psychomotor Behavior:  Aggitated  Affect:  Angry  Speech:  Normal  Thought Progress:  Goal directed  Thought Content:  Mood congruent  Suicidal:  Suicidal Ideation, Suicidal plan, and Death wish  Homicidal:  HI Homicidal Ideation  Hallucinations:  None  Delusion:  None  Memory:  Intact  Orientation:  Person, Place, Time, and Situation  Reliability:  good  Insight:  Good  Judgement:  Poor  Impulse Control:  Fair     Goals for treatment: Improve mood and reduction of suicidal/homicidal ideations.     Prior Hospitalizations / Dates    Yes, patient was admitted to [facility] in [place], [place]. Patient left against medical advice 3 days ago.    Childhood History: Patient was raised in [facility].     Suicide Attempts: Patient reports that he has 6 prior attempts.    Alcohol: does not drink,  Cannabis: does not  use, Methamphetamine: does not use, Opiate: does not use, Cocaine: does not use, and Synthetic: does not use    Sexual: heterosexual, Marital Status: single, Living situation: homeless, Occupation: unemployed, Activity/nutrition: normal activities of daily living, Tobacco/alcohol/caffeine: no alcohol use and tobacco use: unknown tobacco use, Illicit drugs: quit using street drugs 16 years ago, and Domestic violence: Patient was asked about physical abuse by someone important to them: No    History of physical abuse: no, History of sexual abuse: no, and History of verbal/emotional abuse: no    high school diploma/GED    Access to firearms: Denies     No past medical history on file.     Problem: Adult Behavioral Health Plan of Care  Goal: Adheres to Safety Considerations for Self and Others  Recent Flowsheet Documentation  Taken 9/14/2021 1410 by Glen Molina  Safety Measures:   alarms on   safety plan reviewed   belongings check completed   safety rounds completed   clinical history reviewed   self-directed behavior promoted   environmental rounds completed   suicide assessment completed   safety plan mutually developed   suicide check-in completed  Intervention: Develop and Maintain Individualized Safety Plan  Flowsheets (Taken 9/14/2021 1410)  Safety Measures:   alarms on   safety plan reviewed   belongings check completed   safety rounds completed   clinical history reviewed   self-directed behavior promoted   environmental rounds completed   suicide assessment completed   safety plan mutually developed   suicide check-in completed  Goal: Absence of New-Onset Illness or Injury  Intervention: Identify and Manage Fall Risk  Recent Flowsheet Documentation  Taken 9/14/2021 1410 by Glen Molina  Safety Measures:   alarms on   safety plan reviewed   belongings check completed   safety rounds completed   clinical history reviewed   self-directed behavior promoted   environmental rounds completed   suicide assessment  completed   safety plan mutually developed   suicide check-in completed  Goal: Optimized Coping Skills in Response to Life Stressors  Intervention: Promote Effective Coping Strategies  Flowsheets (Taken 9/14/2021 1410)  Supportive Measures:   active listening utilized   goal setting facilitated   positive reinforcement provided   self-care encouraged   self-reflection promoted   self-responsibility promoted   verbalization of feelings encouraged  Goal: Develops/Participates in Therapeutic Eagan to Support Successful Transition  Intervention: Foster Therapeutic Eagan  Flowsheets (Taken 9/14/2021 1410)  Trust Relationship/Rapport:   empathic listening provided   questions answered   questions encouraged   care explained   choices provided   emotional support provided   reassurance provided   thoughts/feelings acknowledged  Intervention: Mutually Develop Transition Plan  Flowsheets  Taken 9/14/2021 1410  Outpatient/Agency/Support Group Needs:   outpatient counseling   outpatient medication management  Transition Support:   follow-up care discussed   follow-up care coordinated  Current Discharge Risk: homeless  Taken 9/14/2021 1200  Transportation Anticipated: agency  Anticipated Discharge Disposition: homeless shelter  Transportation Concerns: car, none  Concerns to be Addressed:   mental health   suicidal   homicidal  Readmission Within the Last 30 Days: no previous admission in last 30 days  Patient/Family Anticipated Services at Transition: none  Patient/Family Anticipates Transition to: shelter

## 2021-09-14 NOTE — SIGNIFICANT NOTE
09/14/21 1049   OTHER   Discipline physical therapist   Rehab Time/Intention   Session Not Performed patient/family declined evaluation     Initial PT evaluation attempted.  Patient reports his pain and situation is manageable, that only spinal fusion can solve his problem and he is not ready for that.  Skilled PT discharged.  Nurse notified.

## 2021-09-14 NOTE — PLAN OF CARE
Goal Outcome Evaluation:  Plan of Care Reviewed With: patient  Patient Agreement with Plan of Care: agrees     Progress: no change  Outcome Summary: Pt is alert etr cooperative with no requests for PRN's this far.

## 2021-09-14 NOTE — PROGRESS NOTES
"9/14/2021    Chief Complaint: suicidal ideation and depression    Subjective:  Patient is a 41 y.o. male that is currently inpatient on the adult U today he is seen individually. Pt is alert and oriented, he is able to have a meaningful conversation.  Pt states that he did not sleep well and felt \"swimmy\" last night due to Remeron and requests to stop that medication and use trazodone instead. He does request Flexeril as well.   Pt states that he has thoughts of suicide often but will not act upon them. He reports that with his back pain and life circumstances, he finds it difficult to not be negative and to have any hope.  Pt is forward thinking though and is hoping to have surgery to help his back issues and he can go back to work.   Pt reports that being homeless and having to leave belongings in an apartment is challenging.  He reports no one for support at this time.    Pt denies HI/AVH, he does not wish to be on antidepressant.     Objective     Vital Signs         Physical Exam:   General Appearance: alert, appears stated age and cooperative,  Hygiene:   fair  Gait & Station: Limp  Musculoskeletal: No tremors or abnormal involuntary movements    Mental Status Exam:   Cooperation:  Cooperative  Eye Contact:  Fair  Psychomotor Behavior:  Slow  Mood: Sad/Depressed  Affect:  flat  Speech:  Normal  Thought Process:  Appropriately abstract  Associations: Circumstantial  Thought Content:     Mood congruent   Suicidal:  Suicidal Ideation   Homicidal:  None   Hallucinations:  None   Delusion:  None  Cognitive Functioning:   Consciousness: awake, alert and oriented  Reliability:  fair  Insight:  Fair  Judgement:  Fair  Impulse Control:  Fair    Lab Results (last 24 hours)     Procedure Component Value Units Date/Time    Extra Tubes [[id number]] Collected: 09/14/21 0533    Specimen: Blood, Venous Line Updated: 09/14/21 0700    Narrative:      The following orders were created for panel order Extra Tubes.  Procedure     "                           Abnormality         Status                     ---------                               -----------         ------                     Lavender Top[[id number]]                                     Final result                 Please view results for these tests on the individual orders.    Lavbrendon Top [[id number]] Collected: 09/14/21 0533    Specimen: Blood Updated: 09/14/21 0700     Extra Tube hold for add-on     Comment: Auto resulted       Lipid Panel [[id number]]  (Abnormal) Collected: 09/14/21 0533    Specimen: Blood Updated: 09/14/21 0619     Total Cholesterol 166 mg/dL      Triglycerides 110 mg/dL      HDL Cholesterol 43 mg/dL      LDL Cholesterol  103 mg/dL      VLDL Cholesterol 20 mg/dL      LDL/HDL Ratio 2.35    Narrative:      Cholesterol Reference Ranges  (U.S. Department of Health and Human Services ATP III Classifications)    Desirable          <200 mg/dL  Borderline High    200-239 mg/dL  High Risk          >240 mg/dL      Triglyceride Reference Ranges  (U.S. Department of Health and Human Services ATP III Classifications)    Normal           <150 mg/dL  Borderline High  150-199 mg/dL  High             200-499 mg/dL  Very High        >500 mg/dL    HDL Reference Ranges  (U.S. Department of Health and Human Services ATP III Classifcations)    Low     <40 mg/dl (major risk factor for CHD)  High    >60 mg/dl ('negative' risk factor for CHD)        LDL Reference Ranges  (U.S. Department of Health and Human Services ATP III Classifcations)    Optimal          <100 mg/dL  Near Optimal     100-129 mg/dL  Borderline High  130-159 mg/dL  High             160-189 mg/dL  Very High        >189 mg/dL    Glucose, Random [[id number]]  (Normal) Collected: 09/14/21 0533    Specimen: Blood Updated: 09/14/21 0615     Glucose 97 mg/dL         Imaging Results (Last 24 Hours)     ** No results found for the last 24 hours. **          Medicine:   Current Facility-Administered Medications:   •   acetaminophen (TYLENOL) tablet 650 mg, 650 mg, Oral, Q6H PRN, Jesus Mcdermott II, MD  •  aluminum-magnesium hydroxide-simethicone (MAALOX MAX) 400-400-40 MG/5ML suspension 15 mL, 15 mL, Oral, Q6H PRN, Jesus Mcdermott II, MD  •  benztropine (COGENTIN) tablet 1 mg, 1 mg, Oral, Daily PRN **OR** benztropine (COGENTIN) injection 0.5 mg, 0.5 mg, Intramuscular, Daily PRN, Jesus Mcdermott II, MD  •  cholecalciferol (VITAMIN D3) tablet 2,000 Units, 2,000 Units, Oral, Daily With Dinner, Jesus Mcdermott II, MD  •  cyclobenzaprine (FLEXERIL) tablet 5 mg, 5 mg, Oral, TID PRN, Kinjal Cade APRN  •  gabapentin (NEURONTIN) capsule 300 mg, 300 mg, Oral, TID, Jesus Mcdermott II, MD, 300 mg at 09/14/21 0803  •  hydrOXYzine pamoate (VISTARIL) capsule 50 mg, 50 mg, Oral, Q6H PRN, Jesus Mcdermott II, MD  •  magnesium hydroxide (MILK OF MAGNESIA) suspension 2400 mg/10mL 10 mL, 10 mL, Oral, Daily PRN, Jesus Mcdermott II, MD  •  nicotine (NICODERM CQ) 21 MG/24HR patch 1 patch, 1 patch, Transdermal, Q24H, Jesus Mcdermott II, MD, 1 patch at 09/14/21 0804  •  traZODone (DESYREL) tablet 50 mg, 50 mg, Oral, Nightly PRN, Jesus Mcdermott II, MD, 50 mg at 09/12/21 2101    Diagnoses/Assessment:     Suicidal ideation    MDD (major depressive disorder), recurrent severe, without psychosis (CMS/HCC)    Chronic pain syndrome    DDD (degenerative disc disease), lumbar    Neuropathy      Treatment Plan:    1) Will continue care for the patient on the behavioral health unit at [facility] to ensure patient safety.  2) Will continue to provide treatment with the unit milieu, activities, therapies and psychopharmacological management.  3) Patient to be placed on or continued on  Q15 minute checks  and Suicide precautions.  4) Pertinent medical issues:   --Chronic Back Pain: Flexeril PRN, Gabapentin  5) Will order following labs: none  6) Will make the following medication changes:    --Increase Trazodone to 75mg QHS   7) Will continue discharge planning as appropriate for patient.  8) Psychotherapy provided for 16-37 minutes.  Provided supportive therapy and insight-oriented therapy.    Treatment plan and medication risks and benefits discussed with: Patient    ALEKSEY Alejandra  09/14/21  11:25 CDT

## 2021-09-14 NOTE — PLAN OF CARE
Goal Outcome Evaluation:  Plan of Care Reviewed With: patient  Patient Agreement with Plan of Care: agrees     Progress: improving  Outcome Summary: Patient has slept approximately 8 hours thus far during shift. Pt denied SI, HI and AVH.

## 2021-09-14 NOTE — NURSING NOTE
Behavior   Note any precipitants to event or behavior   Describe level and action of any aggressive behavior or speech and associated interventions.     Anxiety: Excess Worry and Easily fatigued  Depression: depressed mood and suicidal thoughts  Pain  5  AVH   no  S/I   no  Plan  no  H/I   no  Plan  no    Affect   flat      Note: Pt seen in common area. Pt alert. Pt denies SI/HI/AVH anxiety and depression. Pt with flat affect. Will continue to monitor safety.      Intervention    PRN medication utilized:  no    Instructed in medication usage and effects  Medications administered as ordered  Encouraged to verbalize needs      Response    Verbalized understanding   Did patient take medications as ordered yes   Did patient interact with assessment?  yes     Plan    Will monitor for safety  Will monitor every 15 minutes as ordered  Will evaluate and promote the plan of care    Last BM:  unknown date  (Please chart in I/O as well)

## 2021-09-14 NOTE — PLAN OF CARE
"  Problem: Adult Behavioral Health Plan of Care  Goal: Optimized Coping Skills in Response to Life Stressors  Outcome: Ongoing, Progressing   Goal Outcome Evaluation:      Met with patient and completed Recreation Therapy assessment.  Pt reports that he is homeless.  He is unemployed and unable to work due to a fracture in his back.  Mobility is impaired due to pain.  Patient reports that he used to be very active and had a lot of leisure interest.  Due to his injury, he is unable to participate in many of his past leisure interest.  He reports that he does not have coping skills and he just tries to \"deal with it\".  Will continue to encourage group participation and identify coping skills and relaxation techniques.           "

## 2021-09-14 NOTE — NURSING NOTE
"Behavior   Note any precipitants to event or behavior   Describe level and action of any aggressive behavior or speech and associated interventions.     Anxiety: Excess Worry and Restless/Edgy  Depression: Patient denies at this time  Pain  0  AVH   no  S/I   yes   Plan  no  H/I   yes   Plan  no    Affect   euthymic/normal      Note: Pt is currently sitting at common area table with other pts talking. He is alert, et pleasant with no complaints. Denies discomfort. Pt says that he is still having SI et says that the \"ability to tell himself no is getting weaker\". He says that he has HI on occasion but doesn't have a plan for either.  He denies having hallucinations.       Intervention    PRN medication utilized:  no    Instructed in medication usage and effects  Medications administered as ordered  Encouraged to verbalize needs      Response    Verbalized understanding   Did patient take medications as ordered yes   Did patient interact with assessment?  yes     Plan    Will monitor for safety  Will monitor every 15 minutes as ordered  Will evaluate and promote the plan of care    Last BM:  unknown date  (Please chart in I/O as well)    "

## 2021-09-15 PROCEDURE — 99232 SBSQ HOSP IP/OBS MODERATE 35: CPT | Performed by: PSYCHIATRY & NEUROLOGY

## 2021-09-15 RX ADMIN — GABAPENTIN 300 MG: 300 CAPSULE ORAL at 16:46

## 2021-09-15 RX ADMIN — Medication 2000 UNITS: at 16:49

## 2021-09-15 RX ADMIN — NICOTINE 1 PATCH: 21 PATCH, EXTENDED RELEASE TRANSDERMAL at 08:31

## 2021-09-15 RX ADMIN — GABAPENTIN 300 MG: 300 CAPSULE ORAL at 08:28

## 2021-09-15 RX ADMIN — GABAPENTIN 300 MG: 300 CAPSULE ORAL at 20:33

## 2021-09-15 RX ADMIN — CYCLOBENZAPRINE 5 MG: 5 TABLET, FILM COATED ORAL at 08:31

## 2021-09-15 RX ADMIN — Medication 75 MG: at 20:49

## 2021-09-15 RX ADMIN — CYCLOBENZAPRINE 5 MG: 5 TABLET, FILM COATED ORAL at 16:52

## 2021-09-15 NOTE — PLAN OF CARE
Goal Outcome Evaluation:  Plan of Care Reviewed With: patient  Patient Agreement with Plan of Care: agrees        Outcome Summary: Slept 6 hours this evening. No behavioral problems.  Problem: Adult Behavioral Health Plan of Care  Goal: Plan of Care Review  Outcome: Ongoing, Progressing  Flowsheets  Taken 9/15/2021 0420 by Lexy Weathers, RN  Outcome Summary: Slept 6 hours this evening. No behavioral problems.  Taken 9/14/2021 2200 by Lexy Weathers, RN  Plan of Care Reviewed With: patient  Patient Agreement with Plan of Care: agrees  Taken 9/14/2021 1400 by Anupama Mcneil, RN  Progress: no change

## 2021-09-15 NOTE — PLAN OF CARE
Goal Outcome Evaluation:  Plan of Care Reviewed With: patient  Patient Agreement with Plan of Care: agrees     Progress: improving  Outcome Summary: Pt continues to endorse depression, SI, no plan. No falls this shift. Denies HI.

## 2021-09-15 NOTE — PROGRESS NOTES
9/15/2021    Chief Complaint: suicidal ideation and depression    Subjective:  Patient is a 41 y.o. male that us currently inpatient on the adult U  Today he is seen in the common area where he is sitting with peers, he is appropriate, alert and oriented.  He continues to deny need for antidepressant medications. He is concerned about discharge plan and returning to [facility].   Pt reports that he did sleep last night with some interruptions.   He does find that flexeril is helpful with pain.   Objective     Vital Signs    Temp:  [97.9 °F (36.6 °C)-98.5 °F (36.9 °C)] 98.5 °F (36.9 °C)  Heart Rate:  [78] 78  Resp:  [16-20] 20  BP: (139-145)/(82-85) 139/82    Physical Exam:   General Appearance: alert, appears stated age and cooperative,  Hygiene:   fair  Gait & Station: Limp  Musculoskeletal: No tremors or abnormal involuntary movements    Mental Status Exam:   Cooperation:  Cooperative  Eye Contact:  Fair  Psychomotor Behavior:  Appropriate  Mood: Improving  Affect:  normal  Speech:  Normal  Thought Process:  Coherent  Associations: Goal Directed and Circumstantial  Thought Content:     Mood congruent   Suicidal:  None   Homicidal:  None   Hallucinations:  None   Delusion:  None  Cognitive Functioning:   Consciousness: awake, alert and oriented  Reliability:  fair  Insight:  Fair  Judgement:  Fair  Impulse Control:  Fair    Lab Results (last 24 hours)     ** No results found for the last 24 hours. **        Imaging Results (Last 24 Hours)     ** No results found for the last 24 hours. **          Medicine:   Current Facility-Administered Medications:   •  acetaminophen (TYLENOL) tablet 650 mg, 650 mg, Oral, Q6H PRN, Jesus Mcdermott II, MD  •  aluminum-magnesium hydroxide-simethicone (MAALOX MAX) 400-400-40 MG/5ML suspension 15 mL, 15 mL, Oral, Q6H PRN, Jesus Mcdermott II, MD  •  benztropine (COGENTIN) tablet 1 mg, 1 mg, Oral, Daily PRN **OR** benztropine (COGENTIN) injection 0.5 mg, 0.5 mg, Intramuscular,  Daily PRN, Jesus Mcdermott II, MD  •  cholecalciferol (VITAMIN D3) tablet 2,000 Units, 2,000 Units, Oral, Daily With Dinner, Jesus Mcdermott II, MD, 2,000 Units at 09/14/21 1709  •  cyclobenzaprine (FLEXERIL) tablet 5 mg, 5 mg, Oral, TID PRN, Kinjal Cade APRN, 5 mg at 09/15/21 0831  •  gabapentin (NEURONTIN) capsule 300 mg, 300 mg, Oral, TID, Jesus Mcdermott II, MD, 300 mg at 09/15/21 0828  •  hydrOXYzine pamoate (VISTARIL) capsule 50 mg, 50 mg, Oral, Q6H PRN, Jesus Mcdermott II, MD  •  magnesium hydroxide (MILK OF MAGNESIA) suspension 2400 mg/10mL 10 mL, 10 mL, Oral, Daily PRN, Jesus Mcdermott II, MD  •  nicotine (NICODERM CQ) 21 MG/24HR patch 1 patch, 1 patch, Transdermal, Q24H, Jesus Mcdermott II, MD, 1 patch at 09/15/21 0831  •  traZODone (DESYREL) half tablet 75 mg, 75 mg, Oral, Nightly PRN, Kinjal Cade APRN, 75 mg at 09/14/21 2103    Diagnoses/Assessment:     Suicidal ideation    MDD (major depressive disorder), recurrent severe, without psychosis (CMS/HCC)    Chronic pain syndrome    DDD (degenerative disc disease), lumbar    Neuropathy      Treatment Plan:    1) Will continue care for the patient on the behavioral health unit at [facility] to ensure patient safety.  2) Will continue to provide treatment with the unit milieu, activities, therapies and psychopharmacological management.  3) Patient to be placed on or continued on  Q15 minute checks  and Suicide precautions.  4) Pertinent medical issues:   --Chronic Pain: Gabapentin, Flexeril.  5) Will order following labs: none  6) Will make the following medication changes:   --Increase Trazodone to 100 mg nightly   7) Will continue discharge planning as appropriate for patient.  8) Psychotherapy provided for less than 16 minutes.    Treatment plan and medication risks and benefits discussed with: Patient    ALEKSEY Alejandra  09/15/21  13:16 CDT

## 2021-09-15 NOTE — NURSING NOTE
Behavior   Note any precipitants to event or behavior   Describe level and action of any aggressive behavior or speech and associated interventions.     Anxiety: Muscle tension  Depression: fatigue  Pain  6  AVH   no  S/I   no  Plan  no  H/I   no  Plan  no    Affect   mood-congruent      Note: Patient appeared calm and appropriate when talking to staff. States his back pain causes him to become depressed with SI. States he is is homeless and unable to work because of chronic back pain. Denies AVH, HI.      Intervention    PRN medication utilized:  yes - flexeril, trazodone    Instructed in medication usage and effects  Medications administered as ordered  Encouraged to verbalize needs      Response    Verbalized understanding   Did patient take medications as ordered yes   Did patient interact with assessment?  yes     Plan    Will monitor for safety  Will monitor every 15 minutes as ordered  Will evaluate and promote the plan of care    Last BM:  unknown date  (Please chart in I/O as well)

## 2021-09-15 NOTE — NURSING NOTE
Behavior   Note any precipitants to event or behavior   Describe level and action of any aggressive behavior or speech and associated interventions.     Anxiety: Patient denies at this time  Depression: depressed mood  Pain  5  AVH   no  S/I   no  Plan  no  H/I   no  Plan  no    Affect   euthymic/normal      Note: Pt seen in common area. Pt continues to endorse SI without plan. Pt reports always have SI but has been unable to control thoughts. Pt reports controlling thoughts are getting better. Medication compliant.       Intervention    PRN medication utilized:  no    Instructed in medication usage and effects  Medications administered as ordered  Encouraged to verbalize needs      Response    Verbalized understanding   Did patient take medications as ordered yes   Did patient interact with assessment?  yes     Plan    Will monitor for safety  Will monitor every 15 minutes as ordered  Will evaluate and promote the plan of care    Last BM:  unknown date  (Please chart in I/O as well)

## 2021-09-16 ENCOUNTER — APPOINTMENT (OUTPATIENT)
Dept: CT IMAGING | Facility: HOSPITAL | Age: 41
End: 2021-09-16

## 2021-09-16 LAB
ALBUMIN SERPL-MCNC: 4.1 G/DL (ref 3.5–5.2)
ALBUMIN/GLOB SERPL: 1.7 G/DL
ALP SERPL-CCNC: 80 U/L (ref 39–117)
ALT SERPL W P-5'-P-CCNC: 32 U/L (ref 1–41)
ANION GAP SERPL CALCULATED.3IONS-SCNC: 7 MMOL/L (ref 5–15)
AST SERPL-CCNC: 18 U/L (ref 1–40)
BASOPHILS # BLD AUTO: 0.03 10*3/MM3 (ref 0–0.2)
BASOPHILS NFR BLD AUTO: 0.3 % (ref 0–1.5)
BILIRUB SERPL-MCNC: 0.2 MG/DL (ref 0–1.2)
BUN SERPL-MCNC: 8 MG/DL (ref 6–20)
BUN/CREAT SERPL: 9.8 (ref 7–25)
CALCIUM SPEC-SCNC: 9.4 MG/DL (ref 8.6–10.5)
CHLORIDE SERPL-SCNC: 99 MMOL/L (ref 98–107)
CO2 SERPL-SCNC: 30 MMOL/L (ref 22–29)
CREAT SERPL-MCNC: 0.82 MG/DL (ref 0.76–1.27)
DEPRECATED RDW RBC AUTO: 43.7 FL (ref 37–54)
EOSINOPHIL # BLD AUTO: 0.26 10*3/MM3 (ref 0–0.4)
EOSINOPHIL NFR BLD AUTO: 2.8 % (ref 0.3–6.2)
ERYTHROCYTE [DISTWIDTH] IN BLOOD BY AUTOMATED COUNT: 12.7 % (ref 12.3–15.4)
GFR SERPL CREATININE-BSD FRML MDRD: 104 ML/MIN/1.73
GLOBULIN UR ELPH-MCNC: 2.4 GM/DL
GLUCOSE SERPL-MCNC: 95 MG/DL (ref 65–99)
HCT VFR BLD AUTO: 46.6 % (ref 37.5–51)
HGB BLD-MCNC: 15.3 G/DL (ref 13–17.7)
IMM GRANULOCYTES # BLD AUTO: 0.05 10*3/MM3 (ref 0–0.05)
IMM GRANULOCYTES NFR BLD AUTO: 0.5 % (ref 0–0.5)
LYMPHOCYTES # BLD AUTO: 1.29 10*3/MM3 (ref 0.7–3.1)
LYMPHOCYTES NFR BLD AUTO: 14.1 % (ref 19.6–45.3)
MCH RBC QN AUTO: 31 PG (ref 26.6–33)
MCHC RBC AUTO-ENTMCNC: 32.8 G/DL (ref 31.5–35.7)
MCV RBC AUTO: 94.3 FL (ref 79–97)
MONOCYTES # BLD AUTO: 1.11 10*3/MM3 (ref 0.1–0.9)
MONOCYTES NFR BLD AUTO: 12.1 % (ref 5–12)
NEUTROPHILS NFR BLD AUTO: 6.43 10*3/MM3 (ref 1.7–7)
NEUTROPHILS NFR BLD AUTO: 70.2 % (ref 42.7–76)
NRBC BLD AUTO-RTO: 0 /100 WBC (ref 0–0.2)
PLATELET # BLD AUTO: 264 10*3/MM3 (ref 140–450)
PMV BLD AUTO: 11 FL (ref 6–12)
POTASSIUM SERPL-SCNC: 4.6 MMOL/L (ref 3.5–5.2)
PROT SERPL-MCNC: 6.5 G/DL (ref 6–8.5)
RBC # BLD AUTO: 4.94 10*6/MM3 (ref 4.14–5.8)
SODIUM SERPL-SCNC: 136 MMOL/L (ref 136–145)
WBC # BLD AUTO: 9.17 10*3/MM3 (ref 3.4–10.8)

## 2021-09-16 PROCEDURE — 93010 ELECTROCARDIOGRAM REPORT: CPT | Performed by: INTERNAL MEDICINE

## 2021-09-16 PROCEDURE — 90833 PSYTX W PT W E/M 30 MIN: CPT | Performed by: PSYCHIATRY & NEUROLOGY

## 2021-09-16 PROCEDURE — 80053 COMPREHEN METABOLIC PANEL: CPT | Performed by: PSYCHIATRY & NEUROLOGY

## 2021-09-16 PROCEDURE — 85025 COMPLETE CBC W/AUTO DIFF WBC: CPT | Performed by: PSYCHIATRY & NEUROLOGY

## 2021-09-16 PROCEDURE — 93005 ELECTROCARDIOGRAM TRACING: CPT | Performed by: PSYCHIATRY & NEUROLOGY

## 2021-09-16 PROCEDURE — 70450 CT HEAD/BRAIN W/O DYE: CPT

## 2021-09-16 PROCEDURE — 99233 SBSQ HOSP IP/OBS HIGH 50: CPT | Performed by: PSYCHIATRY & NEUROLOGY

## 2021-09-16 RX ORDER — TRAZODONE HYDROCHLORIDE 100 MG/1
100 TABLET ORAL NIGHTLY
Status: DISCONTINUED | OUTPATIENT
Start: 2021-09-16 | End: 2021-09-23 | Stop reason: HOSPADM

## 2021-09-16 RX ORDER — VENLAFAXINE HYDROCHLORIDE 75 MG/1
75 CAPSULE, EXTENDED RELEASE ORAL
Status: DISCONTINUED | OUTPATIENT
Start: 2021-09-18 | End: 2021-09-19

## 2021-09-16 RX ORDER — CYCLOBENZAPRINE HCL 5 MG
5 TABLET ORAL EVERY 8 HOURS SCHEDULED
Status: DISCONTINUED | OUTPATIENT
Start: 2021-09-16 | End: 2021-09-23 | Stop reason: HOSPADM

## 2021-09-16 RX ORDER — TRAZODONE HYDROCHLORIDE 50 MG/1
25 TABLET ORAL NIGHTLY PRN
Status: DISCONTINUED | OUTPATIENT
Start: 2021-09-16 | End: 2021-09-17

## 2021-09-16 RX ORDER — GABAPENTIN 300 MG/1
300 CAPSULE ORAL 3 TIMES DAILY
Status: DISCONTINUED | OUTPATIENT
Start: 2021-09-16 | End: 2021-09-23 | Stop reason: HOSPADM

## 2021-09-16 RX ORDER — VENLAFAXINE HYDROCHLORIDE 37.5 MG/1
37.5 CAPSULE, EXTENDED RELEASE ORAL
Status: COMPLETED | OUTPATIENT
Start: 2021-09-16 | End: 2021-09-17

## 2021-09-16 RX ORDER — KETAMINE HCL IN NACL, ISO-OSM 100MG/10ML
0.4 SYRINGE (ML) INJECTION ONCE
Status: DISCONTINUED | OUTPATIENT
Start: 2021-09-17 | End: 2021-09-16

## 2021-09-16 RX ADMIN — TRAZODONE HYDROCHLORIDE 100 MG: 100 TABLET ORAL at 20:19

## 2021-09-16 RX ADMIN — CYCLOBENZAPRINE 5 MG: 5 TABLET, FILM COATED ORAL at 20:19

## 2021-09-16 RX ADMIN — CYCLOBENZAPRINE 5 MG: 5 TABLET, FILM COATED ORAL at 08:05

## 2021-09-16 RX ADMIN — GABAPENTIN 300 MG: 300 CAPSULE ORAL at 08:03

## 2021-09-16 RX ADMIN — GABAPENTIN 300 MG: 300 CAPSULE ORAL at 20:19

## 2021-09-16 RX ADMIN — VENLAFAXINE HYDROCHLORIDE 37.5 MG: 37.5 CAPSULE, EXTENDED RELEASE ORAL at 18:22

## 2021-09-16 RX ADMIN — GABAPENTIN 300 MG: 300 CAPSULE ORAL at 13:51

## 2021-09-16 RX ADMIN — Medication 2000 UNITS: at 18:22

## 2021-09-16 RX ADMIN — CYCLOBENZAPRINE 5 MG: 5 TABLET, FILM COATED ORAL at 15:14

## 2021-09-16 RX ADMIN — NICOTINE 1 PATCH: 21 PATCH, EXTENDED RELEASE TRANSDERMAL at 08:03

## 2021-09-16 NOTE — NURSING NOTE
"Behavior   Note any precipitants to event or behavior   Describe level and action of any aggressive behavior or speech and associated interventions.     Anxiety: Patient denies at this time  Depression: depressed mood  Pain  5  AVH   no  S/I   yes   Plan  no  H/I   no  Plan  no    Affect   euthymic/normal      Note: Patient interviewed in common area. States he slept well throughout the night. Patient continues to endorse SI without plan- states \"It is something I always think about. It's just at times I'm able to control it and sometimes I'm not.\" Patient requested PRN flexeril and it was administered. Compliant with medication administration and assessment.    Intervention    PRN medication utilized:  yes - flexeril    Instructed in medication usage and effects  Medications administered as ordered  Encouraged to verbalize needs      Response    Verbalized understanding   Did patient take medications as ordered yes   Did patient interact with assessment?  yes     Plan    Will monitor for safety  Will monitor every 15 minutes as ordered  Will evaluate and promote the plan of care    Last BM:  unknown date  (Please chart in I/O as well)    "

## 2021-09-16 NOTE — PROGRESS NOTES
9/16/2021    Chief Complaint: suicidal ideation and depression    Subjective:  Patient is a 41 y.o. male that is currently inpatient on the adult U today he is seen in the [facility] area.  Pt is alert and oriented, he is able to have meaningful conversation.  Pt reports that he did sleep well last night.  He also requests that his gabapentin times be adjusted, which have been.   Pt continues to deny need for anti depressants, although states he thinks of suicide most days due to situation, but he has no plan to act on it.    Pt is eating well, he is attending groups, he interacts well with others.   Pt denies SI (at this time)/HI/AVH     Objective     Vital Signs    Temp:  [97.1 °F (36.2 °C)-99.1 °F (37.3 °C)] 97.1 °F (36.2 °C)  Heart Rate:  [76-86] 76  Resp:  [16-18] 16  BP: ()/(62-73) 96/62    Physical Exam:   General Appearance: alert, appears stated age and cooperative,  Hygiene:   fair  Gait & Station: Normal  Musculoskeletal: No tremors or abnormal involuntary movements    Mental Status Exam:   Cooperation:  Cooperative  Eye Contact:  Fair  Psychomotor Behavior:  Appropriate  Mood: Improving  Affect:  mood-congruent  Speech:  Normal  Thought Process:  Coherent  Associations: Goal Directed  Thought Content:     Mood congruent   Suicidal:  None   Homicidal:  None   Hallucinations:  None   Delusion:  None  Cognitive Functioning:   Consciousness: awake, alert and oriented  Reliability:  fair  Insight:  Fair  Judgement:  Fair  Impulse Control:  Fair    Lab Results (last 24 hours)     ** No results found for the last 24 hours. **        Imaging Results (Last 24 Hours)     ** No results found for the last 24 hours. **          Medicine:   Current Facility-Administered Medications:   •  acetaminophen (TYLENOL) tablet 650 mg, 650 mg, Oral, Q6H PRN, Jesus Mcdermott II, MD  •  aluminum-magnesium hydroxide-simethicone (MAALOX MAX) 400-400-40 MG/5ML suspension 15 mL, 15 mL, Oral, Q6H PRN, Jesus Mcdermott  MD FIORDALIZA  •  benztropine (COGENTIN) tablet 1 mg, 1 mg, Oral, Daily PRN **OR** benztropine (COGENTIN) injection 0.5 mg, 0.5 mg, Intramuscular, Daily PRN, Jesus Mcdermott II, MD  •  cholecalciferol (VITAMIN D3) tablet 2,000 Units, 2,000 Units, Oral, Daily With Dinner, Jesus Mcdermott II, MD, 2,000 Units at 09/15/21 1649  •  cyclobenzaprine (FLEXERIL) tablet 5 mg, 5 mg, Oral, TID PRN, Kinjal Cade, APRN, 5 mg at 09/16/21 0805  •  gabapentin (NEURONTIN) capsule 300 mg, 300 mg, Oral, TID, Jesus Mcdermott II, MD  •  hydrOXYzine pamoate (VISTARIL) capsule 50 mg, 50 mg, Oral, Q6H PRN, Jesus Mcdermott II, MD  •  magnesium hydroxide (MILK OF MAGNESIA) suspension 2400 mg/10mL 10 mL, 10 mL, Oral, Daily PRN, Jesus Mcdermott II, MD  •  nicotine (NICODERM CQ) 21 MG/24HR patch 1 patch, 1 patch, Transdermal, Q24H, Jesus Mcdermott II, MD, 1 patch at 09/16/21 0803  •  traZODone (DESYREL) half tablet 75 mg, 75 mg, Oral, Nightly PRN, Brigette Cadedith E, APRN, 75 mg at 09/15/21 2049    Diagnoses/Assessment:     Suicidal ideation    MDD (major depressive disorder), recurrent severe, without psychosis (CMS/HCC)    Chronic pain syndrome    DDD (degenerative disc disease), lumbar    Neuropathy      Treatment Plan:    1) Will continue care for the patient on the behavioral health unit at [facility] to ensure patient safety.  2) Will continue to provide treatment with the unit milieu, activities, therapies and psychopharmacological management.  3) Patient to be placed on or continued on  Q15 minute checks  and Suicide and Fall precautions.  4) Pertinent medical issues:   --Chronic Pain: Neurontin and Flexeril   5) Will order following labs: none  6) Will make the following medication changes:   --Cont Neurontin 300 mg TID  --Cont Trazodone 75 mg nightly   7) Will continue discharge planning as appropriate for patient.  8) Psychotherapy provided for less than 16 minutes.    Treatment plan and  medication risks and benefits discussed with: Patient    ALEKSEY Alejandra  09/16/21  10:36 CDT

## 2021-09-16 NOTE — PLAN OF CARE
Goal Outcome Evaluation:  Plan of Care Reviewed With: patient  Patient Agreement with Plan of Care: agrees     Progress: improving  Outcome Summary: Patient has slept approximately 5.5 hours thus far during shift and is currently still sleeping. Pt denied SI, HI and AVH.

## 2021-09-16 NOTE — NURSING NOTE
Behavior   Note any precipitants to event or behavior   Describe level and action of any aggressive behavior or speech and associated interventions.     Anxiety: Patient denies at this time  Depression: depressed mood  Pain  0  AVH   no  S/I   no  Plan  no  H/I   no  Plan  no    Affect   euthymic/normal      Note: Patient sitting in dining area interacting with peers. Patient denies SI, HI and AVH at this time. Will continue to monitor and provide a safe environment.      Intervention    PRN medication utilized:  no    Instructed in medication usage and effects  Medications administered as ordered  Encouraged to verbalize needs      Response    Verbalized understanding   Did patient take medications as ordered yes   Did patient interact with assessment?  yes     Plan    Will monitor for safety  Will monitor every 15 minutes as ordered  Will evaluate and promote the plan of care    Last BM:    (Please chart in I/O as well)

## 2021-09-16 NOTE — PLAN OF CARE
Goal Outcome Evaluation:  Plan of Care Reviewed With: patient  Patient Agreement with Plan of Care: agrees     Progress: improving  Outcome Summary: pt has been participating in groups and interacting [facility] peers throughout the day, pt states he always has suicidal thoughts but no plans to act on them, CT of the head done today

## 2021-09-17 PROCEDURE — 90833 PSYTX W PT W E/M 30 MIN: CPT | Performed by: PSYCHIATRY & NEUROLOGY

## 2021-09-17 PROCEDURE — 99233 SBSQ HOSP IP/OBS HIGH 50: CPT | Performed by: PSYCHIATRY & NEUROLOGY

## 2021-09-17 RX ORDER — ONDANSETRON 2 MG/ML
4 INJECTION INTRAMUSCULAR; INTRAVENOUS ONCE AS NEEDED
Status: DISCONTINUED | OUTPATIENT
Start: 2021-09-17 | End: 2021-09-19

## 2021-09-17 RX ORDER — SODIUM CHLORIDE 9 MG/ML
INJECTION, SOLUTION INTRAVENOUS
Status: COMPLETED
Start: 2021-09-17 | End: 2021-09-17

## 2021-09-17 RX ORDER — TRAZODONE HYDROCHLORIDE 50 MG/1
25 TABLET ORAL NIGHTLY PRN
Status: DISCONTINUED | OUTPATIENT
Start: 2021-09-17 | End: 2021-09-23 | Stop reason: HOSPADM

## 2021-09-17 RX ORDER — LABETALOL HYDROCHLORIDE 5 MG/ML
10 INJECTION, SOLUTION INTRAVENOUS
Status: DISCONTINUED | OUTPATIENT
Start: 2021-09-17 | End: 2021-09-19

## 2021-09-17 RX ADMIN — GABAPENTIN 300 MG: 300 CAPSULE ORAL at 20:36

## 2021-09-17 RX ADMIN — Medication 2000 UNITS: at 17:18

## 2021-09-17 RX ADMIN — CYCLOBENZAPRINE 5 MG: 5 TABLET, FILM COATED ORAL at 20:36

## 2021-09-17 RX ADMIN — VENLAFAXINE HYDROCHLORIDE 37.5 MG: 37.5 CAPSULE, EXTENDED RELEASE ORAL at 12:18

## 2021-09-17 RX ADMIN — GABAPENTIN 300 MG: 300 CAPSULE ORAL at 15:57

## 2021-09-17 RX ADMIN — CYCLOBENZAPRINE 5 MG: 5 TABLET, FILM COATED ORAL at 17:18

## 2021-09-17 RX ADMIN — KETAMINE HYDROCHLORIDE 33.6 MG: 10 INJECTION INTRAMUSCULAR; INTRAVENOUS at 11:50

## 2021-09-17 RX ADMIN — TRAZODONE HYDROCHLORIDE 100 MG: 100 TABLET ORAL at 20:36

## 2021-09-17 RX ADMIN — NICOTINE 1 PATCH: 21 PATCH, EXTENDED RELEASE TRANSDERMAL at 12:22

## 2021-09-17 RX ADMIN — SODIUM CHLORIDE 1000 ML: 9 INJECTION, SOLUTION INTRAVENOUS at 11:55

## 2021-09-17 RX ADMIN — GABAPENTIN 300 MG: 300 CAPSULE ORAL at 12:18

## 2021-09-17 RX ADMIN — CYCLOBENZAPRINE 5 MG: 5 TABLET, FILM COATED ORAL at 12:18

## 2021-09-17 NOTE — PROGRESS NOTES
Psychiatry Progress Note    9/17/2021    Legal Status: Voluntary    Chief Complaint: suicidal ideation    Subjective:  Patient is a 41 y.o. male who was hospitalized for suicidal ideation.    Patient describes his life stressors in a matter of fact manner.  He notes that he has been evicted and his only real possession is a motorcycle that is currently at the [facility].  He notes no family or friends that he can stay with.  He notes need for shelter.    Patient discusses his reluctance with medications.  He notes antidepressants seem to work for about 2 weeks then they stop working.  He notes that he has taken them for longer.    Discussed patter of mood episodes.  He denies any s/s of marisela.  He describes baseline mild dysthymia with periods of depression that can be severe and last for a couple of weeks.  He notes episodes about once a year.    He notes history of substance use about 16 yrs ago.  He notes preference for hallucinogens and THC.  He notes that he had tried ketamine at that time.  He notes that he does not like the way he feels from opiates and they don't help his neuropathic pain.  He prefers that neurontin that he is on.    Patient notes minimal anxiety these days but notes past issues with anxiety.  He notes anger from feeling slighted can lead him to think of gruesome things to do to the other person but notes that he does not act on these feelings.  He denies significant trauma and does not believe he has PTSD.    Objective     Vital Signs    Vitals:    09/16/21 1918 09/17/21 0700 09/17/21 1142 09/17/21 1152   BP: 139/87 112/67 135/83 124/89   BP Location: Right arm Right arm Left arm Left arm   Patient Position: Sitting Sitting Sitting Sitting   Pulse: 106 68 69 72   Resp: 18 18 16 18   Temp: 97.9 °F (36.6 °C) 97.6 °F (36.4 °C)     TempSrc: Tympanic Tympanic     SpO2: 97% 97% 97% 98%   Weight:       Height:           Physical Exam:   General Appearance: alert, appears stated age  and cooperative,  Hygiene:   good  Gait & Station: Limp  Musculoskeletal: No tremors or abnormal involuntary movements    Mental Status Exam:   Cooperation:  Cooperative  Eye Contact:  Good  Psychomotor Behavior:  Appropriate  Mood: Sad/Depressed  Affect:  constricted  Speech:  Normal  Thought Process:  Coherent  Associations: Goal Directed  Thought Content:     Mood congruent   Suicidal:  Suicidal Ideation   Homicidal:  None   Hallucinations:  None   Delusion:  None  Cognitive Functioning:   Consciousness: awake, alert and oriented  Reliability:  appears open and genuine  Insight:  Fair  Judgement:  Fair  Impulse Control:  Fair    Lab Results: Results source: EMR   Lab Results (last 24 hours)     ** No results found for the last 24 hours. **          Radiology Results:  Imaging Results (Last 24 Hours)     ** No results found for the last 24 hours. **          Medicine:   Current Facility-Administered Medications:   •  acetaminophen (TYLENOL) tablet 650 mg, 650 mg, Oral, Q6H PRN, Jesus Mcdermott II, MD  •  aluminum-magnesium hydroxide-simethicone (MAALOX MAX) 400-400-40 MG/5ML suspension 15 mL, 15 mL, Oral, Q6H PRN, Jesus Mcdermott II, MD  •  benztropine (COGENTIN) tablet 1 mg, 1 mg, Oral, Daily PRN **OR** benztropine (COGENTIN) injection 0.5 mg, 0.5 mg, Intramuscular, Daily PRN, Jesus Mcdermott II, MD  •  cholecalciferol (VITAMIN D3) tablet 2,000 Units, 2,000 Units, Oral, Daily With Dinner, Jesus Mcdermott II, MD, 2,000 Units at 09/16/21 1822  •  cyclobenzaprine (FLEXERIL) tablet 5 mg, 5 mg, Oral, Q8H, Kinjal Cade APRN, 5 mg at 09/16/21 2019  •  gabapentin (NEURONTIN) capsule 300 mg, 300 mg, Oral, TID, Jesus Mcdermott II, MD, 300 mg at 09/16/21 2019  •  hydrOXYzine pamoate (VISTARIL) capsule 50 mg, 50 mg, Oral, Q6H PRN, Jesus Mcdermott II, MD  •  ketamine (KETALAR) 33.6 mg in sodium chloride 0.9 % 100 mL infusion, 0.4 mg/kg, Intravenous, Once, Jesus Mcdermott II, MD,  33.6 mg at 09/17/21 1150  •  labetalol (NORMODYNE,TRANDATE) injection 10 mg, 10 mg, Intravenous, Q10 Min PRN, Rachelle Mason MD  •  magnesium hydroxide (MILK OF MAGNESIA) suspension 2400 mg/10mL 10 mL, 10 mL, Oral, Daily PRN, Jesus Mcdermott II, MD  •  nicotine (NICODERM CQ) 21 MG/24HR patch 1 patch, 1 patch, Transdermal, Q24H, Jesus Mcdermott II, MD, 1 patch at 09/16/21 0803  •  ondansetron (ZOFRAN) injection 4 mg, 4 mg, Intravenous, Once PRN, Rachelle Mason MD  •  traZODone (DESYREL) half tablet 25 mg, 25 mg, Oral, Nightly PRN, Jesus Mcdermott II, MD  •  traZODone (DESYREL) tablet 100 mg, 100 mg, Oral, Nightly, Jesus Mcdermott II, MD, 100 mg at 09/16/21 2019  •  venlafaxine XR (EFFEXOR-XR) 24 hr capsule 37.5 mg, 37.5 mg, Oral, Daily With Breakfast, 37.5 mg at 09/16/21 1822 **FOLLOWED BY** [START ON 9/18/2021] venlafaxine XR (EFFEXOR-XR) 24 hr capsule 75 mg, 75 mg, Oral, Daily With Breakfast, Jesus Mcdermott II, MD    Diagnoses/Assessment:     Suicidal ideation    MDD (major depressive disorder), recurrent severe, without psychosis (CMS/HCC)    Chronic pain syndrome    DDD (degenerative disc disease), lumbar    Neuropathy    Impression: Severe depression with suicidality requiring use of medication with restricted use and requiring close monitoring.    Treatment Plan:    1) Will continue care for the patient on the behavioral health unit at [facility] to ensure patient safety.  2) Will continue to provide treatment with the unit milieu, activities, therapies and psychopharmacological management.  3) Patient to be placed on or continued on  Q15 minute checks  and Suicide and Fall precautions.  4) Pertinent medical issues:   --Chronic Pain: Neurontin and Flexeril  5) Will order following labs: none  6) Will make the following medication changes:   --Continue trazodone 100mg qhs.  --Increase Effexor to 75mg daily tomorrow morning.  7) Will continue discharge  planning as appropriate for patient.    Treatment plan and medication risks and benefits discussed with: Patient     Psychotherapy Note  --Total Psychotherapy Time: 20 minutes  --Participants: Patient, myself  --Focus of Therapeutic Encounter: depression  --Intervention Type: Supportive and Psycho-Educational  --Therapy notes: I provided reflective listening, supportive therapy, reflection, and allowed them to express affect in therapy course.  Helped to build rapport and worked to help him gain understanding of medication management and need to consider augmentation of the effexor.  Discussed ketamine and answered questions.  --DX: as above  --Plan: Continue to work on developing & strengthening coping skills; correcting maladaptive schema  --Post therapy status: improving affect    Rachelle Mason MD  09/17/21 at 12:00 CDT

## 2021-09-17 NOTE — NURSING NOTE
Behavior   Note any precipitants to event or behavior   Describe level and action of any aggressive behavior or speech and associated interventions.     Anxiety: Patient denies at this time  Depression: Patient denies at this time  Pain  0  AVH   no  S/I   no  Plan  no  H/I   no  Plan  no    Affect   euthymic/normal      Note: Patient sitting in dining area interacting with peers. Patient denies SI, HI and AVH at this time. Will continue to monitor and provide a safe environment.      Intervention    PRN medication utilized:  no    Instructed in medication usage and effects  Medications administered as ordered  Encouraged to verbalize needs      Response    Verbalized understanding   Did patient take medications as ordered yes   Did patient interact with assessment?  yes     Plan    Will monitor for safety  Will monitor every 15 minutes as ordered  Will evaluate and promote the plan of care    Last BM:    (Please chart in I/O as well)

## 2021-09-17 NOTE — PLAN OF CARE
Goal Outcome Evaluation:  Plan of Care Reviewed With: patient        Progress: no change  Outcome Summary: Initial assessment.  Intake 100% - 2x, 0% - 2x plus 100% - 2x, 0% - 1x for snacks.  Maintain pt on prescribed diet.  Encourge intake.

## 2021-09-17 NOTE — PLAN OF CARE
Goal Outcome Evaluation:  Plan of Care Reviewed With: patient  Patient Agreement with Plan of Care: agrees     Progress: improving  Outcome Summary: Patient has slept approximately 5.5 hours thus far during shift. Patient denied SI, HI and AVH.

## 2021-09-17 NOTE — NURSING NOTE
Infusion completed patient tolerated well, IV to right ac discontinued at this time, will continue to monitor.

## 2021-09-17 NOTE — CONSULTS
Adult Nutrition  Assessment    Patient Name:  Mo Latham  YOB: 1980  MRN: [id number]  Admit Date:  9/12/2021    Assessment Date:  9/17/2021    Comments:  Pt with dx suicidal ideations, major depressive disorder - severe - without psychosis, chronic pain syndrome, degnerative disk disese, neuropathy.  Pt reports good appetite.  Voiced no food preferences.  Intake 100% - 2x, 0% - 1x plus 100% - 2x, 0% - 1x for snacks.  Pt reported to eat well though some meals have to be held due to tx pt receives.  Pt denies GI distress.  Meds and labs reviewed.  Will maintain pt on prescribed diet and and make recommendations as appropriate.      Reason for Assessment     Row Name 09/17/21 1600          Reason for Assessment    Reason For Assessment  per organizational policy Length of Stay             Labs/Tests/Procedures/Meds     Row Name 09/17/21 1601          Labs/Procedures/Meds    Lab Results Reviewed  reviewed        Medications    Pertinent Medications Reviewed  reviewed           Estimated/Assessed Needs     Row Name 09/17/21 1602          Calculation Measurements    Weight Used For Calculations  83.9 kg (184 lb 15.5 oz)        Estimated/Assessed Needs    Additional Documentation  Calorie Requirements (Group);Fluid Requirements (Group);McDowell-St. Jeor Equation (Group);Protein Requirements (Group)        Calorie Requirements    Estimated Calorie Requirement (kcal/day)  2254     Estimated Calorie Need Method  McDowell-St Jeor        McDowell-St. Jeor Equation    RMR (McDowell-St. Jeor Equation)  1734.375        Protein Requirements    Weight Used For Protein Calculations  83.9 kg (184 lb 15.5 oz)     Est Protein Requirement Amount (gms/kg)  1.0 gm protein     Estimated Protein Requirements (gms/day)  83.9        Fluid Requirements    Fluid Requirements (mL/day)  2517     RDA Method (mL)  2517         Nutrition Prescription Ordered     Row Name 09/17/21 1603          Nutrition Prescription PO    Current PO  Diet  Regular     Other Modifiers  Safe Tray         Evaluation of Received Nutrient/Fluid Intake     Row Name 09/17/21 1604          PO Evaluation    Number of Meals  3 plus 3 snacks     % PO Intake  100% - 2x, 0% - 1x plus 100% - 2x, 0% - 1x for snacks               Electronically signed by:  Shayna Mota RD  09/17/21 16:05 CDT

## 2021-09-17 NOTE — NURSING NOTE
Behavior   Note any precipitants to event or behavior   Describe level and action of any aggressive behavior or speech and associated interventions.     Anxiety: Patient denies at this time  Depression: Patient denies at this time  Pain  7  AVH   no  S/I   yes  Plan  no  H/I   no  Plan  no    Affect   euthymic/normal      Note:Patient has been sitting out on hallway interacting with other patient, indorses SI but no plan. He is able to make needs known, has asked many questions reguarding ketamine infusion, education provided and encouraged him to ask Dr Mason with any other concerns. 20 gauge IV to right AC intact, clean,dry intact, no redness, no drainage noted.       Intervention    PRN medication utilized:  no    Instructed in medication usage and effects  Medications administered as ordered  Encouraged to verbalize needs      Response    Verbalized understanding   Did patient take medications as ordered pt is NPO for ketamine   Did patient interact with assessment?  yes     Plan    Will monitor for safety  Will monitor every 15 minutes as ordered  Will evaluate and promote the plan of care    Last BM:  unknown date  (Please chart in I/O as well)

## 2021-09-17 NOTE — PLAN OF CARE
"Goal Outcome Evaluation:  Plan of Care Reviewed With: patient  Patient Agreement with Plan of Care: agrees     Progress: no change  Outcome Summary: Patient has had ketamine infusion today patient told this RN that he felt nothing \"I've taken more than this out on the streets\", he has been medication compliant. Interacts with staff and peers in appropriate manner, attends group.  "

## 2021-09-18 PROCEDURE — 99232 SBSQ HOSP IP/OBS MODERATE 35: CPT | Performed by: PSYCHIATRY & NEUROLOGY

## 2021-09-18 RX ORDER — OLANZAPINE 2.5 MG/1
2.5 TABLET ORAL NIGHTLY
Status: DISCONTINUED | OUTPATIENT
Start: 2021-09-18 | End: 2021-09-19

## 2021-09-18 RX ADMIN — CYCLOBENZAPRINE 5 MG: 5 TABLET, FILM COATED ORAL at 08:20

## 2021-09-18 RX ADMIN — CYCLOBENZAPRINE 5 MG: 5 TABLET, FILM COATED ORAL at 16:30

## 2021-09-18 RX ADMIN — GABAPENTIN 300 MG: 300 CAPSULE ORAL at 08:20

## 2021-09-18 RX ADMIN — OLANZAPINE 2.5 MG: 2.5 TABLET, FILM COATED ORAL at 20:19

## 2021-09-18 RX ADMIN — CYCLOBENZAPRINE 5 MG: 5 TABLET, FILM COATED ORAL at 20:18

## 2021-09-18 RX ADMIN — GABAPENTIN 300 MG: 300 CAPSULE ORAL at 20:19

## 2021-09-18 RX ADMIN — GABAPENTIN 300 MG: 300 CAPSULE ORAL at 14:11

## 2021-09-18 RX ADMIN — VENLAFAXINE HYDROCHLORIDE 75 MG: 75 CAPSULE, EXTENDED RELEASE ORAL at 08:19

## 2021-09-18 RX ADMIN — Medication 2000 UNITS: at 18:08

## 2021-09-18 RX ADMIN — TRAZODONE HYDROCHLORIDE 100 MG: 100 TABLET ORAL at 20:18

## 2021-09-18 RX ADMIN — NICOTINE 1 PATCH: 21 PATCH, EXTENDED RELEASE TRANSDERMAL at 12:03

## 2021-09-18 NOTE — PROGRESS NOTES
Psychiatry Progress Note    9/18/2021    Legal Status: Voluntary    Chief Complaint: suicidal ideation    Subjective:  Patient is a 41 y.o. male who was hospitalized for suicidal ideation.    Patient notes some improvement in mood with the ketamine.  He notes continued anxiety about medication.  He does after some discussion agree to start zyprexa low dose for augmentation for effexor.  He did not want to increase the effexor as well tomorrow.    He continues to believe there is no family or friends who will be there to help.  He continues to be worried about his shelter situation when he leaves.  He is also concerned about his motorcycle that is currently at the [facility].    He notes suicidal thinking continues to linger in the background when life is stressful as it is now with his homelessness.    Objective     Vital Signs    Vitals:    09/17/21 1242 09/17/21 1252 09/17/21 2100 09/18/21 0700   BP: 141/84 121/58 126/79 119/69   BP Location: Left arm Left arm Right arm Right arm   Patient Position: Sitting Sitting Sitting Lying   Pulse: 72 64 82 67   Resp: 16 14 18 18   Temp:   97 °F (36.1 °C) 97.1 °F (36.2 °C)   TempSrc:   Tympanic Tympanic   SpO2: 97% 98% 96% 97%   Weight:       Height:           Physical Exam:   General Appearance: alert, appears stated age and cooperative,  Hygiene:   good  Gait & Station: Limp  Musculoskeletal: No tremors or abnormal involuntary movements    Mental Status Exam:   Cooperation:  Cooperative  Eye Contact:  Good  Psychomotor Behavior:  Appropriate  Mood: Improving  Affect:  constricted  Speech:  Normal  Thought Process:  Coherent  Associations: Goal Directed  Thought Content:     Mood congruent   Suicidal:  Suicidal Ideation vaguely in the background   Homicidal:  None   Hallucinations:  None   Delusion:  None  Cognitive Functioning:   Consciousness: awake, alert and oriented  Reliability:  appears open and genuine  Insight:  Fair  Judgement:  Fair  Impulse  Control:  Fair    Lab Results: Results source: EMR   Lab Results (last 24 hours)     ** No results found for the last 24 hours. **          Radiology Results:  Imaging Results (Last 24 Hours)     ** No results found for the last 24 hours. **          Medicine:   Current Facility-Administered Medications:   •  acetaminophen (TYLENOL) tablet 650 mg, 650 mg, Oral, Q6H PRN, Jesus Mcdermott II, MD  •  aluminum-magnesium hydroxide-simethicone (MAALOX MAX) 400-400-40 MG/5ML suspension 15 mL, 15 mL, Oral, Q6H PRN, Jesus Mcdermott II, MD  •  benztropine (COGENTIN) tablet 1 mg, 1 mg, Oral, Daily PRN **OR** benztropine (COGENTIN) injection 0.5 mg, 0.5 mg, Intramuscular, Daily PRN, Jesus Mcdermott II, MD  •  cholecalciferol (VITAMIN D3) tablet 2,000 Units, 2,000 Units, Oral, Daily With Dinner, Jesus Mcdermott II, MD, 2,000 Units at 09/17/21 1718  •  cyclobenzaprine (FLEXERIL) tablet 5 mg, 5 mg, Oral, Q8H, Kinjal Cade APRN, 5 mg at 09/18/21 0820  •  gabapentin (NEURONTIN) capsule 300 mg, 300 mg, Oral, TID, Jesus Mcdermott II, MD, 300 mg at 09/18/21 1411  •  hydrOXYzine pamoate (VISTARIL) capsule 50 mg, 50 mg, Oral, Q6H PRN, Jesus Mcdermott II, MD  •  labetalol (NORMODYNE,TRANDATE) injection 10 mg, 10 mg, Intravenous, Q10 Min PRN, Rachelle Mason MD  •  magnesium hydroxide (MILK OF MAGNESIA) suspension 2400 mg/10mL 10 mL, 10 mL, Oral, Daily PRN, Jesus Mcdermott II, MD  •  nicotine (NICODERM CQ) 21 MG/24HR patch 1 patch, 1 patch, Transdermal, Q24H, Jesus Mcdermott II, MD, 1 patch at 09/18/21 1203  •  ondansetron (ZOFRAN) injection 4 mg, 4 mg, Intravenous, Once PRN, Rachelle Mason MD  •  traZODone (DESYREL) tablet 100 mg, 100 mg, Oral, Nightly, Jesus Mcdermott II, MD, 100 mg at 09/17/21 2036  •  traZODone (DESYREL) tablet 25 mg, 25 mg, Oral, Nightly PRN, Jesus Mcdermott II, MD  •  [COMPLETED] venlafaxine XR (EFFEXOR-XR) 24 hr capsule 37.5 mg, 37.5 mg, Oral,  Daily With Breakfast, 37.5 mg at 09/17/21 1218 **FOLLOWED BY** venlafaxine XR (EFFEXOR-XR) 24 hr capsule 75 mg, 75 mg, Oral, Daily With Breakfast, Jesus Mcdermott II, MD, 75 mg at 09/18/21 0819    Diagnoses/Assessment:     Suicidal ideation    MDD (major depressive disorder), recurrent severe, without psychosis (CMS/HCC)    Chronic pain syndrome    DDD (degenerative disc disease), lumbar    Neuropathy    Treatment Plan:    1) Will continue care for the patient on the behavioral health unit at [facility] to ensure patient safety.  2) Will continue to provide treatment with the unit milieu, activities, therapies and psychopharmacological management.  3) Patient to be placed on or continued on  Q15 minute checks  and Suicide and Fall precautions.  4) Pertinent medical issues:   --Chronic Pain: Neurontin and Flexeril  5) Will order following labs: none  6) Will make the following medication changes:   --Continue trazodone 100mg qhs.  --Continue Effexor 75mg daily  --Start zyprexa 2.5mg qhs  7) Will continue discharge planning as appropriate for patient.    Treatment plan and medication risks and benefits discussed with: Patient     Rachelle Mason MD  09/18/21 at 15:42 CDT

## 2021-09-18 NOTE — PLAN OF CARE
Goal Outcome Evaluation:  Plan of Care Reviewed With: patient  Patient Agreement with Plan of Care: agrees     Progress: improving  Outcome Summary: pt has been in common area laughing with peers throughout the day, he is very pleasant and cooperative, no issues at this time

## 2021-09-18 NOTE — NURSING NOTE
Behavior   Note any precipitants to event or behavior   Describe level and action of any aggressive behavior or speech and associated interventions.     Anxiety: Patient denies at this time  Depression: depressed mood  Pain  0  AVH   no  S/I   no  Plan  no  H/I   no  Plan  no    Affect   flat      Note:Patient has been laughing and interacting with peers tonight.  He stated that he is depressed about his living situation and back pain.  He is up walking around and taking water to the other patients.  He talks at length about his injuries.  He denies SI/HI and hallucinations.        Intervention    PRN medication utilized:  no    Instructed in medication usage and effects  Medications administered as ordered  Encouraged to verbalize needs      Response    Verbalized understanding   Did patient take medications as ordered yes   Did patient interact with assessment?  yes     Plan    Will monitor for safety  Will monitor every 15 minutes as ordered  Will evaluate and promote the plan of care    Last BM:  unknown date  (Please chart in I/O as well)

## 2021-09-18 NOTE — NURSING NOTE
"Behavior   Note any precipitants to event or behavior   Describe level and action of any aggressive behavior or speech and associated interventions.     Anxiety: Patient denies at this time  Depression: depressed mood  Pain  0  AVH   no  S/I   no  Plan  no  H/I   no  Plan  no    Affect   euthymic/normal      Note: Patient interviewed in common area. He has been laughing and interacting with peers this morning. Patient states that suicide is something he always thinks about but that there are times where he can't control it. When asked about the ketamine infusion he received yesterday pt states \"I've taken more ketamine off the streets. That dose is nothing.\" Compliant with medication administration and assessment.     Intervention    PRN medication utilized:  no    Instructed in medication usage and effects  Medications administered as ordered  Encouraged to verbalize needs      Response    Verbalized understanding   Did patient take medications as ordered yes   Did patient interact with assessment?  yes     Plan    Will monitor for safety  Will monitor every 15 minutes as ordered  Will evaluate and promote the plan of care    Last BM:  unknown date  (Please chart in I/O as well)    "

## 2021-09-18 NOTE — PLAN OF CARE
Goal Outcome Evaluation:  Plan of Care Reviewed With: patient  Patient Agreement with Plan of Care: agrees     Progress: no change  Outcome Summary: Patient has been calm and cooperative tonight, sleeping approximately 6 1/2 hours tonight.  Been calm and cooperative

## 2021-09-19 PROCEDURE — 99232 SBSQ HOSP IP/OBS MODERATE 35: CPT | Performed by: PSYCHIATRY & NEUROLOGY

## 2021-09-19 RX ORDER — KETAMINE HCL IN NACL, ISO-OSM 100MG/10ML
0.5 SYRINGE (ML) INJECTION ONCE
Status: DISCONTINUED | OUTPATIENT
Start: 2021-09-19 | End: 2021-09-19

## 2021-09-19 RX ORDER — ONDANSETRON 2 MG/ML
4 INJECTION INTRAMUSCULAR; INTRAVENOUS ONCE AS NEEDED
Status: DISCONTINUED | OUTPATIENT
Start: 2021-09-20 | End: 2021-09-23 | Stop reason: HOSPADM

## 2021-09-19 RX ORDER — KETAMINE HCL IN NACL, ISO-OSM 100MG/10ML
0.5 SYRINGE (ML) INJECTION ONCE
Status: DISCONTINUED | OUTPATIENT
Start: 2021-09-20 | End: 2021-09-19

## 2021-09-19 RX ORDER — VENLAFAXINE HYDROCHLORIDE 75 MG/1
150 CAPSULE, EXTENDED RELEASE ORAL
Status: DISCONTINUED | OUTPATIENT
Start: 2021-09-20 | End: 2021-09-23 | Stop reason: HOSPADM

## 2021-09-19 RX ORDER — LABETALOL HYDROCHLORIDE 5 MG/ML
10 INJECTION, SOLUTION INTRAVENOUS
Status: DISCONTINUED | OUTPATIENT
Start: 2021-09-20 | End: 2021-09-23 | Stop reason: HOSPADM

## 2021-09-19 RX ORDER — OLANZAPINE 5 MG/1
5 TABLET ORAL NIGHTLY
Status: DISCONTINUED | OUTPATIENT
Start: 2021-09-19 | End: 2021-09-23 | Stop reason: HOSPADM

## 2021-09-19 RX ADMIN — OLANZAPINE 5 MG: 5 TABLET, FILM COATED ORAL at 20:36

## 2021-09-19 RX ADMIN — CYCLOBENZAPRINE 5 MG: 5 TABLET, FILM COATED ORAL at 16:19

## 2021-09-19 RX ADMIN — TRAZODONE HYDROCHLORIDE 100 MG: 100 TABLET ORAL at 20:36

## 2021-09-19 RX ADMIN — GABAPENTIN 300 MG: 300 CAPSULE ORAL at 20:36

## 2021-09-19 RX ADMIN — CYCLOBENZAPRINE 5 MG: 5 TABLET, FILM COATED ORAL at 08:08

## 2021-09-19 RX ADMIN — Medication 2000 UNITS: at 18:27

## 2021-09-19 RX ADMIN — NICOTINE 1 PATCH: 21 PATCH, EXTENDED RELEASE TRANSDERMAL at 08:09

## 2021-09-19 RX ADMIN — VENLAFAXINE HYDROCHLORIDE 75 MG: 75 CAPSULE, EXTENDED RELEASE ORAL at 08:08

## 2021-09-19 RX ADMIN — GABAPENTIN 300 MG: 300 CAPSULE ORAL at 14:07

## 2021-09-19 RX ADMIN — GABAPENTIN 300 MG: 300 CAPSULE ORAL at 08:08

## 2021-09-19 RX ADMIN — CYCLOBENZAPRINE 5 MG: 5 TABLET, FILM COATED ORAL at 20:36

## 2021-09-19 NOTE — NURSING NOTE
Behavior   Note any precipitants to event or behavior   Describe level and action of any aggressive behavior or speech and associated interventions.     Anxiety: Patient denies at this time  Depression: Patient denies at this time  Pain  0  AVH   no  S/I   no  Plan  no  H/I   no  Plan  no    Affect   euthymic/normal      Note:Patient denies SI/HI and hallucinations.  He stated that at times the thought of suicide enters his mind but it quickly leaves because he can push it away.  He is laughing and interacting with peers.  He talks at length about being homeless and worried about where he will go.      Intervention    PRN medication utilized:  no    Instructed in medication usage and effects  Medications administered as ordered  Encouraged to verbalize needs      Response    Verbalized understanding   Did patient take medications as ordered yes   Did patient interact with assessment?  yes     Plan    Will monitor for safety  Will monitor every 15 minutes as ordered  Will evaluate and promote the plan of care    Last BM:  unknown date  (Please chart in I/O as well)

## 2021-09-19 NOTE — PLAN OF CARE
Goal Outcome Evaluation:  Plan of Care Reviewed With: patient  Patient Agreement with Plan of Care: agrees     Progress: improving  Outcome Summary: Patient has slept for 6 1/2 hours and has denied Si/Hi tonight

## 2021-09-19 NOTE — NURSING NOTE
Behavior   Note any precipitants to event or behavior   Describe level and action of any aggressive behavior or speech and associated interventions.     Anxiety: Patient denies at this time  Depression: Patient denies at this time  Pain  0  AVH   no  S/I   no  Plan  no  H/I   no  Plan  no    Affect   euthymic/normal      Note: Patient interviewed in common area. Denies SI/HI and hallucinations.  He stated that at times the thought of suicide enters his mind but it quickly leaves because he can push it away.  He is laughing, interacting, and helping peers with meal trays. Compliant with medication administration and assessment. Very pleasant with staff and peers.        Intervention    PRN medication utilized:  no    Instructed in medication usage and effects  Medications administered as ordered  Encouraged to verbalize needs      Response    Verbalized understanding   Did patient take medications as ordered yes   Did patient interact with assessment?  yes     Plan    Will monitor for safety  Will monitor every 15 minutes as ordered  Will evaluate and promote the plan of care    Last BM:  unknown date  (Please chart in I/O as well)

## 2021-09-19 NOTE — PLAN OF CARE
Goal Outcome Evaluation:  Plan of Care Reviewed With: patient  Patient Agreement with Plan of Care: agrees     Progress: improving  Outcome Summary: patient has been laughing and interacting with peers in common area throughout the day, compliant with medication administration, no issues at this time

## 2021-09-19 NOTE — PROGRESS NOTES
"9/19/2021    Chief Complaint: suicidal ideation and depression    Subjective:  Patient is a 41 y.o. male that is currently inpatient on the adult BHU today he is seen in the [facility] area where he is engaged with peers. Pt states that although he carries on with peers and laughs, he still has severe depression and thoughts of suicide that come and go constantly.  Pt states that he realizes he can come off \"ok\" but, when he is alone in thought, he is down.   Pt is hopeful for Ketamine to help improve thoughts of suicide.    Pt is compliant with medications, reports that he slept \"pretty good\" through the night.   Pt denies HI/AVH, states SI is not severe at this present time.     Objective     Vital Signs    Temp:  [98 °F (36.7 °C)-98.2 °F (36.8 °C)] 98 °F (36.7 °C)  Heart Rate:  [70-84] 70  Resp:  [18] 18  BP: (125-136)/(83-86) 136/86    Physical Exam:   General Appearance: alert, appears stated age and cooperative,  Hygiene:   fair  Gait & Station: Normal  Musculoskeletal: No tremors or abnormal involuntary movements    Mental Status Exam:   Cooperation:  Cooperative  Eye Contact:  Fair  Psychomotor Behavior:  Appropriate  Mood: Improving  Affect:  normal  Speech:  Normal  Thought Process:  Coherent  Associations: Circumstantial  Thought Content:     Mood congruent   Suicidal:  Suicidal Ideation comes and goes often through the day   Homicidal:  None   Hallucinations:  None   Delusion:  None  Cognitive Functioning:   Consciousness: awake, alert and oriented  Reliability:  fair  Insight:  Fair  Judgement:  Fair  Impulse Control:  Fair    Lab Results (last 24 hours)     ** No results found for the last 24 hours. **        Imaging Results (Last 24 Hours)     ** No results found for the last 24 hours. **          Medicine:   Current Facility-Administered Medications:   •  acetaminophen (TYLENOL) tablet 650 mg, 650 mg, Oral, Q6H PRN, Jesus Mcdermott II, MD  •  aluminum-magnesium hydroxide-simethicone (MAALOX MAX) " 400-400-40 MG/5ML suspension 15 mL, 15 mL, Oral, Q6H PRN, Jesus Mcdermott II, MD  •  benztropine (COGENTIN) tablet 1 mg, 1 mg, Oral, Daily PRN **OR** benztropine (COGENTIN) injection 0.5 mg, 0.5 mg, Intramuscular, Daily PRN, Jesus Mcdermott II, MD  •  cholecalciferol (VITAMIN D3) tablet 2,000 Units, 2,000 Units, Oral, Daily With Dinner, Jesus Mcdermott II, MD, 2,000 Units at 09/18/21 1808  •  cyclobenzaprine (FLEXERIL) tablet 5 mg, 5 mg, Oral, Q8H, Kinjal Cade APRN, 5 mg at 09/19/21 0808  •  gabapentin (NEURONTIN) capsule 300 mg, 300 mg, Oral, TID, Jesus Mcdermott II, MD, 300 mg at 09/19/21 0808  •  hydrOXYzine pamoate (VISTARIL) capsule 50 mg, 50 mg, Oral, Q6H PRN, Jesus Mcdermott II, MD  •  labetalol (NORMODYNE,TRANDATE) injection 10 mg, 10 mg, Intravenous, Q10 Min PRN, Rachelle Mason MD  •  magnesium hydroxide (MILK OF MAGNESIA) suspension 2400 mg/10mL 10 mL, 10 mL, Oral, Daily PRN, Jesus Mcdermott II, MD  •  nicotine (NICODERM CQ) 21 MG/24HR patch 1 patch, 1 patch, Transdermal, Q24H, Jesus Mcdermott II, MD, 1 patch at 09/19/21 0809  •  OLANZapine (zyPREXA) tablet 2.5 mg, 2.5 mg, Oral, Nightly, Rachelle Mason MD, 2.5 mg at 09/18/21 2019  •  ondansetron (ZOFRAN) injection 4 mg, 4 mg, Intravenous, Once PRN, Rachelle Mason MD  •  traZODone (DESYREL) tablet 100 mg, 100 mg, Oral, Nightly, Jesus Mcdermott II, MD, 100 mg at 09/18/21 2018  •  traZODone (DESYREL) tablet 25 mg, 25 mg, Oral, Nightly PRN, Jesus Mcdermott II, MD  •  [COMPLETED] venlafaxine XR (EFFEXOR-XR) 24 hr capsule 37.5 mg, 37.5 mg, Oral, Daily With Breakfast, 37.5 mg at 09/17/21 1218 **FOLLOWED BY** venlafaxine XR (EFFEXOR-XR) 24 hr capsule 75 mg, 75 mg, Oral, Daily With Breakfast, Jesus Mcdermott II, MD, 75 mg at 09/19/21 0808    Diagnoses/Assessment:     Suicidal ideation    MDD (major depressive disorder), recurrent severe, without psychosis (CMS/HCC)    Chronic pain  syndrome    DDD (degenerative disc disease), lumbar    Neuropathy      Treatment Plan:    1) Will continue care for the patient on the behavioral health unit at [facility] to ensure patient safety.  2) Will continue to provide treatment with the unit milieu, activities, therapies and psychopharmacological management.  3) Patient to be placed on or continued on  Q15 minute checks  and Suicide precautions.  4) Pertinent medical issues:   --Chronic Pain: Neurontin and Flexeril  5) Will order following labs: none  6) Will make the following medication changes:   --Cont Trazodone 100 mg QHS  --Increase Effexor to 150 mg daily  --Increase Zyprexa to 5 mg nightly   7) Will continue discharge planning as appropriate for patient.  8) Psychotherapy provided for less than 16 minutes.    Treatment plan and medication risks and benefits discussed with: Patient    ALEKSEY Alejandra  09/19/21  13:14 CDT

## 2021-09-20 PROCEDURE — 99232 SBSQ HOSP IP/OBS MODERATE 35: CPT | Performed by: PSYCHIATRY & NEUROLOGY

## 2021-09-20 RX ORDER — SODIUM CHLORIDE 9 MG/ML
INJECTION, SOLUTION INTRAVENOUS
Status: DISPENSED
Start: 2021-09-20 | End: 2021-09-20

## 2021-09-20 RX ADMIN — NICOTINE 1 PATCH: 21 PATCH, EXTENDED RELEASE TRANSDERMAL at 08:12

## 2021-09-20 RX ADMIN — KETAMINE HYDROCHLORIDE 42 MG: 10 INJECTION INTRAMUSCULAR; INTRAVENOUS at 10:38

## 2021-09-20 RX ADMIN — TRAZODONE HYDROCHLORIDE 100 MG: 100 TABLET ORAL at 20:25

## 2021-09-20 RX ADMIN — Medication 2000 UNITS: at 16:12

## 2021-09-20 RX ADMIN — CYCLOBENZAPRINE 5 MG: 5 TABLET, FILM COATED ORAL at 20:25

## 2021-09-20 RX ADMIN — GABAPENTIN 300 MG: 300 CAPSULE ORAL at 08:11

## 2021-09-20 RX ADMIN — CYCLOBENZAPRINE 5 MG: 5 TABLET, FILM COATED ORAL at 16:12

## 2021-09-20 RX ADMIN — CYCLOBENZAPRINE 5 MG: 5 TABLET, FILM COATED ORAL at 08:11

## 2021-09-20 RX ADMIN — GABAPENTIN 300 MG: 300 CAPSULE ORAL at 20:24

## 2021-09-20 RX ADMIN — OLANZAPINE 5 MG: 5 TABLET, FILM COATED ORAL at 20:25

## 2021-09-20 RX ADMIN — VENLAFAXINE HYDROCHLORIDE 150 MG: 75 CAPSULE, EXTENDED RELEASE ORAL at 08:11

## 2021-09-20 RX ADMIN — LABETALOL HYDROCHLORIDE 10 MG: 5 INJECTION, SOLUTION INTRAVENOUS at 11:46

## 2021-09-20 RX ADMIN — GABAPENTIN 300 MG: 300 CAPSULE ORAL at 13:51

## 2021-09-20 NOTE — NURSING NOTE
Patients blood pressure stable. IV dc'd- catheter intact. Patient alert and oriented. No signs and symptoms of distress.

## 2021-09-20 NOTE — PLAN OF CARE
Goal Outcome Evaluation:  Plan of Care Reviewed With: patient  Patient Agreement with Plan of Care: agrees     Progress: improving  Outcome Summary: Patient has been laughing with others throughout the night.  He has been med compliant.  He stated however that he is depressed.  He has been med compliant.  He has slept approximately 5 hours so far tonight.

## 2021-09-20 NOTE — NURSING NOTE
Infusion complete. Blood pressure elevated. Give PRN labetolol per Dr. Mason. Patient tolerated well.

## 2021-09-20 NOTE — NURSING NOTE
Ketamine infusion started. Patient has been NPO since 0000. Baseline set of vitals obtained. Susi CARVAJAL and Dr. Mason at bedside.

## 2021-09-20 NOTE — PLAN OF CARE
Goal Outcome Evaluation:  Plan of Care Reviewed With: patient  Patient Agreement with Plan of Care: agrees     Progress: improving  Outcome Summary: patient has been laughing and interacting with peers in common area throughout the day, compliant with medication administration, patient revceived ketamine infusion today- reports slight mood improvement, no issues at this time

## 2021-09-20 NOTE — PROGRESS NOTES
Psychiatry Progress Note    9/20/2021    Legal Status: Voluntary    Chief Complaint: suicidal ideation    Subjective:  Patient is a 41 y.o. male who was hospitalized for suicidal ideation.    Patient notes improvement in mood with the ketamine.  He has been tolerating the effexor increase and zyprexa increase.  He denies suicidal thoughts today but notes he has always struggled with these thoughts when he is under situational stress.  He is concerned about rebuilding his life from homeless.    Objective     Vital Signs    Vitals:    09/20/21 0700 09/20/21 1038 09/20/21 1044 09/20/21 1054   BP: 124/74 127/87 129/90 148/98   BP Location: Right arm Left arm Left arm Left arm   Patient Position: Lying Lying Lying Lying   Pulse: 60 74 72 80   Resp: 18 18 18 18   Temp: 99.1 °F (37.3 °C) 98.1 °F (36.7 °C)     TempSrc: Tympanic Tympanic     SpO2: 96% 99% 98% 99%   Weight:       Height:           Physical Exam:   General Appearance: alert, appears stated age and cooperative,  Hygiene:   good  Gait & Station: Limp  Musculoskeletal: No tremors or abnormal involuntary movements    Mental Status Exam:   Cooperation:  Cooperative  Eye Contact:  Good  Psychomotor Behavior:  Appropriate  Mood: Improving  Affect:  constricted  Speech:  Normal  Thought Process:  Coherent  Associations: Goal Directed  Thought Content:     Mood congruent   Suicidal:  He notes no active suicidal thoughts today but notes easily trigged by situational issues.    Homicidal:  None   Hallucinations:  None   Delusion:  None  Cognitive Functioning:   Consciousness: awake, alert and oriented  Reliability:  appears open and genuine  Insight:  Fair  Judgement:  Fair  Impulse Control:  Fair    Lab Results: Results source: EMR   Lab Results (last 24 hours)     ** No results found for the last 24 hours. **          Radiology Results:  Imaging Results (Last 24 Hours)     ** No results found for the last 24 hours. **          Medicine:   Current Facility-Administered  Medications:   •  acetaminophen (TYLENOL) tablet 650 mg, 650 mg, Oral, Q6H PRN, Jesus Mcdermott II, MD  •  aluminum-magnesium hydroxide-simethicone (MAALOX MAX) 400-400-40 MG/5ML suspension 15 mL, 15 mL, Oral, Q6H PRN, Jesus Mcdermott II, MD  •  benztropine (COGENTIN) tablet 1 mg, 1 mg, Oral, Daily PRN **OR** benztropine (COGENTIN) injection 0.5 mg, 0.5 mg, Intramuscular, Daily PRN, Jesus Mcdermott II, MD  •  cholecalciferol (VITAMIN D3) tablet 2,000 Units, 2,000 Units, Oral, Daily With Dinner, Jesus Mcdermott II, MD, 2,000 Units at 09/19/21 1827  •  cyclobenzaprine (FLEXERIL) tablet 5 mg, 5 mg, Oral, Q8H, Kinjal Cade APRN, 5 mg at 09/20/21 0811  •  gabapentin (NEURONTIN) capsule 300 mg, 300 mg, Oral, TID, Jesus Mcdermott II, MD, 300 mg at 09/20/21 0811  •  hydrOXYzine pamoate (VISTARIL) capsule 50 mg, 50 mg, Oral, Q6H PRN, Jesus Mcdermott II, MD  •  ketamine (KETALAR) 42 mg in sodium chloride 0.9 % 100 mL infusion, 0.5 mg/kg, Intravenous, Once, Rachelle Mason MD  •  labetalol (NORMODYNE,TRANDATE) injection 10 mg, 10 mg, Intravenous, Q10 Min PRN, Rachelle Mason MD  •  magnesium hydroxide (MILK OF MAGNESIA) suspension 2400 mg/10mL 10 mL, 10 mL, Oral, Daily PRN, Jesus Mcdermott II, MD  •  nicotine (NICODERM CQ) 21 MG/24HR patch 1 patch, 1 patch, Transdermal, Q24H, Jesus Mcdermott II, MD, 1 patch at 09/20/21 0812  •  OLANZapine (zyPREXA) tablet 5 mg, 5 mg, Oral, Nightly, Kinjal Cade APRN, 5 mg at 09/19/21 2036  •  ondansetron (ZOFRAN) injection 4 mg, 4 mg, Intravenous, Once PRN, Rachelle Mason MD  •  traZODone (DESYREL) tablet 100 mg, 100 mg, Oral, Nightly, Jesus Mcdermott II, MD, 100 mg at 09/19/21 2036  •  traZODone (DESYREL) tablet 25 mg, 25 mg, Oral, Nightly PRN, Jesus Mcdermott II, MD  •  [COMPLETED] venlafaxine XR (EFFEXOR-XR) 24 hr capsule 37.5 mg, 37.5 mg, Oral, Daily With Breakfast, 37.5 mg at 09/17/21 1218 **FOLLOWED BY**  venlafaxine XR (EFFEXOR-XR) 24 hr capsule 150 mg, 150 mg, Oral, Daily With Breakfast, Kinjal Cade APRN, 150 mg at 09/20/21 0811    Diagnoses/Assessment:     Suicidal ideation    MDD (major depressive disorder), recurrent severe, without psychosis (CMS/HCC)    Chronic pain syndrome    DDD (degenerative disc disease), lumbar    Neuropathy    Treatment Plan:    1) Will continue care for the patient on the behavioral health unit at [facility] to ensure patient safety.  2) Will continue to provide treatment with the unit milieu, activities, therapies and psychopharmacological management.  3) Patient to be placed on or continued on  Q15 minute checks  and Suicide and Fall precautions.  4) Pertinent medical issues:   --Chronic Pain: Neurontin and Flexeril  5) Will order following labs: none  6) Will make the following medication changes:   --Cont Trazodone 100 mg QHS  --Cont Effexor 150 mg daily  --Cont Zyprexa 5 mg nightly   --Cont Neurontin was not increased and patient did not have any concerns so will cont 300mg tid for now.  --Ketamine infusion next on Wednesday at 0.5mg/kg.  7) Will continue discharge planning as appropriate for patient.    Treatment plan and medication risks and benefits discussed with: Patient     Rachelle Mason MD  09/20/21 at 10:55 CDT

## 2021-09-20 NOTE — NURSING NOTE
Behavior   Note any precipitants to event or behavior   Describe level and action of any aggressive behavior or speech and associated interventions.     Anxiety: Patient denies at this time  Depression: depressed mood  Pain  0  AVH   no  S/I   no  Plan  no  H/I   no  Plan  no    Affect   normal      Note:Patient has been calm and cooperative tonight.  He denies SI/HI and hallucinations.  He is laughing and talking with peers.  He is preoccupied with one peer and tries to take care of her.  He answers questions for her and spends most of his free time trying to take care of her.  Attempted to explain that she needed to speak for herself but he continues to speak for her since she is hearing impaired.        Intervention    PRN medication utilized:  no    Instructed in medication usage and effects  Medications administered as ordered  Encouraged to verbalize needs      Response    Verbalized understanding   Did patient take medications as ordered yes   Did patient interact with assessment?  yes     Plan    Will monitor for safety  Will monitor every 15 minutes as ordered  Will evaluate and promote the plan of care    Last BM:  unknown date  (Please chart in I/O as well)

## 2021-09-20 NOTE — NURSING NOTE
Behavior   Note any precipitants to event or behavior   Describe level and action of any aggressive behavior or speech and associated interventions.     Anxiety: Patient denies at this time  Depression: depressed mood  Pain  0  AVH   no  S/I   no  Plan  no  H/I   no  Plan  no    Affect   normal      Note: Patient interviewed in common area.  He denies SI/HI and hallucinations.  He is laughing and talking with peers.  He is preoccupied with one peer and has been asking questions for her this morning. Compliant with medication administration and assessment. NPO since midnight awaiting ketamine infusion. Took meds with sips of water.    Intervention    PRN medication utilized:  no    Instructed in medication usage and effects  Medications administered as ordered  Encouraged to verbalize needs      Response    Verbalized understanding   Did patient take medications as ordered yes   Did patient interact with assessment?  yes     Plan    Will monitor for safety  Will monitor every 15 minutes as ordered  Will evaluate and promote the plan of care    Last BM:  unknown date  (Please chart in I/O as well)

## 2021-09-21 LAB
QT INTERVAL: 356 MS
QTC INTERVAL: 395 MS

## 2021-09-21 PROCEDURE — 99232 SBSQ HOSP IP/OBS MODERATE 35: CPT | Performed by: NURSE PRACTITIONER

## 2021-09-21 RX ORDER — KETAMINE HCL IN NACL, ISO-OSM 100MG/10ML
0.5 SYRINGE (ML) INJECTION ONCE
Status: DISCONTINUED | OUTPATIENT
Start: 2021-09-22 | End: 2021-09-22

## 2021-09-21 RX ADMIN — GABAPENTIN 300 MG: 300 CAPSULE ORAL at 20:17

## 2021-09-21 RX ADMIN — CYCLOBENZAPRINE 5 MG: 5 TABLET, FILM COATED ORAL at 16:02

## 2021-09-21 RX ADMIN — TRAZODONE HYDROCHLORIDE 100 MG: 100 TABLET ORAL at 20:17

## 2021-09-21 RX ADMIN — OLANZAPINE 5 MG: 5 TABLET, FILM COATED ORAL at 20:17

## 2021-09-21 RX ADMIN — GABAPENTIN 300 MG: 300 CAPSULE ORAL at 08:10

## 2021-09-21 RX ADMIN — NICOTINE 1 PATCH: 21 PATCH, EXTENDED RELEASE TRANSDERMAL at 08:10

## 2021-09-21 RX ADMIN — Medication 2000 UNITS: at 16:02

## 2021-09-21 RX ADMIN — VENLAFAXINE HYDROCHLORIDE 150 MG: 75 CAPSULE, EXTENDED RELEASE ORAL at 08:10

## 2021-09-21 RX ADMIN — GABAPENTIN 300 MG: 300 CAPSULE ORAL at 14:18

## 2021-09-21 RX ADMIN — CYCLOBENZAPRINE 5 MG: 5 TABLET, FILM COATED ORAL at 08:10

## 2021-09-21 RX ADMIN — CYCLOBENZAPRINE 5 MG: 5 TABLET, FILM COATED ORAL at 20:17

## 2021-09-21 NOTE — NURSING NOTE
Behavior   Note any precipitants to event or behavior   Describe level and action of any aggressive behavior or speech and associated interventions.     Anxiety: Patient denies at this time  Depression: depressed mood  Pain  0  AVH   no  S/I   no  Plan  no  H/I   no  Plan  no    Affect   euthymic/normal      Note:Patient is calm and cooperative.  He is laughing with peers and appears happy.  When asked he stated that he is depressed but better. He denies SI/HI and hallucinations      Intervention    PRN medication utilized:  no    Instructed in medication usage and effects  Medications administered as ordered  Encouraged to verbalize needs      Response    Verbalized understanding   Did patient take medications as ordered yes   Did patient interact with assessment?  yes     Plan    Will monitor for safety  Will monitor every 15 minutes as ordered  Will evaluate and promote the plan of care    Last BM:  unknown date  (Please chart in I/O as well)

## 2021-09-21 NOTE — PLAN OF CARE
Goal Outcome Evaluation:  Plan of Care Reviewed With: patient  Patient Agreement with Plan of Care: agrees     Progress: improving  Outcome Summary: patient has been laughing and interacting with peers, medication compliant, no issues at this time

## 2021-09-21 NOTE — NURSING NOTE
"Behavior   Note any precipitants to event or behavior   Describe level and action of any aggressive behavior or speech and associated interventions.     Anxiety: Patient denies at this time  Depression: depressed mood  Pain  0  AVH   no  S/I   no  Plan  no  H/I   no  Plan  no    Affect   normal      Note: Patient interviewed in common area.  He denies SI/HI and hallucinations.  He is laughing and talking with peers. Patient compliant with medication administration and assessment. Reports mood is slightly better since ketamine infusion yesterday. Patient complaining about roommate because he turned the heat up yesterday afternoon. Patient states \"Y'all need to give me a better living situation than that.\"     Intervention    PRN medication utilized:  no    Instructed in medication usage and effects  Medications administered as ordered  Encouraged to verbalize needs      Response    Verbalized understanding   Did patient take medications as ordered yes   Did patient interact with assessment?  yes     Plan    Will monitor for safety  Will monitor every 15 minutes as ordered  Will evaluate and promote the plan of care    Last BM:  unknown date  (Please chart in I/O as well)    "

## 2021-09-21 NOTE — PROGRESS NOTES
"9/21/2021    Chief Complaint: suicidal ideation and depression    Subjective:  Patient is a 41 y.o. male who is an inpatient on the adult U.  Patient is alert and oriented.  Patient states that he is having some issues that occurred over the last few days.  Patient states that was upset with a nurse because of he was trying to help her hear better.  Patient states that he has instant thoughts of SI/HI when he becomes upset.   Patient reports that he is trying to calm down and not react so quickly to situations.  Patient states that his SI are situational.  Patient states that he becomes frustrated very easily.  Patient discussed with me his homelessness and the only possession he owns is his motorcycle in [place], [place].    He reports that his SI before the ketamine infusions were 10 and he now rates them a 5/10.  He states that he wants to stay for one more infusion because he does feel as though they are helping him.  He reports that he is aggravated by his new roommate and attempted to discuss with nurse.  He states that he is a \"grown ass man\" and does not appreciate how people treat him.  He does state that he is working on better coping skills and once he becomes homeless again, all his SI/HI will come back.    Pt is medication compliant, reports that he is sleeping and eating well. Pt reports positive outcome with Ketamine infusions.     Objective     Vital Signs    Temp:  [97 °F (36.1 °C)-97.7 °F (36.5 °C)] 97.7 °F (36.5 °C)  Heart Rate:  [66-97] 66  Resp:  [18] 18  BP: (107-177)/() 107/70    Physical Exam:   General Appearance: alert, appears stated age and cooperative,  Hygiene:   fair  Gait & Station: Normal, chronic limp   Musculoskeletal: No tremors or abnormal involuntary movements    Mental Status Exam:   Cooperation:  Cooperative  Eye Contact:  Good  Psychomotor Behavior:  Appropriate  Mood: Improving  Affect:  mood-congruent  Speech:  Normal  Thought Process:  Coherent  Associations: Goal " Directed  Thought Content:     Mood congruent   Suicidal:  None   Homicidal:  None   Hallucinations:  None   Delusion:  None  Cognitive Functioning:   Consciousness: awake, alert and oriented  Reliability:  fair  Insight:  Fair  Judgement:  Fair  Impulse Control:  Fair    Lab Results (last 24 hours)     ** No results found for the last 24 hours. **        Imaging Results (Last 24 Hours)     ** No results found for the last 24 hours. **          Medicine:   Current Facility-Administered Medications:   •  acetaminophen (TYLENOL) tablet 650 mg, 650 mg, Oral, Q6H PRN, Jesus Mcdermott II, MD  •  aluminum-magnesium hydroxide-simethicone (MAALOX MAX) 400-400-40 MG/5ML suspension 15 mL, 15 mL, Oral, Q6H PRN, Jesus Mcdermott II, MD  •  benztropine (COGENTIN) tablet 1 mg, 1 mg, Oral, Daily PRN **OR** benztropine (COGENTIN) injection 0.5 mg, 0.5 mg, Intramuscular, Daily PRN, Jesus Mcdermott II, MD  •  cholecalciferol (VITAMIN D3) tablet 2,000 Units, 2,000 Units, Oral, Daily With Dinner, Jesus Mcdermott II, MD, 2,000 Units at 09/20/21 1612  •  cyclobenzaprine (FLEXERIL) tablet 5 mg, 5 mg, Oral, Q8H, Kinjal Cade, APRN, 5 mg at 09/21/21 0810  •  gabapentin (NEURONTIN) capsule 300 mg, 300 mg, Oral, TID, Jesus Mcdermott II, MD, 300 mg at 09/21/21 0810  •  hydrOXYzine pamoate (VISTARIL) capsule 50 mg, 50 mg, Oral, Q6H PRN, Jesus Mcdermott II, MD  •  labetalol (NORMODYNE,TRANDATE) injection 10 mg, 10 mg, Intravenous, Q10 Min PRN, Rachelle Mason MD, 10 mg at 09/20/21 1146  •  magnesium hydroxide (MILK OF MAGNESIA) suspension 2400 mg/10mL 10 mL, 10 mL, Oral, Daily PRN, Jesus Mcdermott II, MD  •  nicotine (NICODERM CQ) 21 MG/24HR patch 1 patch, 1 patch, Transdermal, Q24H, Jesus Mcdermott II, MD, 1 patch at 09/21/21 0810  •  OLANZapine (zyPREXA) tablet 5 mg, 5 mg, Oral, Nightly, Kinjal Cade, APRN, 5 mg at 09/20/21 2025  •  ondansetron (ZOFRAN) injection 4 mg, 4 mg,  Intravenous, Once PRN, Rachelle Mason MD  •  traZODone (DESYREL) tablet 100 mg, 100 mg, Oral, Nightly, Jesus Mcdermott II, MD, 100 mg at 09/20/21 2025  •  traZODone (DESYREL) tablet 25 mg, 25 mg, Oral, Nightly PRN, Jesus Mcdermott II, MD  •  [COMPLETED] venlafaxine XR (EFFEXOR-XR) 24 hr capsule 37.5 mg, 37.5 mg, Oral, Daily With Breakfast, 37.5 mg at 09/17/21 1218 **FOLLOWED BY** venlafaxine XR (EFFEXOR-XR) 24 hr capsule 150 mg, 150 mg, Oral, Daily With Breakfast, Kinjal Cade APRN, 150 mg at 09/21/21 0810    Diagnoses/Assessment:     Suicidal ideation    MDD (major depressive disorder), recurrent severe, without psychosis (CMS/HCC)    Chronic pain syndrome    DDD (degenerative disc disease), lumbar    Neuropathy      Treatment Plan:    1) Will continue care for the patient on the behavioral health unit at [facility] to ensure patient safety.  2) Will continue to provide treatment with the unit milieu, activities, therapies and psychopharmacological management.  3) Patient to be placed on or continued on  Q15 minute checks  and Suicide precautions.  4) Pertinent medical issues:   --Chronic Pain: Neurontin and Flexeril  5) Will order following labs: none  6) Will make the following medication changes:   --Cont Trazodone 100 mg QHS  --Cont Effexor 150 mg daily  --Cont Zyprexa 5 mg nightly   --Cont Neurontin was not increased and patient did not have any concerns so will cont 300mg tid for now.  --Ketamine infusion next on Wednesday at 0.5mg/kg.  7) Will continue discharge planning as appropriate for patient.  8) Psychotherapy provided for less than 16 minutes.    Treatment plan and medication risks and benefits discussed with: Patient    ALEKSEY Alejandra  09/21/21  11:08 CDT

## 2021-09-21 NOTE — PLAN OF CARE
Goal Outcome Evaluation:  Plan of Care Reviewed With: patient  Patient Agreement with Plan of Care: agrees     Progress: improving  Outcome Summary: patient has been laughing and interacting with peers.  Med compliant.  Has slept approximately 6 hours tonight.

## 2021-09-22 PROCEDURE — 99232 SBSQ HOSP IP/OBS MODERATE 35: CPT | Performed by: PSYCHIATRY & NEUROLOGY

## 2021-09-22 RX ORDER — SODIUM CHLORIDE 9 MG/ML
INJECTION, SOLUTION INTRAVENOUS
Status: DISPENSED
Start: 2021-09-22 | End: 2021-09-22

## 2021-09-22 RX ADMIN — LABETALOL HYDROCHLORIDE 10 MG: 5 INJECTION, SOLUTION INTRAVENOUS at 12:10

## 2021-09-22 RX ADMIN — CYCLOBENZAPRINE 5 MG: 5 TABLET, FILM COATED ORAL at 20:02

## 2021-09-22 RX ADMIN — TRAZODONE HYDROCHLORIDE 100 MG: 100 TABLET ORAL at 20:02

## 2021-09-22 RX ADMIN — VENLAFAXINE HYDROCHLORIDE 150 MG: 75 CAPSULE, EXTENDED RELEASE ORAL at 08:02

## 2021-09-22 RX ADMIN — NICOTINE 1 PATCH: 21 PATCH, EXTENDED RELEASE TRANSDERMAL at 08:02

## 2021-09-22 RX ADMIN — CYCLOBENZAPRINE 5 MG: 5 TABLET, FILM COATED ORAL at 16:19

## 2021-09-22 RX ADMIN — GABAPENTIN 300 MG: 300 CAPSULE ORAL at 20:03

## 2021-09-22 RX ADMIN — OLANZAPINE 5 MG: 5 TABLET, FILM COATED ORAL at 20:02

## 2021-09-22 RX ADMIN — GABAPENTIN 300 MG: 300 CAPSULE ORAL at 08:02

## 2021-09-22 RX ADMIN — Medication 2000 UNITS: at 16:19

## 2021-09-22 RX ADMIN — KETAMINE HYDROCHLORIDE 42 MG: 10 INJECTION INTRAMUSCULAR; INTRAVENOUS at 11:28

## 2021-09-22 RX ADMIN — GABAPENTIN 300 MG: 300 CAPSULE ORAL at 14:22

## 2021-09-22 RX ADMIN — CYCLOBENZAPRINE 5 MG: 5 TABLET, FILM COATED ORAL at 08:02

## 2021-09-22 NOTE — CONSULTS
Adult Nutrition  Assessment    Patient Name:  Mo Latham  YOB: 1980  MRN: [id number]  Admit Date:  9/12/2021    Assessment Date:  9/22/2021    Comments:  Pt reports pretty good appetite.  Voiced no food preferences.  Intake 100% - 4x.  Pt NPO on occasion for Ketamine tx.  Pt denies GI distress.  Meds and labs reviewed.  Will maintain pt on prescribed diet and make recommendations as appropriate.    Reason for Assessment     Row Name 09/22/21 1633          Reason for Assessment    Reason For Assessment  follow-up protocol             Labs/Tests/Procedures/Meds     Row Name 09/22/21 1633          Labs/Procedures/Meds    Lab Results Reviewed  reviewed        Medications    Pertinent Medications Reviewed  reviewed             Nutrition Prescription Ordered     Row Name 09/22/21 1633          Nutrition Prescription PO    Current PO Diet  Regular     Other Modifiers  Safe Tray         Evaluation of Received Nutrient/Fluid Intake     Row Name 09/22/21 1634          PO Evaluation    Number of Meals  4     % PO Intake  100% - 4x               Electronically signed by:  Shayna Mota RD  09/22/21 16:36 CDT

## 2021-09-22 NOTE — NURSING NOTE
Behavior   Note any precipitants to event or behavior   Describe level and action of any aggressive behavior or speech and associated interventions.     Anxiety: Patient denies at this time  Depression: depressed mood  Pain  0  AVH   no  S/I   no  Plan  no  H/I   no  Plan  no    Affect   normal      Note: Patient interviewed in common area.  He denies SI/HI and hallucinations.  He is laughing and talking with peers. Patient compliant with medication administration and assessment. Patient has been NPO since 0000 awaiting ketamine infusion today. Took his medicines with sips of water.     Intervention    PRN medication utilized:  no    Instructed in medication usage and effects  Medications administered as ordered  Encouraged to verbalize needs      Response    Verbalized understanding   Did patient take medications as ordered yes   Did patient interact with assessment?  yes     Plan    Will monitor for safety  Will monitor every 15 minutes as ordered  Will evaluate and promote the plan of care    Last BM:  unknown date  (Please chart in I/O as well)

## 2021-09-22 NOTE — PLAN OF CARE
Goal Outcome Evaluation:  Plan of Care Reviewed With: patient  Patient Agreement with Plan of Care: agrees     Progress: improving  Outcome Summary: Patient has been calm and cooperative and laughing with peers. Compliant with medication administration. Recieved ketamine infusion today.

## 2021-09-22 NOTE — PROGRESS NOTES
Psychiatry Progress Note    9/22/2021    Legal Status: Voluntary    Chief Complaint: suicidal ideation    Subjective:  Patient is a 41 y.o. male who was hospitalized for suicidal ideation.    Patient notes improvement in mood with the ketamine.  He has been tolerating the effexor and zyprexa.  He denies suicidal thoughts today but notes he has always struggled with these thoughts when he is under situational stress.  He is concerned about rebuilding his life from homeless.  He is aware that he will be discharged tomorrow and is amenable to discharge.  He plans to go to homeless shelter.    Objective     Vital Signs    Vitals:    09/22/21 1149 09/22/21 1159 09/22/21 1209 09/22/21 1219   BP: 145/93 151/95 (!) 164/104 149/93   BP Location: Left arm Left arm Left arm Left arm   Patient Position: Lying Lying Lying Lying   Pulse: 76 80 94 65   Resp: 18 18 18 18   Temp:       TempSrc:       SpO2: 97% 97% 97% 97%   Weight:       Height:           Physical Exam:   General Appearance: alert, appears stated age and cooperative,  Hygiene:   good  Gait & Station: Limp  Musculoskeletal: No tremors or abnormal involuntary movements    Mental Status Exam:   Cooperation:  Cooperative  Eye Contact:  Good  Psychomotor Behavior:  Appropriate  Mood: Improving  Affect:  normal  Speech:  Normal  Thought Process:  Coherent  Associations: Goal Directed  Thought Content:     Mood congruent   Suicidal:  Denies suicidal thoughts    Homicidal:  None   Hallucinations:  None   Delusion:  None  Cognitive Functioning:   Consciousness: awake, alert and oriented  Reliability:  appears open  Insight:  Fair  Judgement:  Fair  Impulse Control:  Fair    Lab Results: Results source: EMR   Lab Results (last 24 hours)     ** No results found for the last 24 hours. **          Radiology Results:  Imaging Results (Last 24 Hours)     ** No results found for the last 24 hours. **          Medicine:   Current Facility-Administered Medications:   •  acetaminophen  (TYLENOL) tablet 650 mg, 650 mg, Oral, Q6H PRN, Jesus Mcdermott II, MD  •  aluminum-magnesium hydroxide-simethicone (MAALOX MAX) 400-400-40 MG/5ML suspension 15 mL, 15 mL, Oral, Q6H PRN, Jesus Mcdermott II, MD  •  benztropine (COGENTIN) tablet 1 mg, 1 mg, Oral, Daily PRN **OR** benztropine (COGENTIN) injection 0.5 mg, 0.5 mg, Intramuscular, Daily PRN, Jesus Mcdermott II, MD  •  cholecalciferol (VITAMIN D3) tablet 2,000 Units, 2,000 Units, Oral, Daily With Dinner, Jesus Mcdermott II, MD, 2,000 Units at 09/21/21 1602  •  cyclobenzaprine (FLEXERIL) tablet 5 mg, 5 mg, Oral, Q8H, Kinjal Cade, APRN, 5 mg at 09/22/21 0802  •  gabapentin (NEURONTIN) capsule 300 mg, 300 mg, Oral, TID, Jesus Mcdermott II, MD, 300 mg at 09/22/21 0802  •  hydrOXYzine pamoate (VISTARIL) capsule 50 mg, 50 mg, Oral, Q6H PRN, Jesus Mcdermott II, MD  •  labetalol (NORMODYNE,TRANDATE) injection 10 mg, 10 mg, Intravenous, Q10 Min PRN, Rachelle Mason MD, 10 mg at 09/22/21 1210  •  magnesium hydroxide (MILK OF MAGNESIA) suspension 2400 mg/10mL 10 mL, 10 mL, Oral, Daily PRN, Jesus Mcdermott II, MD  •  nicotine (NICODERM CQ) 21 MG/24HR patch 1 patch, 1 patch, Transdermal, Q24H, Jesus Mcdermott II, MD, 1 patch at 09/22/21 0802  •  OLANZapine (zyPREXA) tablet 5 mg, 5 mg, Oral, Nightly, Kinjal Cade E, APRN, 5 mg at 09/21/21 2017  •  ondansetron (ZOFRAN) injection 4 mg, 4 mg, Intravenous, Once PRN, Rachelle Mason MD  •  sodium chloride 0.9 % infusion  - ADS Override Pull, , , ,   •  traZODone (DESYREL) tablet 100 mg, 100 mg, Oral, Nightly, Jesus Mcdermott II, MD, 100 mg at 09/21/21 2017  •  traZODone (DESYREL) tablet 25 mg, 25 mg, Oral, Nightly PRN, Jesus Mcdermott II, MD  •  [COMPLETED] venlafaxine XR (EFFEXOR-XR) 24 hr capsule 37.5 mg, 37.5 mg, Oral, Daily With Breakfast, 37.5 mg at 09/17/21 1218 **FOLLOWED BY** venlafaxine XR (EFFEXOR-XR) 24 hr capsule 150 mg, 150 mg, Oral, Daily  With Breakfast, Cade Kinjal E, APRN, 150 mg at 09/22/21 0802    Diagnoses/Assessment:     Suicidal ideation    MDD (major depressive disorder), recurrent severe, without psychosis (CMS/HCC)    Chronic pain syndrome    DDD (degenerative disc disease), lumbar    Neuropathy    Treatment Plan:    1) Will continue care for the patient on the behavioral health unit at [facility] to ensure patient safety.  2) Will continue to provide treatment with the unit milieu, activities, therapies and psychopharmacological management.  3) Patient to be placed on or continued on  Q15 minute checks  and Suicide and Fall precautions.  4) Pertinent medical issues:   --Chronic Pain: Neurontin and Flexeril  5) Will order following labs: none  6) Will make the following medication changes:   --Cont Trazodone 100 mg QHS  --Cont Effexor 150 mg daily  --Cont Zyprexa 5 mg nightly   --Cont Neurontin 300mg tid  7) Will continue discharge planning as appropriate for patient.    Treatment plan and medication risks and benefits discussed with: Patient     Rachelle Mason MD  09/22/21 at 12:29 CDT

## 2021-09-22 NOTE — PLAN OF CARE
Goal Outcome Evaluation:  Plan of Care Reviewed With: patient        Progress: no change  Outcome Summary: Intake 100% - 4x.  Maintain pt on prescribed diet.  Encourage intake.

## 2021-09-22 NOTE — PLAN OF CARE
Goal Outcome Evaluation:  Plan of Care Reviewed With: patient  Patient Agreement with Plan of Care: agrees     Progress: improving  Outcome Summary: Patient has been calm and cooperative laughing with peers.  Patient has slept approximately 4 1/2 hours tonight.

## 2021-09-22 NOTE — NURSING NOTE
Ketamine infusion started. 20 g IV started to R AC by nursing student. x1 attempt. Blood return noted. Baseline set of vitals obtained. RNSusi and Dr. Mason at bedside.

## 2021-09-23 VITALS
RESPIRATION RATE: 18 BRPM | WEIGHT: 185 LBS | SYSTOLIC BLOOD PRESSURE: 123 MMHG | HEIGHT: 69 IN | BODY MASS INDEX: 27.4 KG/M2 | HEART RATE: 73 BPM | DIASTOLIC BLOOD PRESSURE: 81 MMHG | OXYGEN SATURATION: 99 % | TEMPERATURE: 96.5 F

## 2021-09-23 PROBLEM — R45.851 SUICIDAL IDEATION: Status: RESOLVED | Noted: 2021-09-12 | Resolved: 2021-09-23

## 2021-09-23 PROCEDURE — 99239 HOSP IP/OBS DSCHRG MGMT >30: CPT | Performed by: PSYCHIATRY & NEUROLOGY

## 2021-09-23 RX ORDER — TRAZODONE HYDROCHLORIDE 100 MG/1
100 TABLET ORAL NIGHTLY
Qty: 30 TABLET | Refills: 1 | Status: SHIPPED | OUTPATIENT
Start: 2021-09-23

## 2021-09-23 RX ORDER — CYCLOBENZAPRINE HCL 5 MG
5 TABLET ORAL EVERY 8 HOURS SCHEDULED
Qty: 90 TABLET | Refills: 0 | Status: SHIPPED | OUTPATIENT
Start: 2021-09-23 | End: 2021-09-23

## 2021-09-23 RX ORDER — OLANZAPINE 5 MG/1
5 TABLET ORAL NIGHTLY
Qty: 30 TABLET | Refills: 1 | Status: SHIPPED | OUTPATIENT
Start: 2021-09-23

## 2021-09-23 RX ORDER — TRAZODONE HYDROCHLORIDE 100 MG/1
100 TABLET ORAL NIGHTLY
Qty: 30 TABLET | Refills: 1 | Status: SHIPPED | OUTPATIENT
Start: 2021-09-23 | End: 2021-09-23

## 2021-09-23 RX ORDER — CYCLOBENZAPRINE HCL 5 MG
5 TABLET ORAL EVERY 8 HOURS SCHEDULED
Qty: 90 TABLET | Refills: 0 | Status: SHIPPED | OUTPATIENT
Start: 2021-09-23

## 2021-09-23 RX ORDER — OLANZAPINE 5 MG/1
5 TABLET ORAL NIGHTLY
Qty: 30 TABLET | Refills: 1 | Status: SHIPPED | OUTPATIENT
Start: 2021-09-23 | End: 2021-09-23

## 2021-09-23 RX ORDER — VENLAFAXINE HYDROCHLORIDE 150 MG/1
150 CAPSULE, EXTENDED RELEASE ORAL
Qty: 30 CAPSULE | Refills: 1 | Status: SHIPPED | OUTPATIENT
Start: 2021-09-24 | End: 2021-09-23

## 2021-09-23 RX ORDER — GABAPENTIN 300 MG/1
300 CAPSULE ORAL 3 TIMES DAILY
Qty: 90 CAPSULE | Refills: 0 | Status: SHIPPED | OUTPATIENT
Start: 2021-09-23 | End: 2021-09-23 | Stop reason: SDUPTHER

## 2021-09-23 RX ORDER — MELATONIN
2000
Qty: 60 TABLET | Refills: 1 | Status: SHIPPED | OUTPATIENT
Start: 2021-09-23

## 2021-09-23 RX ORDER — GABAPENTIN 300 MG/1
300 CAPSULE ORAL 3 TIMES DAILY
Qty: 90 CAPSULE | Refills: 0 | Status: SHIPPED | OUTPATIENT
Start: 2021-09-23

## 2021-09-23 RX ORDER — VENLAFAXINE HYDROCHLORIDE 150 MG/1
150 CAPSULE, EXTENDED RELEASE ORAL
Qty: 30 CAPSULE | Refills: 1 | Status: SHIPPED | OUTPATIENT
Start: 2021-09-24

## 2021-09-23 RX ORDER — MELATONIN
2000
Start: 2021-09-23 | End: 2021-09-23

## 2021-09-23 RX ADMIN — CYCLOBENZAPRINE 5 MG: 5 TABLET, FILM COATED ORAL at 14:42

## 2021-09-23 RX ADMIN — GABAPENTIN 300 MG: 300 CAPSULE ORAL at 14:16

## 2021-09-23 RX ADMIN — CYCLOBENZAPRINE 5 MG: 5 TABLET, FILM COATED ORAL at 08:13

## 2021-09-23 RX ADMIN — Medication 2000 UNITS: at 14:41

## 2021-09-23 RX ADMIN — NICOTINE 1 PATCH: 21 PATCH, EXTENDED RELEASE TRANSDERMAL at 08:13

## 2021-09-23 RX ADMIN — VENLAFAXINE HYDROCHLORIDE 150 MG: 75 CAPSULE, EXTENDED RELEASE ORAL at 08:13

## 2021-09-23 RX ADMIN — GABAPENTIN 300 MG: 300 CAPSULE ORAL at 08:13
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
## 2021-09-23 NOTE — PLAN OF CARE
Goal Outcome Evaluation:  Plan of Care Reviewed With: patient  Patient Agreement with Plan of Care: agrees     Progress: improving  Outcome Summary: Patient has slept approximately 5 hours thus far during shift. Pt denied SI, HI and AVH

## 2021-09-23 NOTE — NURSING NOTE
Behavior   Note any precipitants to event or behavior   Describe level and action of any aggressive behavior or speech and associated interventions.     Anxiety: Patient denies at this time  Depression: Patient denies at this time  Pain  0  AVH   no  S/I   no  Plan  no  H/I   no  Plan  no    Affect   euthymic/normal      Note: Patient sitting in dining area laughing and talking to peers. Patient denies SI, HI and AVH at this time.      Intervention    PRN medication utilized:  no    Instructed in medication usage and effects  Medications administered as ordered  Encouraged to verbalize needs      Response    Verbalized understanding   Did patient take medications as ordered yes   Did patient interact with assessment?  yes     Plan    Will monitor for safety  Will monitor every 15 minutes as ordered  Will evaluate and promote the plan of care    Last BM:   (Please chart in I/O as well)

## 2021-09-23 NOTE — NURSING NOTE
Patient is alert and oriented. Patient is ambulatory at discharge. Personal belongings returned to patient from [facility] and security. Home medications returned to patient from pharmacy. AVS and safety plan provided. Friend here to  patient and transport him to [place].

## 2021-09-23 NOTE — SIGNIFICANT NOTE
LPCA met with patient 1:1 and reviewed safety and discussed discharge plan. Patient presented to the common room, dressed in regular clothing, alert and oriented x4. Patient’s mood is good, and her affect is neutral. Patient makes good eye contact, speech is normal. Patient denies suicidal and homicidal ideations. Patient denies any auditory or visual hallucinations. Patient is feeling nervous about being discharged because he is homeless. Patient reports that he plans to stay with a friend. Patient shows increased insight into his mental health illness. LPCA discussed importance of continuing therapy and medication management utilizing Cognitive Behavioral Therapy and other modalities. Plan is for patient to follow up outpatient with [facility] in [facility]. Patient has been educated on Crisis Hotline, and advised to call as needed. Patient denies having access to firearms, and does not appear to be an imminent risk to self or others. Plan to discharge patient this afternoon with a friend.

## 2022-12-01 ENCOUNTER — HOSPITAL ENCOUNTER (INPATIENT)
Age: 42
LOS: 1 days | Discharge: HOME | End: 2022-12-02
Attending: PSYCHIATRY & NEUROLOGY | Admitting: PSYCHIATRY & NEUROLOGY
Payer: MEDICAID

## 2022-12-01 PROBLEM — R45.851 DEPRESSION WITH SUICIDAL IDEATION: Status: ACTIVE | Noted: 2022-12-01

## 2022-12-01 PROBLEM — F32.A DEPRESSION WITH SUICIDAL IDEATION: Status: ACTIVE | Noted: 2022-12-01

## 2022-12-01 PROBLEM — F34.9 PERSISTENT MOOD (AFFECTIVE) DISORDER, UNSPECIFIED (HCC): Status: ACTIVE | Noted: 2022-12-01

## 2022-12-01 PROCEDURE — 1240000000 HC EMOTIONAL WELLNESS R&B

## 2022-12-01 RX ORDER — TRAZODONE HYDROCHLORIDE 50 MG/1
50 TABLET ORAL NIGHTLY PRN
Status: DISCONTINUED | OUTPATIENT
Start: 2022-12-01 | End: 2022-12-02 | Stop reason: HOSPADM

## 2022-12-01 RX ORDER — NICOTINE 21 MG/24HR
1 PATCH, TRANSDERMAL 24 HOURS TRANSDERMAL DAILY
Status: DISCONTINUED | OUTPATIENT
Start: 2022-12-01 | End: 2022-12-02 | Stop reason: HOSPADM

## 2022-12-01 RX ORDER — POLYETHYLENE GLYCOL 3350 17 G/17G
17 POWDER, FOR SOLUTION ORAL DAILY PRN
Status: DISCONTINUED | OUTPATIENT
Start: 2022-12-01 | End: 2022-12-02 | Stop reason: HOSPADM

## 2022-12-01 RX ORDER — HYDROXYZINE HYDROCHLORIDE 25 MG/1
25 TABLET, FILM COATED ORAL 3 TIMES DAILY PRN
Status: DISCONTINUED | OUTPATIENT
Start: 2022-12-01 | End: 2022-12-02 | Stop reason: HOSPADM

## 2022-12-01 RX ORDER — RISPERIDONE 1 MG/1
1 TABLET, ORALLY DISINTEGRATING ORAL 2 TIMES DAILY PRN
Status: DISCONTINUED | OUTPATIENT
Start: 2022-12-01 | End: 2022-12-02 | Stop reason: HOSPADM

## 2022-12-01 RX ORDER — TRAZODONE HYDROCHLORIDE 50 MG/1
50 TABLET ORAL NIGHTLY
Status: DISCONTINUED | OUTPATIENT
Start: 2022-12-01 | End: 2022-12-01

## 2022-12-01 RX ORDER — IBUPROFEN 400 MG/1
400 TABLET ORAL EVERY 6 HOURS PRN
Status: DISCONTINUED | OUTPATIENT
Start: 2022-12-01 | End: 2022-12-02 | Stop reason: HOSPADM

## 2022-12-01 RX ORDER — ACETAMINOPHEN 325 MG/1
650 TABLET ORAL EVERY 4 HOURS PRN
Status: DISCONTINUED | OUTPATIENT
Start: 2022-12-01 | End: 2022-12-02 | Stop reason: HOSPADM

## 2022-12-01 NOTE — PLAN OF CARE
Group Therapy Note     Date: 12/1/2022  Start Time: 1000  End Time:  9534  Number of Participants: 9     Type of Group: Psychotherapy     Patient's Goal: Patient will process emotions and actions and how to relate to other or their with others. Patient discussed open communication and feelings and emotions. Notes:  Patient attended process group as scheduled, patient identified a issue to work on today regarding how they will interact and relate to others. Participation Level:  Active Listener     Participation Quality: Appropriate, Attentive, and Sharing        Speech:  normal        Thought Process/Content: Logical        Affective Functioning: Congruent        Mood: euthymic        Level of consciousness:  Alert        Response to Learning: Able to verbalize current knowledge/experience        Endings: None Reported     Modes of Intervention: Education, Support, and Socialization        Discipline Responsible: /Counselor        Signature:  Owen Barrera [facility]

## 2022-12-01 NOTE — BH NOTE
Patient is a direct admit from [facility], admitted to the [facility] unit at this time. Per notes from [facility], patient reported SI with intent and means during consult with [place] rep. On review of records sent from facility, UDS is negative. Electronically signed, Ben Espinoza LPN, Utilization Review , [facility]
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
## 2022-12-01 NOTE — SIGNIFICANT EVENT
Writer went to dining area to have patient sign legal/admission paperwork. Patient signed all papers, however reported that he did not have anyone to put down for collateral information. When we got to the [address] and Agreement Form, patient slid form back across table to writer and stated \"Yeah, I'm not signing that. \"  Writer asked patient if there were any questions writer could answer about the form however patient stated \"No, I'm just not signing that because I won't be doing any of that. \" Patient referring to agreeing to meet with provider, participate in group therapies, etc.    Electronically signed by Opal Angulo RN on 12/1/2022 at 5:00 PM

## 2022-12-02 VITALS
DIASTOLIC BLOOD PRESSURE: 75 MMHG | SYSTOLIC BLOOD PRESSURE: 116 MMHG | OXYGEN SATURATION: 100 % | TEMPERATURE: 97.3 F | HEART RATE: 67 BPM | RESPIRATION RATE: 16 BRPM

## 2022-12-02 PROBLEM — Z59.00 HOMELESSNESS: Status: ACTIVE | Noted: 2022-12-02

## 2022-12-02 LAB
ALBUMIN SERPL-MCNC: 3.7 G/DL (ref 3.5–5.2)
ALP BLD-CCNC: 69 U/L (ref 40–130)
ALT SERPL-CCNC: 20 U/L (ref 5–41)
ANION GAP SERPL CALCULATED.3IONS-SCNC: 8 MMOL/L (ref 7–19)
AST SERPL-CCNC: 15 U/L (ref 5–40)
BASOPHILS ABSOLUTE: 0 K/UL (ref 0–0.2)
BASOPHILS RELATIVE PERCENT: 0.4 % (ref 0–1)
BILIRUB SERPL-MCNC: <0.2 MG/DL (ref 0.2–1.2)
BUN BLDV-MCNC: 14 MG/DL (ref 6–20)
CALCIUM SERPL-MCNC: 9 MG/DL (ref 8.6–10)
CHLORIDE BLD-SCNC: 103 MMOL/L (ref 98–111)
CO2: 26 MMOL/L (ref 22–29)
CREAT SERPL-MCNC: 0.7 MG/DL (ref 0.5–1.2)
EOSINOPHILS ABSOLUTE: 0.4 K/UL (ref 0–0.6)
EOSINOPHILS RELATIVE PERCENT: 5.2 % (ref 0–5)
GFR SERPL CREATININE-BSD FRML MDRD: >60 ML/MIN/{1.73_M2}
GLUCOSE BLD-MCNC: 102 MG/DL (ref 74–109)
HCT VFR BLD CALC: 42.8 % (ref 42–52)
HEMOGLOBIN: 13.6 G/DL (ref 14–18)
IMMATURE GRANULOCYTES #: 0.1 K/UL
LYMPHOCYTES ABSOLUTE: 2 K/UL (ref 1.1–4.5)
LYMPHOCYTES RELATIVE PERCENT: 30.4 % (ref 20–40)
MCH RBC QN AUTO: 30.8 PG (ref 27–31)
MCHC RBC AUTO-ENTMCNC: 31.8 G/DL (ref 33–37)
MCV RBC AUTO: 96.8 FL (ref 80–94)
MONOCYTES ABSOLUTE: 0.8 K/UL (ref 0–0.9)
MONOCYTES RELATIVE PERCENT: 12.4 % (ref 0–10)
NEUTROPHILS ABSOLUTE: 3.4 K/UL (ref 1.5–7.5)
NEUTROPHILS RELATIVE PERCENT: 50.7 % (ref 50–65)
PDW BLD-RTO: 13.1 % (ref 11.5–14.5)
PLATELET # BLD: 276 K/UL (ref 130–400)
PMV BLD AUTO: 10.9 FL (ref 9.4–12.4)
POTASSIUM SERPL-SCNC: 4.5 MMOL/L (ref 3.5–5)
RBC # BLD: 4.42 M/UL (ref 4.7–6.1)
SODIUM BLD-SCNC: 137 MMOL/L (ref 136–145)
TOTAL PROTEIN: 5.8 G/DL (ref 6.6–8.7)
TSH REFLEX FT4: 1.52 UIU/ML (ref 0.35–5.5)
VITAMIN B-12: 268 PG/ML (ref 211–946)
VITAMIN D 25-HYDROXY: 21.1 NG/ML
WBC # BLD: 6.7 K/UL (ref 4.8–10.8)

## 2022-12-02 PROCEDURE — 80053 COMPREHEN METABOLIC PANEL: CPT

## 2022-12-02 PROCEDURE — 36415 COLL VENOUS BLD VENIPUNCTURE: CPT

## 2022-12-02 PROCEDURE — 82306 VITAMIN D 25 HYDROXY: CPT

## 2022-12-02 PROCEDURE — 5130000000 HC BRIDGE APPOINTMENT

## 2022-12-02 PROCEDURE — 84443 ASSAY THYROID STIM HORMONE: CPT

## 2022-12-02 PROCEDURE — 85025 COMPLETE CBC W/AUTO DIFF WBC: CPT

## 2022-12-02 PROCEDURE — 82607 VITAMIN B-12: CPT

## 2022-12-02 NOTE — PROGRESS NOTES
[facility] Adult Unit Daily Assessment  Nursing Progress Note    Room: [facility]/606-01   Name: Rolande Peabody   Age: 43 y.o. Gender: male   Dx: Persistent mood (affective) disorder, unspecified ([address]  Precautions: close watch and suicide risk  Inpatient Status: voluntary       SLEEP:  Sleep Quality Poor  Sleep Medications: No pt refused medications  PRN Sleep Meds: No pt refused medications2      MEDICAL:  Other PRN Meds: No   Med Compliant: No pt refused medications and refused to go to groups,   Accu-Chek: No  Oxygen/CPAP/BiPAP: No  CIWA/CINA: No   PAIN Assessment: none  Side Effects from medication: No      Metabolic Screening:  Lab Results   Component Value Date    LABA1C 5.7 11/18/2018     Lab Results   Component Value Date    CHOL 235 (H) 11/18/2018     Lab Results   Component Value Date    TRIG 84 11/18/2018     Lab Results   Component Value Date    HDL 52 (L) 11/18/2018     No components found for: LDLCAL  No results found for: LABVLDL  There is no height or weight on file to calculate BMI. BP Readings from Last 2 Encounters:   12/01/22 134/62   09/10/21 116/74         Medical Bed:   Is patient in a medical bed? no   If medical bed is in use, has nursing secured room while patient is awake and out of the room? NA  Has safety checks by nursing been completed on the bed/room this shift? NA    Protective Factors:  Patient identifies protective factors with nursing staff as follows:    Identifies reasons for living: Yes   Supportive Social Network or family: No    Belief that suicide is immoral/high spirituality: not really, not sure   Responsibility to family or others/living with family: No   Fear of death or dying due to pain and suffering: Yes   Engaged in work or school: No     If Patient is unable to identify, reason why? na      PSYCH:  Depression: HIGH   Anxiety: HIGH   SI passive ,I have them all the time, I have them constantly for 30 some years  Risk of Suicide: Low Risk  HI Negative for homicidal ideation AVH:no If Hallucinations are present, describe? no        GENERAL:  Appetite: improved   Percent Meals: no meals this shift, pt eating and drinking, this shift   Social: No   Speech: normal and hesitant   Appearance: appropriately dressed    GROUP:  Group Participation: No  Participation Quality: none    Notes:   Pt in TV areas, eating a sandwich and chips, pt isolative to self, pt is not social with peers, pt is watching television, not social with others. Appetite has improved, pt refused to go to groups on days, and pt refused all medications. Pt stated, I\" have been depressed and suicidal for 30 some years and medication do not work and groups do not work\" pt sitting in TV area, flat affect, minimal conversation with staff and peers, pt stated that \"I want to go the [employer], I'm just waiting. The medications and groups don't work with me\" pt also stated that he has SI ideations all the time, \"the  thoughts are always with me\" patient positive for passive SI ideations and pt did contract for safety on the unit with nurse. Pt denies HI and AVH. Pt states that he has been having poor sleep prior to admission. Pt resting in bed at this time and will continue to monitor for safety and comfort.       Electronically signed by Marifer Navarro RN on 12/1/22 at 11:59 PM CST

## 2022-12-02 NOTE — PROGRESS NOTES
Treatment Team Note:    Target Symptoms/Reason for admission: Per nursing admission assessment - Reason for Admission: presented with suicidal ideation with thoughts to hang himself or jump out of traffic    Diagnoses per psych provider: Depression with suicidal ideation [F32. Zackery Fordning    Therapist met with treatment team to discuss patients treatment and discharge plans. Patient's aftercare plan is: SW will meet with patient to gather information    Aftercare appointments made: No - SW will make discharge appointments    Pt lives with: patient lives alone    Collateral obtained from: SW will meet with patient to gather information  Collateral obtained on:new admissioon    Attending groups: No    Behavior: calm    Has patient been completing ADL's:  Yes    SI:  patient denies SI  Plan: no   If yes describe: N/A - patient denies plan  HI:  patient denies HI  If present describe: N/A  Delusions: patient denies delusions  If present describe: N/A  Hallucinations: patient denies hallucinations  If present describe: N/A    Patient rates their -- Depression: 1-10:  0  Anxiety:1-10:  0    Sleeping:New admission - unknown at this time. Taking medication: New admission - unknown at this time.     Misc:

## 2022-12-02 NOTE — H&P
HISTORY and PHYSICAL      CHIEF COMPLAINT:  Depression, SI    Reason for Admission:  Depression, SI    History Obtained From:  patient    HISTORY OF PRESENT ILLNESS:      The patient is a 43 y.o. male who is admitted to the [place] [address] unit with worsening mood issues. He has no c/o CP or SOA. He has no new pain issues. No abdominal pain or N/V. He has no dysuria. No Ha or dizziness. Past Medical History:        Diagnosis Date    Anxiety     Depression     Panic attack     [facility] spotted fever 2005     Past Surgical History:        Procedure Laterality Date    LYMPHADENECTOMY Left     removed when was a child, early 80's    OTHER SURGICAL HISTORY       LYMPTH NODE REMOVED IN NECK AS A CHILD AFTER CAT SCRATCH         Medications Prior to Admission:    Medications Prior to Admission: gabapentin (NEURONTIN) 300 MG capsule, Take 1 capsule by mouth 3 times daily for 7 days. DULoxetine (CYMBALTA) 20 MG extended release capsule, Take 1 capsule by mouth daily for 14 days  traZODone (DESYREL) 50 MG tablet, Take 1 tablet by mouth nightly as needed for Sleep  gabapentin (NEURONTIN) 300 MG capsule, Take 1 capsule by mouth 3 times daily for 14 days. .    Allergies:  Patient has no known allergies. Social History:   TOBACCO:   reports that he has been smoking cigarettes. He has been smoking an average of 2 packs per day. He has never used smokeless tobacco.  ETOH:   reports no history of alcohol use. DRUGS:   reports no history of drug use.   MARITAL STATUS:  single  OCCUPATION:  Not working  Patient currently is homeless      Family History:       Problem Relation Age of Onset    Diabetes Father     Heart Disease Father      REVIEW OF SYSTEMS:  Constitutional: neg  CV: neg  Pulmonary: neg  GI: neg  : neg  Psych: depression, SI  Neuro: neg  Skin: neg  MusculoSkeletal: neg  HEENT: neg  Joints: neg    Vitals:  /62   Pulse 72   Temp 98.6 °F (37 °C) (Temporal) Resp 16   SpO2 97%     PHYSICAL EXAM:  Gen: NAD, alert  HEENT: WNL  Lymph: no LAD  Neck: no JVD or masses  Chest: CTA bilat  CV: RRR  Abdomen: NT/ND  Extrem: no C/C/E  Neuro: non focal  Skin: no rashes  Joints: no redness    DATA:  I have reviewed the admission labs and imaging tests.     ASSESSMENT AND PLAN:      Patient Active Hospital Problem List:   Depression, SI---follow with Jp Proctor MD  12:01 AM 12/2/2022

## 2022-12-02 NOTE — DISCHARGE INSTRUCTIONS
Learning About Mood Disorders  What are mood disorders? Mood disorders are medical problems that affect how you feel. They can impact your moods, thoughts, and actions. Mood disorders include:  Depression. This causes you to feel sad or hopeless for much of the time. Bipolar disorder. This causes extreme mood changes from manic episodes of very high energy to extreme lows of depression. Seasonal affective disorder (SAD). This is a type of depression that affects you during the same season each year. Most often people experience SAD during the fall and winter months when days are shorter and there is less light. What are the symptoms? Depression  You may:  Feel sad or hopeless nearly every day. Lose interest in or not get pleasure from most daily activities. You feel this way nearly every day. Have low energy, changes in your appetite, or changes in how well you sleep. Have trouble concentrating. Think about death and suicide. Bipolar disorder  Symptoms depend on your mood swings. You may:  Feel very happy, energetic, or on edge. Feel like you need very little sleep. Feel overly self-confident. Do impulsive things, such as spending a lot of money. Feel sad or hopeless. Have racing thoughts or trouble thinking and making decisions. Lose interest in things you have enjoyed in the past.  Think about death and suicide. Seasonal affective disorder (SAD)  Symptoms come and go at about the same time each year. For most people with SAD, symptoms come during the winter when there is less daylight. You may:  Feel sad, grumpy, zhong, or anxious. Lose interest in your usual activities. Eat more and crave carbohydrates, such as bread and pasta. Gain weight. Sleep more and feel drowsy during the daytime.   Where to get help 24 hours a day, 7 days a week   If you or someone you know talks about suicide, self-harm, a mental health crisis, a substance use crisis, or any other kind of emotional distress, get help right away. You can:  Call the Suicide and Crisis Lifeline at 65. Call 1-906-568-KYGS (2-201.946.3523). Text HOME to 003018 to access the Crisis Text Line. Consider saving these numbers in your phone. How are mood disorders treated? Mood disorders can be treated with medicines or counseling, or a combination of both. Medicines for depression and SAD may include antidepressants. Medicines for bipolar disorder may include:  Mood stabilizers. Antipsychotics. Benzodiazepines. Counseling may involve cognitive behavioral therapy. It teaches you how to change the ways you think and behave. This can help you stop thinking bad thoughts about yourself and your life. Light therapy is the main treatment for SAD. This therapy uses a special kind of lamp. You let the lamp shine on you at certain times, usually in the morning. This may help your symptoms during the months when there is less sunlight. Healthy lifestyle  Healthy lifestyle changes may help you feel better. Be active often. You might try walking or strength training. Eat a healthy diet. Include fruits, vegetables, lean proteins, and whole grains in your diet each day. Keep a regular sleep schedule. Try for 8 hours of sleep a night. Find ways to manage stress, such as relaxation exercises. Avoid alcohol and illegal drugs. Follow-up care is a key part of your treatment and safety. Be sure to make and go to all appointments, and call your doctor if you are having problems. It's also a good idea to know your test results and keep a list of the medicines you take. Where can you learn more? Go to https://lory.[employer]). org and sign in to your [employer] account. Enter N808 in the [facility] box to learn more about \"Learning About Mood Disorders. \"     If you do not have an account, please click on the \"Sign Up Now\" link.   Current as of: February 9, 2022               Content Version: 13.4  © 5184-0755 Healthwise, Incorporated. Care instructions adapted under license by [facility] ([facility]). If you have questions about a medical condition or this instruction, always ask your healthcare professional. [address] any warranty or liability for your use of this information. Medications:   Medication Summary Provided. I understand that I should take only the medications on my list.   --why and when I need to take each medication. --which side effects to watch for.   --that I should carry my medication information at all times in case of emergency    Situations. --I will take all medications until discontinued by physician. I will take all my medications to follow up appointments. --check with my physician or pharmacist before taking any new medication, over    the counter product or drink alcohol.   --ask about food, drug and dietary supplement interactions. --discard old lists and update records with medication providers. Behavior Health Follow Up:  Original Referral Source: ED  Discharge Diagnosis: [unfilled]  Recomendations for Level of Care: Follow Up  Patient Status at Discharge: Stable  My Hospital  was: [facility]  Aftercare plan faxed:    Faxed by: Social Work staff   Date: 22  Patient has no prescriptions this visit.     General Information:   Questions regarding your bill: Call [phone]   Suicide Hotline (Rescue Crisis) 3-599.760.5452   To obtain results of pending studies call Medical Records at: [phone]   For emergencies and 24 hour/7 days a week contact information: [phone]

## 2022-12-02 NOTE — PROGRESS NOTES
[facility] Admission Note  Nursing Admission Note        Reason for Admission: presented with suicidal ideation with thoughts to hang himself or jump out of traffic    Patient Active Problem List   Diagnosis    Substance induced mood disorder ([facility]    Suicidal ideation    Suicidal ideations    Major depression, recurrent ([facility]    Tobacco use disorder    Severe episode of recurrent major depressive disorder, without psychotic features ([facility]    Homicidal ideations    Persistent mood (affective) disorder, unspecified ([facility]         Addictive Behavior:        Medical Problems:   Past Medical History:   Diagnosis Date    Anxiety     Depression     Panic attack     [place] spotted fever 2005       Status EXAM:  Mental Status and Behavioral Exam  Normal: No  Level of Assistance: Independent/Self  Facial Expression: Flat  Affect: Blunt  Level of Consciousness: Alert  Frequency of Checks: 4 times per hour, close  Mood:Normal: No  Mood: Anxious, Labile, Suspicious, Depressed, Irritable  Motor Activity:Normal: No  Motor Activity: Decreased, Unusual posture/gait  Eye Contact: Poor  Observed Behavior: Exit seeking  Sexual Misconduct History: Current - no  Preception: [place] to person, [place] to place, [place] to situation, [place] to time  Attention:Normal: No  Attention: Distractible  Thought Processes: Blocking  Thought Content:Normal: No  Thought Content: Poverty of content  Depression Symptoms: Appetite change  Anxiety Symptoms: Generalized, Feelings of doom, Unexplained fears  Raysa Symptoms: No problems reported or observed. Hallucinations: None  Delusions: No  Memory:Normal: No  Memory: Confabulation, Poor recent, Poor remote  Insight and Judgment: No    Psych:   Suicidal Ideation: no.  If yes, is there a plan? (Describe) no              Risk of Suicide: No Risk   Homicidal Ideation:  no.  If yes, describe: Auditory/Visual Hallucinations:  no.      If yes, describe AVH?   *    Metabolic Screening:  Lab Results   Component Value Date    LABA1C 5.7 11/18/2018     Lab Results   Component Value Date    CHOL 235 (H) 11/18/2018     Lab Results   Component Value Date    TRIG 84 11/18/2018     Lab Results   Component Value Date    HDL 52 (L) 11/18/2018     No components found for: LDLCAL  No results found for: LABVLDL  There is no height or weight on file to calculate BMI. BP Readings from Last 2 Encounters:   12/01/22 112/71   09/10/21 116/74       PATIENT STRENGTHS and Barriers:          Tobacco Screening:  Practical Counseling, on admission, juana X, if applicable and completed (first 3 are required if patient doesn't refuse):            Recognizing danger situations (included triggers and roadblocks)   refuses              Coping skills (new ways to manage stress, exercise, relaxation techniques, changing routine, distraction  refused                                                    Basic information about quitting (benefits of quitting, techniques in how to quit, available resources refused  Referral for counseling faxed to [facility]     refuses                                       Patient refused counseling refcused  Patient has not smoked in the last 30 days refused  Patient offered nicotine patch. Received refused  Refused    Patient is a non-smoker         Admission to Unit:    Pt admitted to [facility] under the care of Dr. Sallie Pope,  arrived on unit via [facility] with security and staff from [facility] Jacobson     Patient arrived dressed in paper scrubs:  yes. Body assessment and safety check completed by Javier Singh and  [facility] RN no contraband discovered. Patient belongings and valuables was cataloged and accounted for by Javier Singh.      Admission completed by Virginia Tang to unit, unit policy and expectations:  yes    Reviewed and explained all legal documents:  yes    Education for Fall Prevention and Restraints given: yes    Patient signed all legal documents yes   Pt verbalizes understanding:yes     Claudina Rubinstein Obtained? yes    Medical Bed:  Does patient require a medical bed? If answered yes for medical bed use, does the patient have the following conditions? High risk for falls? no   Obstructive sleep apnea? no   Oxygen Use? no   Use of assistive devices? NA   Other, (explain)? Identifies stressors. yes      homelessness. Protective Factors:  Patient identifies protective factors with nursing staff as follows: Identifies reasons for living: Yes   Supportive Social Network or family: No    Belief that suicide is immoral/high spirituality: Yes   Responsibility to family or others/living with family: No   Fear of death or dying due to pain and suffering: Yes   If Patient is unable to identify, reason why? Admission Note:    presented with suicidal ideation with thoughts to hang himself or jump out of traffic       Pt states that he lost his place to live and had suicidal thoughts to step into traffic or hang himself.       Electronically signed by Alden No RN on 12/1/22 at 6:26 PM CST

## 2022-12-02 NOTE — PROGRESS NOTES
Behavioral Health   Discharge Note  Bridge Appointment completed: Reviewed Discharge Instructions with patient. Patient verbalizes understanding and agreement with the discharge plan using the teachback method. Referral for Outpatient Tobacco Cessation Counseling, upon discharge (juana X if applicable and completed):    ( )  Hospital staff assisted patient to call Quit Line or faxed referral                                   during hospitalization                  ( )  Recognizing danger situations (included triggers and roadblocks), if not completed on admission                    ( )  Coping skills (new ways to manage stress, exercise, relaxation techniques, changing routine, distraction), if not completed on admission                                                           ( )  Basic information about quitting (benefits of quitting, techniques in how to quit, available resources, if not completed on admission  ( ) Referral for counseling faxed to [facility]   ( ) Patient refused referral  ( ) Patient refused counseling  ( x) Patient refused smoking cessation medication upon discharge    Vaccinations (juana X if applicable and completed):  ( ) Patient states already received influenza vaccine elsewhere  ( ) Patient received influenza vaccine during this hospitalization  ( x) Patient refused influenza vaccine at this time      Pt discharged with followings belongings:       Valuables retrieved from safe and returned to patient. Patient left department with self via taxi cab, discharged to [facility] in [place]. Patient education on aftercare instructions: yes  Patient verbalize understanding of AVS:  yes. Suicidal Ideations? No Risk of Suicide: Low Risk AVH? Denies HI?  Negative for homicidal ideation       Status EXAM upon discharge:  Mental Status and Behavioral Exam  Normal: No  Level of Assistance: Independent/Self  Facial Expression: Flat  Affect: Constricted, Blunt  Level of Consciousness: Alert  Frequency of Checks: 4 times per hour, close  Mood:Normal: No  Mood: Suspicious, Labile, Worthless, low self-esteem  Motor Activity:Normal: Yes  Motor Activity: Increased  Eye Contact: Poor  Observed Behavior: Agitated, Withdrawn, Impulsive, Cooperative, Guarded, Preoccupied  Sexual Misconduct History: Current - no  Preception: [place] to situation, [place] to place, [place] to time, [place] to person  Attention:Normal: No  Attention: Unable to concentrate  Thought Processes: Blocking, Circumstantial  Thought Content:Normal: No  Thought Content: Preoccupations, Poverty of content  Depression Symptoms: Isolative, Loss of interest, Sleep disturbance, Impaired concentration, Feelings of helplessness  Anxiety Symptoms: Generalized  Raysa Symptoms: No problems reported or observed. Hallucinations: None  Delusions: No  Memory:Normal: No  Memory: Poor recent, Poor remote  Insight and Judgment: No  Insight and Judgment: Poor judgment, Poor insight    AVS/Transition Record has been discussed with patient and a copy was given to the patient. The AVS/Transition Record was faxed to the next level of care today.

## 2022-12-02 NOTE — PLAN OF CARE
Problem: Anxiety  Goal: Will report anxiety at manageable levels  Description: INTERVENTIONS:  1. Administer medication as ordered  2. Teach and rehearse alternative coping skills  3. Provide emotional support with 1:1 interaction with staff  12/2/2022 1008 by Isreal Sow RN  Outcome: Progressing  12/1/2022 2312 by Diomedes Allison RN  Outcome: Progressing     Problem: Behavior  Goal: Pt/Family maintain appropriate behavior and adhere to behavioral management agreement, if implemented  Description: INTERVENTIONS:  1. Assess patient/family's coping skills and  non-compliant behavior (including use of illegal substances)  2. Notify security of behavior or suspected illegal substances which indicate the need for search of the family and/or belongings  3. Encourage verbalization of thoughts and concerns in a socially appropriate manner  4. Utilize positive, consistent limit setting strategies supporting safety of patient, staff and others  5. Encourage participation in the decision making process about the behavioral management agreement  6. If a visitor's behavior poses a threat to safety call refer to organization policy. 7. Initiate consult with , Psychosocial CNS, Spiritual Care as appropriate  12/2/2022 1008 by Isreal Sow RN  Outcome: Progressing  12/1/2022 2312 by Diomedes Allison RN  Outcome: Progressing     Problem: Self Harm/Suicidality  Goal: Will have no self-injury during hospital stay  Description: INTERVENTIONS:  1. Q 30 MINUTES: Routine safety checks  2. Q SHIFT & PRN: Assess risk to determine if routine checks are adequate to maintain patient safety  12/2/2022 1008 by Isreal Sow RN  Outcome: Progressing  12/1/2022 2312 by Diomedes Allison RN  Outcome: Progressing     Problem: Depression  Goal: Will be euthymic at discharge  Description: INTERVENTIONS:  1. Administer medication as ordered  2. Provide emotional support via 1:1 interaction with staff  3.  Encourage involvement in milieu/groups/activities  4. Monitor for social isolation  12/2/2022 1008 by Misael Roe RN  Outcome: Progressing  12/1/2022 2312 by Sandip Morataya RN  Outcome: Progressing     Problem: Sleep Disturbance  Goal: Will exhibit normal sleeping pattern  Description: INTERVENTIONS:  1. Administer medication as ordered  2. Decrease environmental stimuli, including noise, as appropriate  3. Discourage social isolation and naps during the day  12/2/2022 1008 by Misael Roe RN  Outcome: Progressing  12/1/2022 2312 by Sandip Morataya RN  Outcome: Progressing     Problem: Self Care Deficit  Goal: Return ADL status to a safe level of function  Description: INTERVENTIONS:  1. Administer medication as ordered  2. Assess ADL deficits and provide assistive devices as needed  3. Obtain PT/OT consults as needed  4.  Assist and instruct patient to increase activity and self care as tolerated  12/2/2022 1008 by Misael Roe RN  Outcome: Progressing  12/1/2022 2312 by Sandip Morataya RN  Outcome: Progressing     Problem: Pain  Goal: Verbalizes/displays adequate comfort level or baseline comfort level  12/2/2022 1008 by Misael Roe RN  Outcome: Progressing  12/1/2022 2312 by Sandip Morataya RN  Outcome: Progressing

## 2022-12-02 NOTE — DISCHARGE INSTR - DIET

## 2022-12-02 NOTE — DISCHARGE SUMMARY
Discharge Summary     Patient ID:  Rolande Peabody  [id number]  89 y.o.  1980    Admit date: 12/1/2022  Discharge date: 12/2/2022    Admitting Physician: Jonas Swenson MD   Attending Physician: No att. providers found  Discharge Provider: MICHAEL Gale     Discharge Diagnoses: Persistent mood (affective) disorder, unspecified, Tobacco use disorder, Homelessness    Admission Condition: fair    Discharged Condition: good    Indication for Admission: depression, suicidal ideation    HPI:  Patient is a 80-year-old  male who presented with suicidal ideation with thoughts to hang himself or jump out of traffic. Urine drug screen negative. Blood alcohol level negative. He has had prior psychiatric hospitalizations and prior suicide attempts. At this time he is lying in his bed and reported he has been homeless since the end of September. Reports he was kicked out of [employer] related to not being able to pay his rent. He was denied disability related to a prior back injury where he fractured his L5. Reports he has no support system at this time as well. He is not interested in any medications and reports that medications \"never help with anything. \"  Reports he has been on medications since his 35s and they have never helped him. He currently denies homicidal ideation and psychosis. Endorses poor energy, poor concentration, poor appetite and poor sleep. Reports he has been staying \"here and there. \"  Reports he is interested in going to a homeless shelter. He states, \"I am not going back to [facility] because all they do is treat you like a slave. \"  Reports she was very tired and asked if he could finish answering questions tomorrow      Hospital Course:   Patient was admitted to the adult behavioral health floor and was acclimated to the floor. Labs were reviewed and physical exam was completed by Dr. Lizett Brown and associates. Home medications were reconciled.  Belén Anthony was obtained and reviewed. Patient reports that he did not want any medication and was not going to attend group therapy during his hospitalization. He reported that he only said he was suicidal to get assistance getting into a homeless shelter as he is homeless. He refused to go to [facility] because he stated he was \"not going to be worked like a slave. \" Reported he had been there before and was never going back. Asked for assistance getting into another shelter. Social workers were able to get him a bed that was available in [place], [place]. Once he found out he had a bed he became excited to leave this unit. He denied ever being suicidal and again stated, \"I only said that to get help with a shelter. \" He reported he had no support system. Patient did not attend or participate in groups. Patient was calm and cooperative with staff and peers. Slept and ate during his hospitalization. Patient was sleeping through the night. This patient is not suicidal, homicidal or psychotic at discharge. He does not present a danger to self or others. On the day of discharge and transfer of care, patient indicated readiness for discharge. He was not acutely manic nor agitated with no reported symptoms of psychosis. He indicated no thoughts of self-harm or harm to others. Given resolution of presenting symptoms and patient readiness for discharge and that patient agreed to follow-up with outpatient services with [facility]. He denied access to firearms or weapons. He was provided a cab to [facility].        Number of antipsychotic medication prescribed at discharge: 0    Referral to addiction treatment: n/a    Prescription for Alcohol or Drug Disorder Medication: n/a    Prescription for Tobacco Cessation medication: pt declined    If no prescriptions for Tobacco Cessation must document why: pt declined    Consults: internal medicine    Significant Diagnostic Studies: labs:     Lab Results   Component Value Date    WBC 6.7 12/02/2022    HGB 13.6 (L) 12/02/2022    HCT 42.8 12/02/2022    MCV 96.8 (H) 12/02/2022     12/02/2022     Lab Results   Component Value Date/Time     12/02/2022 06:30 AM    K 4.5 12/02/2022 06:30 AM    K 3.7 09/07/2021 08:25 PM     12/02/2022 06:30 AM    CO2 26 12/02/2022 06:30 AM    BUN 14 12/02/2022 06:30 AM    CREATININE 0.7 12/02/2022 06:30 AM    GLUCOSE 102 12/02/2022 06:30 AM    CALCIUM 9.0 12/02/2022 06:30 AM      Lab Results   Component Value Date    TSHFT4 1.52 12/02/2022    TSH 1.600 10/26/2018     Lab Results   Component Value Date    VITD25 21.1 (L) 12/02/2022       Latest Reference Range & Units 12/2/22 06:30   Vitamin B-12 211 - 946 pg/mL 268      Latest Reference Range & Units 12/2/22 06:30   Albumin 3.5 - 5.2 g/dL 3.7   Alk Phos 40 - 130 U/L 69   ALT 5 - 41 U/L 20   AST 5 - 40 U/L 15   Bilirubin 0.2 - 1.2 mg/dL <0.2   Total Protein 6.6 - 8.7 g/dL 5.8 (L)   (L): Data is abnormally low      Treatments: therapies: RN and SW    Alert, Oriented X 4  Appearance:  Grooming and Hygiene attended to  Speech with Regular Rate and Rhythm  Eye Contact:  Good  No Psychomotor Agitation/Retardation Noted  Attitude:  Cooperative  Mood:  \"I am feeling better now that I have a place to stay when I leave. \"  Affective: Congruent, appropriate to the situation, with a normal range and intensity  Thought Processes:  Coherently communicated, logical and goal oriented  Thought Content: at this time No Suicidal Ideation, No Homicidal Ideation, No Auditory or Visual  Hallucinations, No Overt Delusions  Insight:  Present  Judgement:  Normal  Memory is intact for both remote and recent  Intellectual Functioning:  Within the [address] of Knowledge:  Adequate  Attention and Concentration:  Adequate        Discharge Exam:  Gait stable  Speaks in full sentences without shortness of air    Disposition: home    Patient Instructions:   Discharge Medication List as of 12/2/2022  9:27 AM STOP taking these medications       gabapentin (NEURONTIN) 300 MG capsule Comments:   Reason for Stopping: pt reports no longer having a prescription for this medication        DULoxetine (CYMBALTA) 20 MG extended release capsule Comments:   Reason for Stopping: pt reports no longer taking this medication        traZODone (DESYREL) 50 MG tablet Comments:   Reason for Stopping: pt reports no longer taking this medication            Activity: activity as tolerated  Diet: regular diet  Wound Care: none needed    Follow-up with   PCP in 2 weeks.     [facility] in one week     Time worked: Less than 30 minutes    Participation:good    Electronically signed by Kary Matute, PMHNP-BC, FNP-C on 12/2/2022 at 12:27 PM

## 2022-12-02 NOTE — PLAN OF CARE
Problem: Anxiety  Goal: Will report anxiety at manageable levels  Description: INTERVENTIONS:  1. Administer medication as ordered  2. Teach and rehearse alternative coping skills  3. Provide emotional support with 1:1 interaction with staff  Outcome: Progressing     Problem: Behavior  Goal: Pt/Family maintain appropriate behavior and adhere to behavioral management agreement, if implemented  Description: INTERVENTIONS:  1. Assess patient/family's coping skills and  non-compliant behavior (including use of illegal substances)  2. Notify security of behavior or suspected illegal substances which indicate the need for search of the family and/or belongings  3. Encourage verbalization of thoughts and concerns in a socially appropriate manner  4. Utilize positive, consistent limit setting strategies supporting safety of patient, staff and others  5. Encourage participation in the decision making process about the behavioral management agreement  6. If a visitor's behavior poses a threat to safety call refer to organization policy. 7. Initiate consult with , Psychosocial CNS, Spiritual Care as appropriate  Outcome: Progressing     Problem: Self Harm/Suicidality  Goal: Will have no self-injury during hospital stay  Description: INTERVENTIONS:  1. Q 30 MINUTES: Routine safety checks  2. Q SHIFT & PRN: Assess risk to determine if routine checks are adequate to maintain patient safety  Outcome: Progressing     Problem: Depression  Goal: Will be euthymic at discharge  Description: INTERVENTIONS:  1. Administer medication as ordered  2. Provide emotional support via 1:1 interaction with staff  3. Encourage involvement in milieu/groups/activities  4. Monitor for social isolation  Outcome: Progressing     Problem: Sleep Disturbance  Goal: Will exhibit normal sleeping pattern  Description: INTERVENTIONS:  1. Administer medication as ordered  2. Decrease environmental stimuli, including noise, as appropriate  3. Discourage social isolation and naps during the day  Outcome: Progressing     Problem: Self Care Deficit  Goal: Return ADL status to a safe level of function  Description: INTERVENTIONS:  1. Administer medication as ordered  2. Assess ADL deficits and provide assistive devices as needed  3. Obtain PT/OT consults as needed  4.  Assist and instruct patient to increase activity and self care as tolerated  Outcome: Progressing     Problem: Pain  Goal: Verbalizes/displays adequate comfort level or baseline comfort level  Outcome: Progressing
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
## 2022-12-03 NOTE — PROGRESS NOTES
SW attempted to contact pt to follow-up with him after he discharged from the unit yesterday to see if he had any questions or concerns that needed to be addressed. ASHLEY called the contact number listed for the pt and it said, \"the number you are trying to call is not reachable. \"

## 2023-05-27 NOTE — PLAN OF CARE
Group Therapy Note     Date: 9/8/2021  Start Time: 1100  End Time:  3630  Number of Participants: 7     Type of Group: Psychoeducation     Wellness Binder Information  Module Name:  emotional wellness  Session Number:  5     Patient's Goal:  obstacles to emotional wellness     Notes:  pt was verbally prompted to attend group. Pt refused. Information about obstacles to wellness was provided. Status After Intervention:       Participation Level:      Participation Quality:         Speech:           Thought Process/Content:         Affective Functioning:         Mood:         Level of consciousness:          Response to Learning:         Endings:      Modes of Intervention:         Discipline Responsible: Psychoeducational Specialist        Signature:  Aisha Seo No

## 2025-02-10 ENCOUNTER — HOSPITAL ENCOUNTER (INPATIENT)
Age: 45
LOS: 4 days | Discharge: HOME | End: 2025-02-14
Attending: PSYCHIATRY & NEUROLOGY | Admitting: PSYCHIATRY & NEUROLOGY
Payer: MEDICAID

## 2025-02-10 DIAGNOSIS — R45.851 SUICIDAL IDEATION: Primary | ICD-10-CM

## 2025-02-10 PROBLEM — F32.A DEPRESSION, UNSPECIFIED DEPRESSION TYPE: Status: ACTIVE | Noted: 2025-02-10

## 2025-02-10 LAB
ALBUMIN SERPL-MCNC: 4.9 G/DL (ref 3.5–5.2)
ALP SERPL-CCNC: 90 U/L (ref 40–129)
ALT SERPL-CCNC: 14 U/L (ref 5–41)
AMPHET UR QL SCN: NEGATIVE
ANION GAP SERPL CALCULATED.3IONS-SCNC: 15 MMOL/L (ref 8–16)
AST SERPL-CCNC: 17 U/L (ref 5–40)
BARBITURATES UR QL SCN: NEGATIVE
BASOPHILS # BLD: 0 K/UL (ref 0–0.2)
BASOPHILS NFR BLD: 0.1 % (ref 0–1)
BENZODIAZ UR QL SCN: NEGATIVE
BILIRUB SERPL-MCNC: 0.5 MG/DL (ref 0.2–1.2)
BUN SERPL-MCNC: 6 MG/DL (ref 6–20)
BUPRENORPHINE URINE: NEGATIVE
CALCIUM SERPL-MCNC: 9.9 MG/DL (ref 8.6–10)
CANNABINOIDS UR QL SCN: NEGATIVE
CHLORIDE SERPL-SCNC: 100 MMOL/L (ref 98–107)
CO2 SERPL-SCNC: 26 MMOL/L (ref 22–29)
COCAINE UR QL SCN: NEGATIVE
CREAT SERPL-MCNC: 0.8 MG/DL (ref 0.7–1.2)
DRUG SCREEN COMMENT UR-IMP: NORMAL
EOSINOPHIL # BLD: 0 K/UL (ref 0–0.6)
EOSINOPHIL NFR BLD: 0.5 % (ref 0–5)
ERYTHROCYTE [DISTWIDTH] IN BLOOD BY AUTOMATED COUNT: 11.9 % (ref 11.5–14.5)
ETHANOLAMINE SERPL-MCNC: <10 MG/DL (ref 0–10)
FENTANYL SCREEN, URINE: NEGATIVE
GLUCOSE SERPL-MCNC: 85 MG/DL (ref 70–99)
HCT VFR BLD AUTO: 49 % (ref 42–52)
HGB BLD-MCNC: 16 G/DL (ref 14–18)
IMM GRANULOCYTES # BLD: 0 K/UL
LYMPHOCYTES # BLD: 1.7 K/UL (ref 1.1–4.5)
LYMPHOCYTES NFR BLD: 20.4 % (ref 20–40)
MCH RBC QN AUTO: 30.8 PG (ref 27–31)
MCHC RBC AUTO-ENTMCNC: 32.7 G/DL (ref 33–37)
MCV RBC AUTO: 94.2 FL (ref 80–94)
METHADONE UR QL SCN: NEGATIVE
METHAMPHETAMINE, URINE: NEGATIVE
MONOCYTES # BLD: 0.6 K/UL (ref 0–0.9)
MONOCYTES NFR BLD: 7.6 % (ref 0–10)
NEUTROPHILS # BLD: 5.9 K/UL (ref 1.5–7.5)
NEUTS SEG NFR BLD: 71.3 % (ref 50–65)
OPIATES UR QL SCN: NEGATIVE
OXYCODONE UR QL SCN: NEGATIVE
PCP UR QL SCN: NEGATIVE
PLATELET # BLD AUTO: 291 K/UL (ref 130–400)
PMV BLD AUTO: 10.7 FL (ref 9.4–12.4)
POTASSIUM SERPL-SCNC: 4.1 MMOL/L (ref 3.5–5)
PROT SERPL-MCNC: 7.3 G/DL (ref 6.4–8.3)
RBC # BLD AUTO: 5.2 M/UL (ref 4.7–6.1)
SARS-COV-2 RDRP RESP QL NAA+PROBE: NOT DETECTED
SODIUM SERPL-SCNC: 141 MMOL/L (ref 136–145)
TRICYCLIC ANTIDEPRESSANTS, UR: NEGATIVE
WBC # BLD AUTO: 8.2 K/UL (ref 4.8–10.8)

## 2025-02-10 PROCEDURE — 99285 EMERGENCY DEPT VISIT HI MDM: CPT

## 2025-02-10 PROCEDURE — 80053 COMPREHEN METABOLIC PANEL: CPT

## 2025-02-10 PROCEDURE — 82077 ASSAY SPEC XCP UR&BREATH IA: CPT

## 2025-02-10 PROCEDURE — 87635 SARS-COV-2 COVID-19 AMP PRB: CPT

## 2025-02-10 PROCEDURE — 80307 DRUG TEST PRSMV CHEM ANLYZR: CPT

## 2025-02-10 PROCEDURE — 1240000000 HC EMOTIONAL WELLNESS R&B

## 2025-02-10 PROCEDURE — G0480 DRUG TEST DEF 1-7 CLASSES: HCPCS

## 2025-02-10 PROCEDURE — 6370000000 HC RX 637 (ALT 250 FOR IP): Performed by: PSYCHIATRY & NEUROLOGY

## 2025-02-10 PROCEDURE — 85025 COMPLETE CBC W/AUTO DIFF WBC: CPT

## 2025-02-10 RX ORDER — POLYETHYLENE GLYCOL 3350 17 G/17G
17 POWDER, FOR SOLUTION ORAL DAILY PRN
Status: DISCONTINUED | OUTPATIENT
Start: 2025-02-10 | End: 2025-02-14 | Stop reason: HOSPADM

## 2025-02-10 RX ORDER — NICOTINE 21 MG/24HR
1 PATCH, TRANSDERMAL 24 HOURS TRANSDERMAL DAILY
Status: DISCONTINUED | OUTPATIENT
Start: 2025-02-11 | End: 2025-02-14 | Stop reason: HOSPADM

## 2025-02-10 RX ORDER — TRAZODONE HYDROCHLORIDE 50 MG/1
50 TABLET ORAL NIGHTLY PRN
Status: DISCONTINUED | OUTPATIENT
Start: 2025-02-10 | End: 2025-02-11

## 2025-02-10 RX ORDER — ACETAMINOPHEN 325 MG/1
650 TABLET ORAL EVERY 4 HOURS PRN
Status: DISCONTINUED | OUTPATIENT
Start: 2025-02-10 | End: 2025-02-14 | Stop reason: HOSPADM

## 2025-02-10 RX ORDER — ASENAPINE 10 MG/1
5 TABLET SUBLINGUAL 2 TIMES DAILY
Status: DISCONTINUED | OUTPATIENT
Start: 2025-02-10 | End: 2025-02-11

## 2025-02-10 RX ORDER — HYDROXYZINE HYDROCHLORIDE 25 MG/1
25 TABLET, FILM COATED ORAL 3 TIMES DAILY PRN
Status: DISCONTINUED | OUTPATIENT
Start: 2025-02-10 | End: 2025-02-11

## 2025-02-10 RX ORDER — POLYETHYLENE GLYCOL 3350 17 G
2 POWDER IN PACKET (EA) ORAL
Status: DISCONTINUED | OUTPATIENT
Start: 2025-02-10 | End: 2025-02-14 | Stop reason: HOSPADM

## 2025-02-10 RX ADMIN — HYDROXYZINE HYDROCHLORIDE 25 MG: 25 TABLET, FILM COATED ORAL at 22:04

## 2025-02-10 RX ADMIN — NICOTINE POLACRILEX 2 MG: 2 LOZENGE ORAL at 22:04

## 2025-02-10 RX ADMIN — TRAZODONE HYDROCHLORIDE 50 MG: 50 TABLET ORAL at 22:04

## 2025-02-10 RX ADMIN — ASENAPINE MALEATE 5 MG: 10 TABLET SUBLINGUAL at 22:07

## 2025-02-10 ASSESSMENT — PATIENT HEALTH QUESTIONNAIRE - PHQ9
7. TROUBLE CONCENTRATING ON THINGS, SUCH AS READING THE NEWSPAPER OR WATCHING TELEVISION: MORE THAN HALF THE DAYS
10. IF YOU CHECKED OFF ANY PROBLEMS, HOW DIFFICULT HAVE THESE PROBLEMS MADE IT FOR YOU TO DO YOUR WORK, TAKE CARE OF THINGS AT HOME, OR GET ALONG WITH OTHER PEOPLE: VERY DIFFICULT
1. LITTLE INTEREST OR PLEASURE IN DOING THINGS: MORE THAN HALF THE DAYS
5. POOR APPETITE OR OVEREATING: SEVERAL DAYS
SUM OF ALL RESPONSES TO PHQ QUESTIONS 1-9: 17
SUM OF ALL RESPONSES TO PHQ QUESTIONS 1-9: 17
3. TROUBLE FALLING OR STAYING ASLEEP: SEVERAL DAYS
4. FEELING TIRED OR HAVING LITTLE ENERGY: SEVERAL DAYS
6. FEELING BAD ABOUT YOURSELF - OR THAT YOU ARE A FAILURE OR HAVE LET YOURSELF OR YOUR FAMILY DOWN: MORE THAN HALF THE DAYS
SUM OF ALL RESPONSES TO PHQ QUESTIONS 1-9: 15
9. THOUGHTS THAT YOU WOULD BE BETTER OFF DEAD, OR OF HURTING YOURSELF: MORE THAN HALF THE DAYS
2. FEELING DOWN, DEPRESSED OR HOPELESS: NEARLY EVERY DAY
8. MOVING OR SPEAKING SO SLOWLY THAT OTHER PEOPLE COULD HAVE NOTICED. OR THE OPPOSITE, BEING SO FIGETY OR RESTLESS THAT YOU HAVE BEEN MOVING AROUND A LOT MORE THAN USUAL: NEARLY EVERY DAY
SUM OF ALL RESPONSES TO PHQ QUESTIONS 1-9: 17
SUM OF ALL RESPONSES TO PHQ9 QUESTIONS 1 & 2: 5

## 2025-02-10 ASSESSMENT — SLEEP AND FATIGUE QUESTIONNAIRES
DO YOU HAVE DIFFICULTY SLEEPING: YES
SLEEP PATTERN: NIGHTMARES/TERRORS;DISTURBED/INTERRUPTED SLEEP
DO YOU USE A SLEEP AID: NO
AVERAGE NUMBER OF SLEEP HOURS: 3

## 2025-02-10 ASSESSMENT — ENCOUNTER SYMPTOMS
SHORTNESS OF BREATH: 0
COLOR CHANGE: 0
EYE ITCHING: 0
PHOTOPHOBIA: 0
COUGH: 0
EYE DISCHARGE: 0
APNEA: 0
BACK PAIN: 0

## 2025-02-10 ASSESSMENT — LIFESTYLE VARIABLES
HOW OFTEN DO YOU HAVE A DRINK CONTAINING ALCOHOL: MONTHLY OR LESS
HOW MANY STANDARD DRINKS CONTAINING ALCOHOL DO YOU HAVE ON A TYPICAL DAY: PATIENT DOES NOT DRINK

## 2025-02-10 NOTE — ED PROVIDER NOTES
[facility] EMERGENCY DEPARTMENT  eMERGENCYdEPARTMENT eNCOUnter      Pt Name: Geo Young  MRN: [id number]  Birthdate 1980  Date of evaluation: 2/10/2025  Provider:MIGUEL ÁNGEL Paul    CHIEF COMPLAINT       Chief Complaint   Patient presents with    Mental Health Problem     SI with plans, will not elaborate;  HI at times, endorses possible hallucinations         HISTORY OF PRESENT ILLNESS  (Location/Symptom, Timing/Onset, Context/Setting, Quality, Duration, Modifying Factors, Severity.)   Geo Young is a 45 y.o. male who presents to the emergency department with complaints of suicidal ideation worsening depression he does not have a specific plan when I ask him directly he has homicidal thoughts at times but denies that to me at present.  Possibly some auditory hallucinations states sometimes he feels like he hears things.  The patient has a history of diagnosed persistent mood affective disorder unspecified multiple bouts of suicidal ideation with major depression he tells me he is then admitted here before and that he has been on multiple medications \"that do not usually help.\"  Patient denies any medical symptoms to me he is voluntary for admission.  He denies that he is actively homeless right now.  Most recently admitted here in 2022. Denies any specific trigger for today's visit.     HPI    Nursing Notes were reviewed and I agree.    REVIEW OF SYSTEMS    (2-9 systems for level 4, 10 or more for level 5)     Review of Systems   Constitutional:  Negative for activity change, appetite change, chills and fever.   HENT:  Negative for congestion and dental problem.    Eyes:  Negative for photophobia, discharge and itching.   Respiratory:  Negative for apnea, cough and shortness of breath.    Cardiovascular:  Negative for chest pain.   Musculoskeletal:  Negative for arthralgias, back pain, gait problem, myalgias and neck pain.   Skin:  Negative for color change, pallor and rash.   Neurological:  Negative for

## 2025-02-11 PROBLEM — F39 UNSPECIFIED MOOD (AFFECTIVE) DISORDER (HCC): Status: ACTIVE | Noted: 2025-02-11

## 2025-02-11 PROCEDURE — 6370000000 HC RX 637 (ALT 250 FOR IP): Performed by: PSYCHIATRY & NEUROLOGY

## 2025-02-11 PROCEDURE — 99223 1ST HOSP IP/OBS HIGH 75: CPT | Performed by: PSYCHIATRY & NEUROLOGY

## 2025-02-11 PROCEDURE — 1240000000 HC EMOTIONAL WELLNESS R&B

## 2025-02-11 RX ORDER — MECOBALAMIN 5000 MCG
5 TABLET,DISINTEGRATING ORAL NIGHTLY
Status: DISCONTINUED | OUTPATIENT
Start: 2025-02-11 | End: 2025-02-14 | Stop reason: HOSPADM

## 2025-02-11 RX ORDER — HYDROXYZINE HYDROCHLORIDE 25 MG/1
50 TABLET, FILM COATED ORAL 3 TIMES DAILY PRN
Status: DISCONTINUED | OUTPATIENT
Start: 2025-02-11 | End: 2025-02-12

## 2025-02-11 RX ORDER — DOXEPIN HYDROCHLORIDE 25 MG/1
25 CAPSULE ORAL NIGHTLY PRN
Status: DISCONTINUED | OUTPATIENT
Start: 2025-02-11 | End: 2025-02-14 | Stop reason: HOSPADM

## 2025-02-11 RX ORDER — DOXEPIN HYDROCHLORIDE 50 MG/1
50 CAPSULE ORAL NIGHTLY
Status: DISCONTINUED | OUTPATIENT
Start: 2025-02-11 | End: 2025-02-14 | Stop reason: HOSPADM

## 2025-02-11 RX ADMIN — NICOTINE POLACRILEX 2 MG: 2 LOZENGE ORAL at 21:16

## 2025-02-11 RX ADMIN — HYDROXYZINE HYDROCHLORIDE 50 MG: 25 TABLET, FILM COATED ORAL at 18:36

## 2025-02-11 RX ADMIN — DOXEPIN HYDROCHLORIDE 50 MG: 50 CAPSULE ORAL at 20:36

## 2025-02-11 RX ADMIN — HYDROXYZINE HYDROCHLORIDE 50 MG: 25 TABLET, FILM COATED ORAL at 09:35

## 2025-02-11 RX ADMIN — Medication 5 MG: at 20:36

## 2025-02-11 NOTE — H&P
Department of Psychiatry  History and Physical - Adult         CHIEF COMPLAINT: Depression, suicidal ideations, homicidal ideations    History obtained from:  patient    HISTORY OF PRESENT ILLNESS:          The patient is a 45 y.o. male with previous psychiatric history of depression, anxiety, alcohol use disorder, stimulant use disorder in remission, hallucinogen use disorder in remission, cannabis use disorder in remission, opioid use disorder in remission, history of treatment noncompliance, who has been admitted to our psychiatric unit secondary to treatment noncompliance, worsening of depression, intermittent suicidal and homicidal ideations.    Patient is well-known to psychiatry due to previous admission to our psychiatric unit 2 years ago with the same clinical presentation, as at present time.    Patient has been seen in treatment team room with presence of the patient's nurse.  Patient reported that he was not taking any of prescribed psychotropic medications for 2 years and felt good, however, stated that 6 weeks ago he started experiencing feeling of depression, stopped going to work and had intermittent suicidal and homicidal ideations.  He reported that during the last 6 weeks he was experiencing episodes of anxiety as well, and stayed at home.  He reported that he continues to drink alcohol and last time was drinking alcohol 1 week ago, stated that he drinks usually 2-4 beers.  Patient stated that he decided to come to the hospital to restart previously prescribed psychotropic medications.    In regards of affective symptomatology, patient endorses feeling of depression, anxiety and decreased quality of sleep.  He endorses fair appetite, reported decreased motivation and decreased concentration. He denies any mood swings or racing thoughts.  He denies feeling of hopelessness, helplessness and worthlessness.  Patient denies current active suicidal and homicidal ideations, denies any plans.  He denies  associates  -Acclimate to the unit.   -Encourage participation in groups and therapeutic activities as appropriate.  -Medications:    will start patient on doxepin 50 mg and melatonin 5 mg at bedtime for insomnia  Atarax 25 mg 3 times a day as needed for anxiety  Patient will be provided with nicotine patch 21 mg / 24 hours and nicotine lozenges 2 mg every hour as needed for smoking cessation    -The risks, benefits, side effects, indications, contraindications, and adverse effects of the medications have been discussed.  -The patient has verbalized understanding and has capacity to give informed consent.  -SW help evaluating home environment.   -Discuss with treatment team.

## 2025-02-11 NOTE — PROGRESS NOTES
Patient is not sleeping well this shift. He reports that he just does not feel right. He did not want to try more medication this morning to help him sleep. He reported that he is willing to try something else the next night.         Electronically signed by Marisa Nelson RN on 2/11/2025 at 1:39 AM

## 2025-02-11 NOTE — PROGRESS NOTES
Admission Note      Reason for admission/Target Symptom: Per nursing admission assessment - Reason for Admission: Geo Young is a 45 year old male that came to the ED with increasing depression and anxiety. He reports struggling with anxiety and depression the last thirty years. He reports that he has been thinking about killing himself the last six weeks. He reports constant thoughts of SI. He reports that he has made plans of how and had a couple of attempts. He reports taking sleeping pills a couple weeks ago and drank alcohol in hopes of not waking up. He reports leaving a propane heater running inside the house overnight that is meant for outside. He reports while walking to the hospital tonight that he had thoughts to get ran over by a car. He reports feeling hopeless, helpless worthless, just overwhelmed. He reports racing thoughts and trouble sleeping. Reports that he has been dealing with AVH the last 20 + years.    Diagnoses: unspecified depression  UDS: neg  BAL:  neg    SW will meet with treatment team to discuss patient's treatment including care planning, discharge planning, and follow-up needs. Patient has been admitted to [facility].     Treatment team will identify the patient's discharge needs. Appointments will be made for medication management and outpatient therapy/counseling. Pt confirmed the need for ongoing treatment post inpatient stay. Pt was also provided a handout of contact information for drug and alcohol treatment centers and other community support service such as [facility], AA, and [facility].

## 2025-02-11 NOTE — PROGRESS NOTES
Patient did get some rest this morning. Patient had trouble falling asleep.       Electronically signed by Marisa Nelson RN on 2/11/2025 at 7:22 AM

## 2025-02-11 NOTE — GROUP NOTE
Group Therapy Note    Date: 2/11/2025    Group Start Time: 0950  Group End Time: 1025  Group Topic: Psychoeducation    [facility] ADULT [facility]    Margarita Copeland           Patient's Goal:  Managing Stress    Notes: SW discussed how too much stress can be overwhelming and the pts shared some of their biggest stressors currently, symptoms they experience when they are overly stressed, who they have for social support, and how they can have more of a life balance.     Status After Intervention:  Improved    Participation Level: Active Listener and Interactive    Participation Quality: Appropriate, Attentive, and Sharing      Speech:  normal      Thought Process/Content: Logical      Affective Functioning: Congruent      Mood: euthymic      Level of consciousness:  Alert, Oriented x4, and Attentive      Response to Learning: Able to verbalize current knowledge/experience and Able to verbalize/acknowledge new learning      Endings: None Reported    Modes of Intervention: Support, Exploration, and Clarifying      Discipline Responsible: Psychoeducational Specialist      Signature:  Margarita Copeland

## 2025-02-11 NOTE — PROGRESS NOTES
SW spoke with pt about a safe discharge plan for when he is ready to leave the hospital, and he reports that he will return home where he lives alone in [place], and he said he may need assistance with transport, because he stated that he walked here upon admission. Pt said he had been to [facility] previously, and would prefer to go to [facility] instead. SW noticed that the pt does not have health insurance currently, and explained that [facility] will not schedule appointments for someone without insurance coverage. SW told him that for now, we would have to make his appointments at [facility], but told him that someone from public benefits should be up to speak with him today and assist him in trying to get Medicaid coverage. SW told him that once he has insurance, he can switch over the [facility] if that is what he would prefer. Pt was agreeable.

## 2025-02-11 NOTE — PROGRESS NOTES
Nursing Admission Note    Reason for Admission: Geo Young is a 45 year old male that came to the ED with increasing depression and anxiety. He reports struggling with anxiety and depression the last thirty years. He reports that he has been thinking about killing himself the last six weeks. He reports constant thoughts of SI. He reports that he has made plans of how and had a couple of attempts. He reports taking sleeping pills a couple weeks ago and drank alcohol in hopes of not waking up. He reports leaving a propane heater running inside the house overnight that is meant for outside. He reports while walking to the hospital tonight that he had thoughts to get ran over by a car. He reports feeling hopeless, helpless worthless, just overwhelmed. He reports racing thoughts and trouble sleeping. Reports that he has been dealing with AVH the last 20 + years.    Additional Notes:  Patient reports that he is not allergic to anything that he knows of but reports that he gets blotches from it. Patient doesn't know exactly what it is. Patient reports that he has been off work for 6 weeks due to his anxiety. He reports that he has been turned down for disability. Patient has hearing issues that he relates to his previous work of being a .     Patient Active Problem List   Diagnosis    Substance induced mood disorder (HCC)    Suicidal ideation    Suicidal ideations    Major depression, recurrent (HCC)    Tobacco use disorder    Severe episode of recurrent major depressive disorder, without psychotic features (HCC)    Homicidal ideations    Persistent mood (affective) disorder, unspecified (HCC)    Homelessness    Depression, unspecified depression type       C-SSRS:  C-SSRS Completed: yes.    Risk Assessment Completed: yes.    Risk of Suicide per C-SSRS: Risk of Suicide: High Risk  Provider Notified of the C-SSRS Screening and   Risk Assessment Findings: yes.    Order for Constant Observer Continued: no.    If  suffering: Yes   Engaged in work or school: No               If Patient is unable to identify, reason why? Patient said that he would be grasping.     PATIENT STRENGTHS and Barriers:   Patient Strengths/Barriers  Strengths (Must Choose Two): Motivation level for treatment, Previous positive response to treatment  Barriers: Recreational/leisure/hobbies, Spirituality, Technical/vocation    Medical Problems:   Past Medical History:   Diagnosis Date    Anxiety     Depression     Panic attack     Roni Mountain spotted fever 2005       Medical Bed:  Does patient require a medical bed? no  If answered yes for medical bed use, does the patient have the following conditions?   High risk for falls? no   Obstructive sleep apnea? no   Oxygen Use? no   Use of assistive devices? no   Other, (explain)?     Metabolic Screening:  Lab Results   Component Value Date    LABA1C 5.7 11/18/2018     Lab Results   Component Value Date    CHOL 235 (H) 11/18/2018     Lab Results   Component Value Date    TRIG 84 11/18/2018     Lab Results   Component Value Date    HDL 52 (L) 11/18/2018     No components found for: \"LDLCAL\"  No components found for: \"LABVLDL\"  Body mass index is 23.43 kg/m².  BP Readings from Last 2 Encounters:   02/10/25 123/86   12/02/22 116/75       Tobacco Screening:  Practical Counseling, on admission, juana X, if applicable and completed (first 3 are required if patient doesn't refuse):            Recognizing danger situations (included triggers and roadblocks)   yes              Coping skills (new ways to manage stress, exercise, relaxation techniques, changing routine, distraction  yes                                                    Basic information about quitting (benefits of quitting, techniques in how to quit, available resources no  Referral for counseling [facility]ed to [facility]     no                                       Patient refused counseling yes  Patient has not smoked in the last 30 days

## 2025-02-11 NOTE — DISCHARGE INSTRUCTIONS
Counseling   [address] [place] [address]   [email or web address]   [phone]   Daily 8:00AM-8:00PM, Walk Ins Welcome  Free    [facility]  [address], [place], [place] [address]  Phone: [phone]  [email or web address]    [phone]   General Behavioral Health:  Medication Assisted Treatment for Opioid Use Disorders; MRT   Patients under 18 years old accepted   Accepts Medicaid; Medicare; Private Insurance; Sliding Scale    [facility]   [address], [facility] [address]   [email or web address]   [phone]   Walk In Available on Friday  Accepts Private Insurance, Medicaid  [facility]  [address]; [place] [address]  [phone] option 8  [email or web address]   [phone]  Patients under 18 years old accepted    Accepts Medicaid, Medicare, Most Private Insurances  [facility]  [address] [place], [place] [address]  Phone: [phone]  [email or web address]    [phone]   General Behavioral Health:  Medication Assisted Treatment for Opioid Use Disorders; MRT   Patients under 18 years old accepted   Accepts Medicaid; Medicare; Private Insurance; Sliding Scale  [address] [address]   [email or web address]   [phone]   Individual therapy for children and adults, couples and family therapy, and evaluations for learning disabilities and ADHD. Walk-ins accepted  Patients under 18 years old accepted  Income based sliding fee scale    [facility]  [address], [place], [place] [address]  Phone: [phone]  [email or web address]    [phone]   General Behavioral Health:  Medication Assisted Treatment for Opioid Use Disorders; MRT   Patients under 18 years old accepted   Accepts Medicaid; Medicare; Private Insurance; Sliding  [address]  [phone]  [facility]   [phone]  [facility]   [phone]    [facility]   Typically serve a daily meal and serve as point of contact for Meals on Wheels program  [facility]  [address] [place], [place] [address] [phone]    Food Pantry  Food distribution. Most require person to bring ID/documentation. Contact for information.    [facility]  [address] [place], [place] [address] [phone]  [facility]  [address] [address] [place], [place] [address] [phone]     [facility]  Typically serve a daily lunch during the week- contact for meal times.  [facility] [facility]  [address] [place], [place] [address]  [phone]  Serving a free lunch from 11:00 AM to 1:00 PM Monday through Friday. Please call to see if meal delivery is an option.    Food Pantry  Food distribution. Most require person to bring ID/documentation. Contact for information.    [address] [address]  [phone]  [facility]  [address] [place], [place] [address]  [phone]  [facility]  [address] [place] [address]  [phone]  [facility]  [address] [place] [address]  [phone]  [facility]  [address] [place], [place] [address]  [phone]    [employer] [facility]  Community supported miniature pantry filled with non-perishables. Take what you need.   [facility]   (located on walkway from patient parking to office entrance)  [address] [place], [place] [address]    [facility]   Utility - Financial Resources*  (Call 211/[facility] for additional resources)    Utility:  Low Income Home Energy Assistance Program (LIHEAP)  Federally-funded program to help eligible, low-income households meet their heating/cooling needs.

## 2025-02-11 NOTE — PROGRESS NOTES
Behavioral Services  Medicare Certification Upon Admission    I certify that this patient's inpatient psychiatric hospital admission is medically necessary for:    [x] (1) Treatment which could reasonably be expected to improve this patient's condition,       [x] (2) Or for diagnostic study;     AND     [x](2) The inpatient psychiatric services are provided while the individual is under the care of a physician and are included in the individualized plan of care.    Estimated length of stay/service 3-5 days    Plan for post-hospital care TBD    Electronically signed by GAIL JOSHI MD on 2/11/2025 at 7:56 AM

## 2025-02-11 NOTE — ED NOTES
ED TO INPATIENT SBAR HANDOFF    Patient Name: Geo Young   : 1980  45 y.o.   Family/Caregiver Present: No  Code Status Order: Prior    C-SSRS: Risk of Suicide: High Risk  Sitter Yes  Restraints:         Situation  Chief Complaint:   Chief Complaint   Patient presents with    Mental Health Problem     SI with plans, will not elaborate;  HI at times, endorses possible hallucinations     Patient Diagnosis: No admission diagnoses are documented for this encounter.     Brief Description of Patient's Condition: Provider Note:     Mental Status: Natasha Young is a 45 y.o. male who presents to the emergency department with complaints of suicidal ideation worsening depression he does not have a specific plan when I ask him directly he has homicidal thoughts at times but denies that to me at present.  Possibly some auditory hallucinations states sometimes he feels like he hears things.  The patient has a history of diagnosed persistent mood affective disorder unspecified multiple bouts of suicidal ideation with major depression he tells me he is then admitted here before and that he has been on multiple medications \"that do not usually help.\"  Patient denies any medical symptoms to me he is voluntary for admission.  He denies that he is actively homeless right now.  Most recently admitted here in . Denies any specific trigger for today's visit.   Arrived from: home    Imaging:   No orders to display     COVID-19 Results:   Internal Administration   First Dose      Second Dose           Last COVID Lab SARS-CoV-2, NAAT (no units)   Date Value   02/10/2025 Not Detected           Abnormal labs:   Abnormal Labs Reviewed   CBC WITH AUTO DIFFERENTIAL - Abnormal; Notable for the following components:       Result Value    MCV 94.2 (*)     MCHC 32.7 (*)     Neutrophils % 71.3 (*)     All other components within normal limits     Background  Allergies: No Known Allergies  Current Medications:     History:   Past  Medical History:   Diagnosis Date    Anxiety     Depression     Panic attack     Roni Mountain spotted fever 2005       Assessment  Vitals: Level of Consciousness: Alert (0)   Vitals:    02/10/25 1632 02/10/25 2015   BP: (!) 124/94 (!) 127/92   Pulse: (!) 105 86   Resp: 16 18   Temp: 97.6 °F (36.4 °C) 97.1 °F (36.2 °C)   TempSrc: Oral Oral   SpO2: 97% 99%   Weight: 81.6 kg (180 lb)    Height: 1.803 m (5' 11\")      Predictive Model Details   No score data available for Deterioration Index      NPO? No  O2 Flow Rate: O2 Device: None (Room air)    Cardiac Rhythm:   NIH Score: NIH     Active LDA's:    Pertinent or High Risk Medications/Drips: no   If Yes, please provide details:   Blood Product Administration: no  If Yes, please provide details:  Sepsis Risk Score      Admitted with Sepsis? No    Recommendation  Incomplete orders:   Patient Belongings:   Additional Comments:   If any further questions, please call Sending RN at [phone]    Electronically signed by: Electronically signed by Judy Zapata RN on 2/10/2025 at 8:20 PM

## 2025-02-11 NOTE — PROGRESS NOTES
go to work and struggling to keep household upkeep done.   Has patient been completing ADL's: no    Mental Status Evaluation:     Appearance:  disheveled and thin & gaunt looking, wearing glasses   Behavior:  Restless & fidgety   Speech:  normal pitch and normal volume   Mood:  anxious, depressed, dysthymic, and sad   Affect:  mood-congruent   Thought Process:  circumstantial   Thought Content:  hallucinations and suicidal   Sensorium:  person, place, time/date, situation, day of week, month of year, and year   Cognition:  grossly intact   Insight:  impaired          Psychiatric Review Of Systems:     Recent Sleep changes: yes.  Admits to difficulty sleeping and only sleeping for short periods of time. Admits to racing thoughts.    Recent appetite changes: yes, no energy or interest in food.   Recent weight changes/Pounds gained (+) or lost (-): does not know.      Current living arrangement:Alone in his own home   Current Support System:denies having  Employment:Yes, but has been missing work and is unsure if his employment will be continued.     Psychiatric Hospitalizations: Yes   Where & When: This facility; 12/2022, 9/2021, twice in 2018.  At [facility] in 9/2021.  Outpatient Psychiatric Treatment:    Family History:    Family history of mental illness: yes, paternal side with depression and anxiety.    \"Depression\",\"Anxiety\",\"Bipolar\",\"Schizophrenia\",\"Borderline\",\"ADHD\"}  Family members with suicide attempt: yes< father attempted, did not complete.   If yes explain (attempted or completed):    Substance Abuse History:     SBIRT Completed: yes  Brief Intervention completed if needed:       ETOH Usage: <10    Amount drinking daily: denied    Date of last drink: Reports has 4-6 beers per month.   Longest period of sobriety:    Substance/Chemical Abuse/Recreational Drug History:  Substance used: Remote h/o hallucinogen use.  Date of last substance use: 20 years ago  Tobacco Use: yes   History of rehab  Barriers of the safety plan with patient: N/A - Patient admitted to Behavioral Health Unit    Nely Guerrero RN

## 2025-02-11 NOTE — PROGRESS NOTES
Comprehensive Nutrition Assessment    Type and Reason for Visit:  Initial, Positive nutrition screen    Nutrition Recommendations/Plan:   Continue POC     Nutrition Assessment:    Pt is adequatley nourished. PO intake of meals is good at this time. BMP is WNL. Pt is sent Ensure ONS TID to help meet nutritional needs. Continue POC.    Current Nutrition Intake & Therapies:    Average Meal Intake: %  ADULT ORAL NUTRITION SUPPLEMENT; Breakfast, Lunch, Dinner; Standard High Calorie/High Protein Oral Supplement  ADULT DIET; Regular; Safety Tray; Safety Tray (Disposables)    Anthropometric Measures:  Height: 180.3 cm (5' 11\")  Ideal Body Weight (IBW): 172 lbs (78 kg)    Current Body Weight: 76.2 kg (167 lb 15.9 oz)  Current BMI (kg/m2): 23.4  BMI Categories: Normal Weight (BMI 18.5-24.9)    Nutrition Diagnosis:   No nutrition diagnosis at this time     Nutrition Interventions:   Food and/or Nutrient Delivery: Continue Current Diet, Continue Oral Nutrition Supplement  Coordination of Nutrition Care: Continue to monitor while inpatient    Goals:  Goals: PO intake 75% or greater, Meet at least 75% of estimated needs    Nutrition Monitoring and Evaluation:   Food/Nutrient Intake Outcomes: Food and Nutrient Intake, Supplement Intake  Physical Signs/Symptoms Outcomes: Biochemical Data, Weight    Alla Gonsalez, MS, RD, LD  Contact: [phone]

## 2025-02-11 NOTE — PROGRESS NOTES
[facility] Adult Unit Daily Assessment  Nursing Progress Note    Room: [facility]611-01   Name: Geo Young   Age: 45 y.o.   Gender: male   Dx: Depression, unspecified depression type  Precautions: suicide risk  Inpatient Status: voluntary     SLEEP:  Sleep Quality Poor  Sleep Medications: No   PRN Sleep Meds: No     MEDICAL:  Other PRN Meds: Yes   Med Compliant: Yes  Accu-Chek: No  Oxygen/CPAP/BiPAP: No  CIWA/CINA: No   PAIN Assessment: none  Side Effects from medication: No    Metabolic Screening:  Lab Results   Component Value Date    LABA1C 5.7 11/18/2018     Lab Results   Component Value Date    CHOL 235 (H) 11/18/2018     Lab Results   Component Value Date    TRIG 84 11/18/2018     Lab Results   Component Value Date    HDL 52 (L) 11/18/2018     No components found for: \"LDLCAL\"  No components found for: \"LABVLDL\"  Body mass index is 23.43 kg/m².  BP Readings from Last 2 Encounters:   02/11/25 108/76   12/02/22 116/75       Medical Bed:   Is patient in a medical bed? no   If medical bed is in use, has nursing secured room while patient is awake and out of the room? NA  Has safety checks by nursing been completed on the bed/room this shift? NA    Protective Factors:  Patient identifies protective factors with nursing staff as follows:   Identifies reasons for living: Yes   Supportive Social Network or family: No    Belief that suicide is immoral/high spirituality: No   Responsibility to family or others/living with family: No   Fear of death or dying due to pain and suffering: No   Engaged in work or school: No  If Patient is unable to identify, reason why? Says he lives for \"hope\" but has no daija in hope because his depression makes everything go wrong.     Depression: 5   Anxiety: 7   SI denies suicidal ideation, with plan multiple plans and without current intent. Patient contracts for safety.       HI Negative for homicidal ideation        AVH:yes If Hallucinations are present, describe? Patient sees vapor trails and

## 2025-02-11 NOTE — PROGRESS NOTES
Spiritual Health History and Assessment/Progress Note  [facility]    Loneliness/Social Isolation,  ,  , Initial Encounter    Name: Geo Young MRN: [id number]    Age: 45 y.o.     Sex: male   Language: English   Jain: Muslim   Depression, unspecified depression type     Date: 2/11/2025            Total Time Calculated: 22 min              Spiritual Assessment began in [facility] ADULT [facility]        Referral/Consult From: Rounding   Encounter Overview/Reason: Loneliness/Social Isolation  Service Provided For: Patient    Ivette, Belief, Meaning:   Patient Other: Pt shared - lived for 39 years with mental issues; built life and lost them.  Family/Friends No family/friends present      Importance and Influence:  Patient Other: Pt shared - this time around, he felt powerless, not understood.    Family/Friends No family/friends present    Community:  Patient expresses feelings of isolation: feeling there is no one to turn to for help  Family/Friends No family/friends present    Assessment and Plan of Care:     Patient Interventions include: Facilitated expression of thoughts and feelings and Explored spiritual coping/struggle/distress; point of distress-how to break the cycle: feeling good, then bad & now - a longer spell of bad; and he feels tired.    Family/Friends Interventions include: No family/friends present    Patient Plan of Care: Other: Pt expressed - being in this facility helps to have a feeling of safety - even for a little while.    Family/Friends Plan of Care: No family/friends present    Electronically signed by TEMO Siddiqui on 2/11/2025 at 12:53 PM        02/11/25 1248   Encounter Summary   Encounter Overview/Reason Loneliness/Social Isolation   Encounter Code  Assessment by  services   Service Provided For Patient   Referral/Consult From Rounding   Complexity of Encounter High   Begin Time 1228   End Time  1250   Total Time Calculated 22 min   Spiritual/Emotional needs    Type Spiritual Support   Behavioral Health    Type  Initial Encounter   Assessment/Intervention/Outcome   Assessment Powerlessness  (Pt talked about his health issues)   Intervention Active listening;Sustaining Presence/Ministry of presence  (Let patient talk, talk...)   Outcome Engaged in conversation;Encouraged  (Pt stated - this facility is a safe place to be, even for a short while.)   Plan and Referrals   Plan/Referrals No future visits requested   Does the patient have a [facility] PCP? Yes    to follow up after discharge? No     Electronically signed by IAM Siddiqui on 2/11/2025 at 12:59 PM

## 2025-02-11 NOTE — PLAN OF CARE
Problem: Safety - Adult  Goal: Free from fall injury  Outcome: Progressing     Problem: Self Harm/Suicidality  Goal: Will have no self-injury during hospital stay  Description: INTERVENTIONS:  1.  Ensure constant observer at bedside with Q15M safety checks  2.  Maintain a safe environment  3.  Secure patient belongings  4.  Ensure family/visitors adhere to safety recommendations  5.  Ensure safety tray has been added to patient's diet order  6.  Every shift and PRN: Re-assess suicidal risk via Frequent Screener    Outcome: Progressing  Flowsheets (Taken 2/11/2025 1155)  Will have no self-injury during hospital stay:   Ensure constant observer at bedside with Q15M safety checks   Maintain a safe environment   Secure patient belongings   Ensure family/visitors adhere to safety recommendations   Ensure safety tray has been added to patient's diet order   Every shift and PRN: Re-assess suicidal risk via Frequent Screener     Problem: Depression  Goal: Will be euthymic at discharge  Description: INTERVENTIONS:  1. Administer medication as ordered  2. Provide emotional support via 1:1 interaction with staff  3. Encourage involvement in milieu/groups/activities  4. Monitor for social isolation  Outcome: Progressing     Problem: Psychosis  Goal: Will report no hallucinations or delusions  Description: INTERVENTIONS:  1. Administer medication as  ordered  2. Assist with reality testing to support increasing orientation  3. Assess if patient's hallucinations or delusions are encouraging self harm or harm to others and intervene as appropriate  Outcome: Progressing     Problem: Anxiety  Goal: Will report anxiety at manageable levels  Description: INTERVENTIONS:  1. Administer medication as ordered  2. Teach and rehearse alternative coping skills  3. Provide emotional support with 1:1 interaction with staff  Outcome: Progressing  Flowsheets (Taken 2/11/2025 1155)  Will report anxiety at manageable levels:   Administer

## 2025-02-11 NOTE — PROGRESS NOTES
Treatment Team Note:    Therapist met with treatment team to discuss patients treatment, progress toward treatment goals and discharge plans.    Target Symptoms/Reason for admission: Per nursing admission assessment - Reason for Admission: Geo Young is a 45 year old male that came to the ED with increasing depression and anxiety. He reports struggling with anxiety and depression the last thirty years. He reports that he has been thinking about killing himself the last six weeks. He reports constant thoughts of SI. He reports that he has made plans of how and had a couple of attempts. He reports taking sleeping pills a couple weeks ago and drank alcohol in hopes of not waking up. He reports leaving a propane heater running inside the house overnight that is meant for outside. He reports while walking to the hospital tonight that he had thoughts to get ran over by a car. He reports feeling hopeless, helpless worthless, just overwhelmed. He reports racing thoughts and trouble sleeping. Reports that he has been dealing with AVH the last 20 + years.    Diagnoses per psych provider: Suicidal ideation [R45.851]  Depression, unspecified depression type [F32.A]    Patient's aftercare plan is: [facility]    Aftercare appointments made: No - SW will make discharge appointments    Pt lives with: patient lives alone    Collateral obtained from: SW will meet with patient to gather information      Attending groups: New admission - SW will monitor group attendance    Behavior: calm and cooperative    Has patient been completing ADL's:  New admission - unknown at this time - SW will monitor    Sleeping:New admission - unknown at this time.    Taking medication: New admission - unknown at this time.    Misc:

## 2025-02-12 PROCEDURE — 6370000000 HC RX 637 (ALT 250 FOR IP): Performed by: PSYCHIATRY & NEUROLOGY

## 2025-02-12 PROCEDURE — 99232 SBSQ HOSP IP/OBS MODERATE 35: CPT | Performed by: PSYCHIATRY & NEUROLOGY

## 2025-02-12 PROCEDURE — 1240000000 HC EMOTIONAL WELLNESS R&B

## 2025-02-12 RX ORDER — RISPERIDONE 1 MG/1
2 TABLET, ORALLY DISINTEGRATING ORAL 2 TIMES DAILY PRN
Status: DISCONTINUED | OUTPATIENT
Start: 2025-02-12 | End: 2025-02-13

## 2025-02-12 RX ADMIN — RISPERIDONE 2 MG: 1 TABLET, ORALLY DISINTEGRATING ORAL at 12:29

## 2025-02-12 RX ADMIN — DOXEPIN HYDROCHLORIDE 50 MG: 50 CAPSULE ORAL at 21:04

## 2025-02-12 RX ADMIN — NICOTINE POLACRILEX 2 MG: 2 LOZENGE ORAL at 16:26

## 2025-02-12 RX ADMIN — NICOTINE POLACRILEX 2 MG: 2 LOZENGE ORAL at 21:11

## 2025-02-12 RX ADMIN — HYDROXYZINE HYDROCHLORIDE 50 MG: 25 TABLET, FILM COATED ORAL at 08:17

## 2025-02-12 RX ADMIN — Medication 5 MG: at 21:04

## 2025-02-12 RX ADMIN — NICOTINE POLACRILEX 2 MG: 2 LOZENGE ORAL at 14:12

## 2025-02-12 RX ADMIN — NICOTINE POLACRILEX 2 MG: 2 LOZENGE ORAL at 19:06

## 2025-02-12 NOTE — PSYCHOTHERAPY
Group Therapy Note    Date: 2/12/2025  Start Time: 1330  End Time:  1400  Number of Participants: 4    Type of Group: Relaxation      Status After Intervention:  Improved    Participation Level: Active Listener and Interactive    Participation Quality: Appropriate      Speech:  normal      Thought Process/Content: Logical      Affective Functioning: Congruent        Level of consciousness:  Attentive      Response to Learning: Able to verbalize current knowledge/experience      Modes of Intervention: Support      Discipline Responsible: Spiritual Care      Signature:  India Vasquez  Electronically signed by alfred Osborne Mai on 2/12/2025 at 2:39 PM

## 2025-02-12 NOTE — PLAN OF CARE
Problem: Coping  Goal: Pt/Family able to verbalize concerns and demonstrate effective coping strategies  Description: INTERVENTIONS:  1. Assist patient/family to identify coping skills, available support systems and cultural and spiritual values  2. Provide emotional support, including active listening and acknowledgement of concerns of patient and caregivers  3. Reduce environmental stimuli, as able  4. Instruct patient/family in relaxation techniques, as appropriate  5. Assess for spiritual pain/suffering and initiate Spiritual Care, Psychosocial Clinical Specialist consults as needed  Outcome: Progressing  Note:                                                                     Group Therapy Note    Date: 2/12/2025  Start Time: 1000  End Time:  1030  Number of Participants: 8    Type of Group: Psychoeducation    Wellness Binder Information  Module Name:  Women's Issues  Session Number:  4    Group Goal for Pt:  To raise awareness of how thoughts influence feelings    Notes:  Pt demonstrated improved awareness of how thoughts influence feelings by actively participating in group discussion.    Status After Intervention:  Unchanged    Participation Level: Interactive    Participation Quality: Appropriate      Speech:  normal      Thought Process/Content: Logical      Affective Functioning: Congruent      Mood: anxious and depressed      Level of consciousness:  Alert and Oriented x4      Response to Learning: Able to verbalize current knowledge/experience, Able to verbalize/acknowledge new learning, and Progressing to goal      Endings: None Reported    Modes of Intervention: Education      Discipline Responsible: Psychoeducational Specialist      Signature:  Tatyana Gao

## 2025-02-12 NOTE — PROGRESS NOTES
[facility] Adult Unit Daily Assessment  Nursing Progress Note    Room: [facility]611-01   Name: Geo Young   Age: 45 y.o.   Gender: male   Dx: Depression, unspecified depression type  Precautions: suicide risk and fall risk  Inpatient Status: voluntary     SLEEP:  Sleep Quality  TBD  Sleep Medications: Yes   PRN Sleep Meds: No     MEDICAL:  Other PRN Meds: Yes   Med Compliant: Yes  Accu-Chek: No  Oxygen/CPAP/BiPAP: No  CIWA/CINA: No   PAIN Assessment: none  Side Effects from medication: No    Metabolic Screening:  Lab Results   Component Value Date    LABA1C 5.7 11/18/2018     Lab Results   Component Value Date    CHOL 235 (H) 11/18/2018     Lab Results   Component Value Date    TRIG 84 11/18/2018     Lab Results   Component Value Date    HDL 52 (L) 11/18/2018     No components found for: \"LDLCAL\"  No components found for: \"LABVLDL\"  Body mass index is 23.43 kg/m².  BP Readings from Last 2 Encounters:   02/11/25 119/74   12/02/22 116/75       Medical Bed:   Is patient in a medical bed? no   If medical bed is in use, has nursing secured room while patient is awake and out of the room? NA  Has safety checks by nursing been completed on the bed/room this shift? NA    Protective Factors:  Patient identifies protective factors with nursing staff as follows:   Identifies reasons for living: Yes   Supportive Social Network or family: No    Belief that suicide is immoral/high spirituality: No   Responsibility to family or others/living with family: no   Fear of death or dying due to pain and suffering: No   Engaged in work or school: No  If Patient is unable to identify, reason why?     Depression: 5   Anxiety: 5   SI yes. Reports he always has SI  Risk of Suicide: Moderate Risk  HI Negative for homicidal ideation        AVH:yes If Hallucinations are present, describe? AVH    Appetite: no change from normal   Percent Meals:    Social: Yes   Speech: normal   Appearance: appropriately dressed    GROUP:  Group Participation:

## 2025-02-12 NOTE — PROGRESS NOTES
02/12/25 1446   Encounter Summary   Encounter Overview/[facility]   Encounter Code  Counseling, Group, by  services   Service Provided For Patient   Referral/Consult From Rounding   Complexity of Encounter Moderate   Begin Time 1330   End Time  1400   Total Time Calculated 30 min   Spiritual/Emotional needs   Type Spiritual Support   Behavioral Health    Type  Spirituality Group   Assessment/Intervention/Outcome   Assessment Calm   Intervention Active listening;Discussed belief system/Jehovah's witness practices/daija;Prayer (assurance of)/[place]   Outcome Expressed feelings, needs, and concerns;Expressed Gratitude   Plan and Referrals   Plan/Referrals Continue Support (comment)   Does the patient have a [facility] PCP? Yes    to follow up after discharge? No     Electronically signed by Chaplain India Vasquez on 2/12/2025 at 2:48 PM

## 2025-02-12 NOTE — PROGRESS NOTES
Group Note     Date: 02/11/25  Start Time: 8:30 AM   End Time:9:00 AM      Number of Participants: 7     Type of Group: Community/Goal      Patient's Goal: \"Take medication, go to groups and get sleep. Meal schedule back on track.\"    Notes:  Patient reports needing medication to sleep poor. Reports having trouble falling asleep and states \"mixture of medication made me feel weird.\" Concentration is poor. Rates depression 5 (same), depression 5 (improving) and pranav 3 (improving) Hallucinations (constant), SI thoughts (constant) and off/ on anxiety attacks. Experiencing new symptoms of blurred vision. Patient is requesting help with taking medication one at a time in order to determine what medication is \"doing what.\" Patient is developing an aftercare plan of getting involved with [facility] pending insurance upon discharge.     Status After Intervention:  Improved    Participation Level: Active Listener and Interactive    Participation Quality: Appropriate, Attentive, Sharing, and Supportive    Speech:  normal    Thought Process/Content: Logical    Mood: anxious and depressed    Level of consciousness:  Alert    Response to Learning: Able to verbalize current knowledge/experience, Able to verbalize/acknowledge new learning, Able to retain information, Capable of insight, and Able to change behavior    Modes of Intervention: Education and Support    Discipline Responsible: /Counselor     Signature:  SALOME Frankel

## 2025-02-12 NOTE — PROGRESS NOTES
Treatment Team Note:    Therapist met with treatment team to discuss patients treatment, progress toward treatment goals and discharge plans.    Target Symptoms/Reason for admission: Per nursing admission assessment - Reason for Admission: Geo Young is a 45 year old male that came to the ED with increasing depression and anxiety. He reports struggling with anxiety and depression the last thirty years. He reports that he has been thinking about killing himself the last six weeks. He reports constant thoughts of SI. He reports that he has made plans of how and had a couple of attempts. He reports taking sleeping pills a couple weeks ago and drank alcohol in hopes of not waking up. He reports leaving a propane heater running inside the house overnight that is meant for outside. He reports while walking to the hospital tonight that he had thoughts to get ran over by a car. He reports feeling hopeless, helpless worthless, just overwhelmed. He reports racing thoughts and trouble sleeping. Reports that he has been dealing with AVH the last 20 + years.    Diagnoses per psych provider: Suicidal ideation [R45.851]  Depression, unspecified depression type [F32.A]  Unspecified mood (affective) disorder (HCC) [F39]    Patient's aftercare plan is: [facility]    Aftercare appointments made: Yes    Pt lives with: patient lives alone    Collateral obtained from: SW will meet with patient to gather information  Collateral obtained on:    Attending groups:  Minimal participation    Behavior: cooperative, social with peers, reports he is\" doing a little better \", reports having SI, contracts for safety, reports chronic SI,endorses AVH, rates depression/anxiety 5/10.    Has patient been completing ADL's:  Yes    Sleeping: TBD    Taking medication: Yes    Misc: Tentative discharge Thursday/Friday.

## 2025-02-12 NOTE — PLAN OF CARE
Problem: Safety - Adult  Goal: Free from fall injury  2/12/2025 1242 by Vasyl Jimenez RN  Outcome: Progressing  2/12/2025 1241 by Vasyl Jimenez RN  Outcome: Progressing     Problem: Self Harm/Suicidality  Goal: Will have no self-injury during hospital stay  Description: INTERVENTIONS:  1.  Ensure constant observer at bedside with Q15M safety checks  2.  Maintain a safe environment  3.  Secure patient belongings  4.  Ensure family/visitors adhere to safety recommendations  5.  Ensure safety tray has been added to patient's diet order  6.  Every shift and PRN: Re-assess suicidal risk via Frequent Screener    2/12/2025 1242 by Vasyl Jimenez RN  Outcome: Progressing  2/12/2025 1241 by Vasyl Jimenez RN  Outcome: Progressing  Flowsheets (Taken 2/12/2025 1233)  Will have no self-injury during hospital stay:   Ensure constant observer at bedside with Q15M safety checks   Secure patient belongings   Ensure family/visitors adhere to safety recommendations   Maintain a safe environment   Every shift and PRN: Re-assess suicidal risk via Frequent Screener   Ensure safety tray has been added to patient's diet order     Problem: Depression  Goal: Will be euthymic at discharge  Description: INTERVENTIONS:  1. Administer medication as ordered  2. Provide emotional support via 1:1 interaction with staff  3. Encourage involvement in milieu/groups/activities  4. Monitor for social isolation  2/12/2025 1242 by Vasyl Jimenez RN  Outcome: Progressing  2/12/2025 1241 by Vasyl Jimenez RN  Outcome: Progressing     Problem: Psychosis  Goal: Will report no hallucinations or delusions  Description: INTERVENTIONS:  1. Administer medication as  ordered  2. Assist with reality testing to support increasing orientation  3. Assess if patient's hallucinations or delusions are encouraging self harm or harm to others and intervene as appropriate  2/12/2025 1242 by Vasyl Jimenez RN  Outcome: Progressing  2/12/2025 1241 by  Logical      Affective Functioning: Congruent      Mood: anxious and depressed      Level of consciousness:  Alert and Oriented x4      Response to Learning: Able to verbalize current knowledge/experience, Able to verbalize/acknowledge new learning, and Progressing to goal      Endings: None Reported    Modes of Intervention: Education      Discipline Responsible: Psychoeducational Specialist      Signature:  Tatyana Gao    2/12/2025 1157 by Tatyana Gao  Outcome: Progressing  Note:                                                                     Group Therapy Note    Date: 2/12/2025  Start Time: 1000  End Time:  1030  Number of Participants: 8    Type of Group: Psychoeducation    Wellness Binder Information  Module Name:  Women's Issues  Session Number:  4    Group Goal for Pt:  To raise awareness of how thoughts influence feelings    Notes:  Pt demonstrated improved awareness of how thoughts influence feelings by actively participating in group discussion.    Status After Intervention:  Unchanged    Participation Level: Interactive    Participation Quality: Appropriate      Speech:  normal      Thought Process/Content: Logical      Affective Functioning: Congruent      Mood: anxious and depressed      Level of consciousness:  Alert and Oriented x4      Response to Learning: Able to verbalize current knowledge/experience, Able to verbalize/acknowledge new learning, and Progressing to goal      Endings: None Reported    Modes of Intervention: Education      Discipline Responsible: Psychoeducational Specialist      Signature:  Tatyana Gao

## 2025-02-12 NOTE — PROGRESS NOTES
Department of Psychiatry  Progress Note    Chief Complaint: Depression, suicidal ideations, homicidal ideations      SUBJECTIVE:    45 y.o. male with previous psychiatric history of depression, anxiety, alcohol use disorder, stimulant use disorder in remission, hallucinogen use disorder in remission, cannabis use disorder in remission, opioid use disorder in remission, history of treatment noncompliance, who has been admitted to our psychiatric unit secondary to treatment noncompliance, worsening of depression, intermittent suicidal and homicidal ideations.     Patient has been seen in treatment team room.  He reported that his condition mildly improved since time of admission to the hospital, stated that his mood is \"a little better\" today.  Patient endorses good appetite and improved quality of sleep with prescribed psychotropic medications, stated that he did not experience any difficulties to fall asleep and stay asleep and woke up rested well in the morning.  Patient is compliant with currently prescribed medications and denies any side effects.  He continues to endorse feeling of anxiety and depression and rated both of them as 4-5 out of 10, with 10 being the worst.  Patient attends group activities in the unit and participates in some of those activities.  He is social with medical staff and other patients in the unit.  He performs his ADLs daily and took a shower in the morning.  Medical staff reported that patient reported chronic suicidal ideations, when he was evaluated by night shift, however, he denies suicidal homicidal ideations during the interview.  Patient denies auditory and visual hallucinations and it did not seem that he was responding to internal stimuli.  Patient did not endorse any delusions or paranoid thoughts.      OBJECTIVE    /71   Pulse 66   Temp 97.3 °F (36.3 °C) (Temporal)   Resp 16   Ht 1.803 m (5' 11\")   Wt 76.2 kg (168 lb 0.4 oz)   SpO2 98%   BMI 23.43 kg/m²      Review of Systems: 14 point review of systems is negative    Psychological ROS: Positive for presence of anxiety and depression    Mental Status Examination:    Appearance: Appropriately groomed and in hospital attire. Made good eye contact.   Behavior: Calm, cooperative and socially appropriate. No psychomotor retardation/agitation appreciated. Gait unremarkable.   Speech: Normal in tone, volume, and quality.  Mood: \"a little better\"   Affect: Mood congruent. Range is slightly restricted  Thought Process: Mostly linear and goal-oriented  Thought Content: Patient does not have any current active suicidal and homicidal ideations. No overt delusions or paranoia appreciated.   Perceptions: Seems patient does not have any auditory or visual hallucinations at present time. Patient did not appear to be responding to internal stimuli. No overt psychosis.   Orientation: to person, place, date, and situation.   Alert.    Impulsivity: Limited  Neurovegitative: Fair appetite, improved sleep  Insight: Limited  Judgment: Limited      Data  No new lab test results to review    Medications:  Current Facility-Administered Medications: doxepin (SINEQUAN) capsule 50 mg, 50 mg, Oral, Nightly  doxepin (SINEQUAN) capsule 25 mg, 25 mg, Oral, Nightly PRN  melatonin disintegrating tablet 5 mg, 5 mg, Oral, Nightly  hydrOXYzine HCl (ATARAX) tablet 50 mg, 50 mg, Oral, TID PRN  acetaminophen (TYLENOL) tablet 650 mg, 650 mg, Oral, Q4H PRN  polyethylene glycol (GLYCOLAX) packet 17 g, 17 g, Oral, Daily PRN  nicotine polacrilex (COMMIT) lozenge 2 mg, 2 mg, Oral, Q1H PRN  nicotine (NICODERM CQ) 21 MG/24HR 1 patch, 1 patch, TransDERmal, Daily     ASSESSMENT AND PLAN    DSM 5 Diagnosis:  Mood disorder, unspecified  Cluster B personality disorder - borderline PD  Alcohol use disorder  Tobacco use disorder  Treatment noncompliance  Insomnia  Rule out malingering    Plan:   1. Psychiatric Medications:   Continue current psychotropic medications as

## 2025-02-12 NOTE — H&P
HISTORY and PHYSICAL      CHIEF COMPLAINT:  Depression, SI, HI    Reason for Admission:  Depression, SI, HI    History Obtained From:  patient    HISTORY OF PRESENT ILLNESS:      The patient is a 45 y.o. male who is admitted to the [facility] unit with worsening mood issues. He denies any c/o chest pain or SOA. He has had no abdominal pain or N/V. He has had no dysuria. He has had no new pain issues. He has chronic back pain. He has had no fevers.   Past Medical History:        Diagnosis Date    Anxiety     Depression     Panic attack     Roni [place] spotted fever 2005     Past Surgical History:        Procedure Laterality Date    LYMPHADENECTOMY Left     removed when was a child, early 90's    OTHER SURGICAL HISTORY       LYMPTH NODE REMOVED IN NECK AS A CHILD AFTER CAT SCRATCH         Medications Prior to Admission:    No medications prior to admission.    Allergies:  Patient has no known allergies.    Social History:   TOBACCO:   reports that he has been smoking cigarettes. He has never used smokeless tobacco.  ETOH:   reports current alcohol use.  DRUGS:   reports no history of drug use.  MARITAL STATUS:  single  OCCUPATION:  Not working  Patient currently lives alone      Family History:       Problem Relation Age of Onset    Diabetes Father     Heart Disease Father      REVIEW OF SYSTEMS:  Constitutional: neg  CV: neg  Pulmonary: neg  GI: neg  : neg  Psych: depression, SI, HI  Neuro: neg  Skin: neg  MusculoSkeletal: neg  HEENT: neg  Joints: neg    Vitals:  /71   Pulse 66   Temp 97.3 °F (36.3 °C) (Temporal)   Resp 16   Ht 1.803 m (5' 11\")   Wt 76.2 kg (168 lb 0.4 oz)   SpO2 98%   BMI 23.43 kg/m²     PHYSICAL EXAM:  Gen: NAD, alert  HEENT: WNL  Lymph: no LAD  Neck: no JVD or masses  Chest: CTA bilat  CV: RRR  Abdomen: NT/ND  Extrem: no C/C/E  Neuro: non focal  Skin: no rashes  Joints: no redness    DATA:  I have reviewed the admission labs

## 2025-02-12 NOTE — PROGRESS NOTES
SW met with patient to complete psychosocial and lifetime CSSR-S on this date. Patients long and short-term goals discussed. Patient voiced understanding. Treatment plan sheet signed. Patient verbalized understanding of the treatment plan. Patient participated in goals and objectives of the treatment plan. Patient discussed safety plan with .    In the last 6 months has the patient been a danger to self: No  In the last 6 months has the patient been a danger to others: No  Legal Guardian/POA: No    Activity Assessment:  Skills:   Talents:   Interests: working on car anything with my hands    Provided patient with [employer] handout entitled \"Quitting Smoking.\"  Reviewed handout with patient: addressing dangers of smoking, developing coping skills, and providing basic information about quitting.       Patient received all components practical counseling of tobacco practical counseling during the hospital stay.

## 2025-02-12 NOTE — PROGRESS NOTES
[facility] Adult Unit Daily Assessment  Nursing Progress Note    Room: [facility]611-01   Name: Geo Young   Age: 45 y.o.   Gender: male   Dx: Depression, unspecified depression type  Precautions: suicide risk and fall risk  Inpatient Status: voluntary     SLEEP:  Sleep Quality Very good  Sleep Medications: No   PRN Sleep Meds: No     MEDICAL:  Other PRN Meds: Yes   Med Compliant: Yes  Accu-Chek: No  Oxygen/CPAP/BiPAP: No  CIWA/CINA: No   PAIN Assessment: none  Side Effects from medication: No    Metabolic Screening:  Lab Results   Component Value Date    LABA1C 5.7 11/18/2018     Lab Results   Component Value Date    CHOL 235 (H) 11/18/2018     Lab Results   Component Value Date    TRIG 84 11/18/2018     Lab Results   Component Value Date    HDL 52 (L) 11/18/2018     No components found for: \"LDLCAL\"  No components found for: \"LABVLDL\"  Body mass index is 23.43 kg/m².  BP Readings from Last 2 Encounters:   02/12/25 106/71   12/02/22 116/75       Medical Bed:   Is patient in a medical bed? no   If medical bed is in use, has nursing secured room while patient is awake and out of the room? NA  Has safety checks by nursing been completed on the bed/room this shift? NA    Protective Factors:  Patient identifies protective factors with nursing staff as follows:   Identifies reasons for living: Yes   Supportive Social Network or family: No    Belief that suicide is immoral/high spirituality: Yes   Responsibility to family or others/living with family: No   Fear of death or dying due to pain and suffering: Yes   Engaged in work or school: No  If Patient is unable to identify, reason why? Reason for living: \"Hope of hope.\"     Depression: 5   Anxiety: 5   SI without intent   Risk of Suicide: Moderate Risk  HI Negative for homicidal ideation        AVH:yes If Hallucinations are present, describe? Sees tracers and vapor trails and hears beeps, noises and notifications.    Appetite: good   Percent Meals: 100%   Social: Yes   Speech:

## 2025-02-12 NOTE — PLAN OF CARE
Problem: Coping  Goal: Pt/Family able to verbalize concerns and demonstrate effective coping strategies  Description: INTERVENTIONS:  1. Assist patient/family to identify coping skills, available support systems and cultural and spiritual values  2. Provide emotional support, including active listening and acknowledgement of concerns of patient and caregivers  3. Reduce environmental stimuli, as able  4. Instruct patient/family in relaxation techniques, as appropriate  5. Assess for spiritual pain/suffering and initiate Spiritual Care, Psychosocial Clinical Specialist consults as needed  2/12/2025 1201 by Tatyana Gao  Outcome: Progressing  Note:                                                                     Group Therapy Note    Date: 2/12/2025  Start Time: 1100  End Time:  1130  Number of Participants: 7    Type of Group: Healthy Living/Wellness    Wellness Binder Information  Module Name:  Mental Health Wellness  Session Number:  1    Group Goal for Pt:  To improve knowledge of practical facts about depression    Notes:  Pt demonstrated improved knowledge of practical facts about depression by actively participating in group activity.    Status After Intervention:  Unchanged    Participation Level: Active Listener and Interactive    Participation Quality: Appropriate and Attentive      Speech:  normal      Thought Process/Content: Logical      Affective Functioning: Congruent      Mood: anxious and depressed      Level of consciousness:  Alert and Oriented x4      Response to Learning: Able to verbalize current knowledge/experience, Able to verbalize/acknowledge new learning, and Progressing to goal      Endings: None Reported    Modes of Intervention: Education      Discipline Responsible: Psychoeducational Specialist      Signature:  Tatyana Gao

## 2025-02-12 NOTE — PROGRESS NOTES
Group Note    Date: 02/11/25  Start Time: 7:00 PM   End Time:7:30 PM     Number of Participants: 8    Type of Group: Wrap-Up     Patient's Goal:  none listed    Notes:  none listed    Status After Intervention:  Unchanged    Participation Level: Minimal    Participation Quality: Appropriate    Speech:  normal    Thought Process/Content: Logical    Mood: anxious and depressed    Level of consciousness:  Alert and Preoccupied    Response to Learning: Progressing to goal    Modes of Intervention: Education and Support    Discipline Responsible: Registered Nurse     Signature:  ANDRZEJ MCKAY RN

## 2025-02-12 NOTE — PROGRESS NOTES
Group Note    Date: 02/12/25  Start Time: 8:00 AM   End Time:8:30 AM     Number of Participants: 7    Type of Group: Community/Goal     Patient's Goal:  \"Go to groups, take meds, and talk to people\"    Notes:      Status After Intervention:      Participation Level: Active Listener    Participation Quality: Appropriate    Speech:  normal    Thought Process/Content: Logical    Mood:  Calm    Level of consciousness:  Alert    Response to Learning: Able to verbalize current knowledge/experience    Modes of Intervention: Education and Support    Discipline Responsible: Behavioral Health Technician     Signature:  TAYLOR DOS SANTOS

## 2025-02-13 LAB
25(OH)D3 SERPL-MCNC: 11.6 NG/ML
CHOLEST SERPL-MCNC: 180 MG/DL (ref 0–199)
HBA1C MFR BLD: 5.6 % (ref 4–5.6)
HDLC SERPL-MCNC: 61 MG/DL (ref 40–60)
LDLC SERPL CALC-MCNC: 103 MG/DL
TRIGL SERPL-MCNC: 79 MG/DL (ref 0–149)
TSH SERPL DL<=0.005 MIU/L-ACNC: 1.8 UIU/ML (ref 0.27–4.2)
VIT B12 SERPL-MCNC: 301 PG/ML (ref 232–1245)

## 2025-02-13 PROCEDURE — 82306 VITAMIN D 25 HYDROXY: CPT

## 2025-02-13 PROCEDURE — 84443 ASSAY THYROID STIM HORMONE: CPT

## 2025-02-13 PROCEDURE — 6370000000 HC RX 637 (ALT 250 FOR IP): Performed by: FAMILY MEDICINE

## 2025-02-13 PROCEDURE — 99232 SBSQ HOSP IP/OBS MODERATE 35: CPT | Performed by: PSYCHIATRY & NEUROLOGY

## 2025-02-13 PROCEDURE — 83036 HEMOGLOBIN GLYCOSYLATED A1C: CPT

## 2025-02-13 PROCEDURE — 82607 VITAMIN B-12: CPT

## 2025-02-13 PROCEDURE — 6370000000 HC RX 637 (ALT 250 FOR IP): Performed by: PSYCHIATRY & NEUROLOGY

## 2025-02-13 PROCEDURE — 1240000000 HC EMOTIONAL WELLNESS R&B

## 2025-02-13 PROCEDURE — 80061 LIPID PANEL: CPT

## 2025-02-13 PROCEDURE — 36415 COLL VENOUS BLD VENIPUNCTURE: CPT

## 2025-02-13 RX ORDER — MECOBALAMIN 5000 MCG
5 TABLET,DISINTEGRATING ORAL NIGHTLY
Qty: 30 TABLET | Refills: 0 | Status: SHIPPED | OUTPATIENT
Start: 2025-02-13

## 2025-02-13 RX ORDER — DOXEPIN HYDROCHLORIDE 50 MG/1
50 CAPSULE ORAL NIGHTLY
Qty: 30 CAPSULE | Refills: 0 | Status: SHIPPED | OUTPATIENT
Start: 2025-02-13

## 2025-02-13 RX ORDER — HYDROXYZINE HYDROCHLORIDE 50 MG/1
50 TABLET, FILM COATED ORAL 3 TIMES DAILY PRN
Qty: 90 TABLET | Refills: 0 | Status: SHIPPED | OUTPATIENT
Start: 2025-02-13

## 2025-02-13 RX ORDER — ERGOCALCIFEROL 1.25 MG/1
50000 CAPSULE ORAL WEEKLY
Qty: 5 CAPSULE | Refills: 0 | Status: SHIPPED | OUTPATIENT
Start: 2025-02-20

## 2025-02-13 RX ORDER — HYDROXYZINE HYDROCHLORIDE 25 MG/1
50 TABLET, FILM COATED ORAL 3 TIMES DAILY PRN
Status: DISCONTINUED | OUTPATIENT
Start: 2025-02-13 | End: 2025-02-14 | Stop reason: HOSPADM

## 2025-02-13 RX ORDER — ERGOCALCIFEROL 1.25 MG/1
50000 CAPSULE, LIQUID FILLED ORAL WEEKLY
Status: DISCONTINUED | OUTPATIENT
Start: 2025-02-13 | End: 2025-02-14 | Stop reason: HOSPADM

## 2025-02-13 RX ORDER — MULTIVITAMIN WITH IRON
500 TABLET ORAL DAILY
Status: DISCONTINUED | OUTPATIENT
Start: 2025-02-13 | End: 2025-02-14 | Stop reason: HOSPADM

## 2025-02-13 RX ADMIN — HYDROXYZINE HYDROCHLORIDE 50 MG: 25 TABLET ORAL at 19:06

## 2025-02-13 RX ADMIN — Medication 5 MG: at 20:24

## 2025-02-13 RX ADMIN — NICOTINE POLACRILEX 2 MG: 2 LOZENGE ORAL at 21:04

## 2025-02-13 RX ADMIN — NICOTINE POLACRILEX 2 MG: 2 LOZENGE ORAL at 16:51

## 2025-02-13 RX ADMIN — NICOTINE POLACRILEX 2 MG: 2 LOZENGE ORAL at 19:06

## 2025-02-13 RX ADMIN — NICOTINE POLACRILEX 2 MG: 2 LOZENGE ORAL at 14:55

## 2025-02-13 RX ADMIN — CYANOCOBALAMIN TAB 500 MCG 500 MCG: 500 TAB at 07:58

## 2025-02-13 RX ADMIN — ERGOCALCIFEROL 50000 UNITS: 1.25 CAPSULE ORAL at 07:58

## 2025-02-13 RX ADMIN — NICOTINE POLACRILEX 2 MG: 2 LOZENGE ORAL at 08:11

## 2025-02-13 RX ADMIN — RISPERIDONE 2 MG: 1 TABLET, ORALLY DISINTEGRATING ORAL at 08:10

## 2025-02-13 RX ADMIN — NICOTINE POLACRILEX 2 MG: 2 LOZENGE ORAL at 09:53

## 2025-02-13 RX ADMIN — DOXEPIN HYDROCHLORIDE 50 MG: 50 CAPSULE ORAL at 20:24

## 2025-02-13 RX ADMIN — HYDROXYZINE HYDROCHLORIDE 50 MG: 25 TABLET ORAL at 09:53

## 2025-02-13 NOTE — BH NOTE
Group Note    Date: 02/12/25  Start Time: 8:00 PM   End Time:8:30 PM     Number of Participants: 6    Type of Group: Wrap-Up     Patient's Goal:  None     Notes:  Pt reports constant anxiety and improved depression.     Status After Intervention:      Participation Level: Active Listener    Participation Quality: Appropriate    Speech:  normal    Thought Process/Content: Logical    Mood: Calm     Level of consciousness:  Alert    Response to Learning: Able to verbalize current knowledge/experience, Able to verbalize/acknowledge new learning, and Able to retain information    Modes of Intervention: Education and Support    Discipline Responsible: Behavioral Health Technician     Signature:  Dayday Perez

## 2025-02-13 NOTE — DISCHARGE INSTR - DIET

## 2025-02-13 NOTE — PROGRESS NOTES
[facility] Adult Unit Daily Assessment  Nursing Progress Note    Room: [address] [place] [address]-   Name: Geo Young   Age: 45 y.o.   Gender: male   Dx: Depression, unspecified depression type  Precautions: suicide risk and fall risk  Inpatient Status: voluntary     SLEEP:  Sleep Quality Good  Sleep Medications: Yes last night  PRN Sleep Meds: No     MEDICAL:  Other PRN Meds: No   Med Compliant: Yes  Accu-Chek: No  Oxygen/CPAP/BiPAP: No  CIWA/CINA: No   PAIN Assessment: none  Side Effects from medication: No    Metabolic Screening:  Lab Results   Component Value Date    LABA1C 5.6 02/13/2025     Lab Results   Component Value Date    CHOL 180 02/13/2025    CHOL 235 (H) 11/18/2018     Lab Results   Component Value Date    TRIG 79 02/13/2025    TRIG 84 11/18/2018     Lab Results   Component Value Date    HDL 61 (H) 02/13/2025    HDL 52 (L) 11/18/2018     No components found for: \"LDLCAL\"  No components found for: \"LABVLDL\"  Body mass index is 23.43 kg/m².  BP Readings from Last 2 Encounters:   02/13/25 102/79   12/02/22 116/75       Medical Bed:   Is patient in a medical bed? no   If medical bed is in use, has nursing secured room while patient is awake and out of the room? NA  Has safety checks by nursing been completed on the bed/room this shift? yes    Protective Factors:  Patient identifies protective factors with nursing staff as follows:   Identifies reasons for living: Yes   Supportive Social Network or family: Yes    Belief that suicide is immoral/high spirituality: Yes   Responsibility to family or others/living with family: Yes   Fear of death or dying due to pain and suffering: Yes and No pt states she kind of is kind of isn't   Engaged in work or school: Yes  If Patient is unable to identify, reason why?     Depression: 2   Anxiety: 4   SI denies suicidal ideation   Risk of Suicide: Low Risk  HI Negative for homicidal ideation        AVH:no If Hallucinations are present, describe?     Appetite: good   Percent Meals: 75%

## 2025-02-13 NOTE — PLAN OF CARE
Problem: Safety - Adult  Goal: Free from fall injury  Outcome: Progressing     Problem: Self Harm/Suicidality  Goal: Will have no self-injury during hospital stay  Description: INTERVENTIONS:  1.  Ensure constant observer at bedside with Q15M safety checks  2.  Maintain a safe environment  3.  Secure patient belongings  4.  Ensure family/visitors adhere to safety recommendations  5.  Ensure safety tray has been added to patient's diet order  6.  Every shift and PRN: Re-assess suicidal risk via Frequent Screener    Outcome: Progressing  Flowsheets (Taken 2/13/2025 0913)  Will have no self-injury during hospital stay:   Ensure family/visitors adhere to safety recommendations   Every shift and PRN: Re-assess suicidal risk via Frequent Screener   Maintain a safe environment   Secure patient belongings   Ensure safety tray has been added to patient's diet order     Problem: Depression  Goal: Will be euthymic at discharge  Description: INTERVENTIONS:  1. Administer medication as ordered  2. Provide emotional support via 1:1 interaction with staff  3. Encourage involvement in milieu/groups/activities  4. Monitor for social isolation  Outcome: Progressing     Problem: Psychosis  Goal: Will report no hallucinations or delusions  Description: INTERVENTIONS:  1. Administer medication as  ordered  2. Assist with reality testing to support increasing orientation  3. Assess if patient's hallucinations or delusions are encouraging self harm or harm to others and intervene as appropriate  Outcome: Progressing     Problem: Anxiety  Goal: Will report anxiety at manageable levels  Description: INTERVENTIONS:  1. Administer medication as ordered  2. Teach and rehearse alternative coping skills  3. Provide emotional support with 1:1 interaction with staff  Outcome: Progressing  Flowsheets (Taken 2/13/2025 0913)  Will report anxiety at manageable levels:   Administer medication as ordered   Provide emotional support with 1:1  interaction with staff   Teach and rehearse alternative coping skills     Problem: Sleep Disturbance  Goal: Will exhibit normal sleeping pattern  Description: INTERVENTIONS:  1. Administer medication as ordered  2. Decrease environmental stimuli, including noise, as appropriate  3. Discourage social isolation and naps during the day  Outcome: Progressing     Problem: Discharge Planning  Goal: Discharge to home or other facility with appropriate resources  Outcome: Progressing  Flowsheets (Taken 2/13/2025 0913)  Discharge to home or other facility with appropriate resources:   Identify barriers to discharge with patient and caregiver   Arrange for needed discharge resources and transportation as appropriate   Identify discharge learning needs (meds, wound care, etc)   Arrange for interpreters to assist at discharge as needed   Refer to discharge planning if patient needs post-hospital services based on physician order or complex needs related to functional status, cognitive ability or social support system     Problem: Coping  Goal: Pt/Family able to verbalize concerns and demonstrate effective coping strategies  Description: INTERVENTIONS:  1. Assist patient/family to identify coping skills, available support systems and cultural and spiritual values  2. Provide emotional support, including active listening and acknowledgement of concerns of patient and caregivers  3. Reduce environmental stimuli, as able  4. Instruct patient/family in relaxation techniques, as appropriate  5. Assess for spiritual pain/suffering and initiate Spiritual Care, Psychosocial Clinical Specialist consults as needed  Outcome: Progressing  Flowsheets (Taken 2/13/2025 0913)  Patient/family able to verbalize anxieties, fears, and concerns, and demonstrate effective coping:   Assist patient/family to identify coping skills, available support systems and cultural and spiritual values   Provide emotional support, including active listening and

## 2025-02-13 NOTE — PROGRESS NOTES
[facility] Adult Unit Daily Assessment  Nursing Progress Note    Room: [address] [place] [address]-   Name: Geo Young   Age: 45 y.o.   Gender: male   Dx: Depression, unspecified depression type  Precautions: suicide risk and fall risk  Inpatient Status: voluntary     SLEEP:  Sleep Quality Good  Sleep Medications: Yes last night  PRN Sleep Meds: No     MEDICAL:  Other PRN Meds: Yes risperdal and nicotine lozenge   Med Compliant: Yes  Accu-Chek: No  Oxygen/CPAP/BiPAP: No  CIWA/CINA: No   PAIN Assessment: none  Side Effects from medication: No    Metabolic Screening:  Lab Results   Component Value Date    LABA1C 5.6 02/13/2025     Lab Results   Component Value Date    CHOL 180 02/13/2025    CHOL 235 (H) 11/18/2018     Lab Results   Component Value Date    TRIG 79 02/13/2025    TRIG 84 11/18/2018     Lab Results   Component Value Date    HDL 61 (H) 02/13/2025    HDL 52 (L) 11/18/2018     No components found for: \"LDLCAL\"  No components found for: \"LABVLDL\"  Body mass index is 23.43 kg/m².  BP Readings from Last 2 Encounters:   02/13/25 102/79   12/02/22 116/75       Medical Bed:   Is patient in a medical bed? no   If medical bed is in use, has nursing secured room while patient is awake and out of the room? NA  Has safety checks by nursing been completed on the bed/room this shift? yes    Protective Factors:  Patient identifies protective factors with nursing staff as follows:   Identifies reasons for living: Yes   Supportive Social Network or family: No    Belief that suicide is immoral/high spirituality: No   Responsibility to family or others/living with family: No   Fear of death or dying due to pain and suffering: Yes   Engaged in work or school: No  If Patient is unable to identify, reason why?     Depression: 4   Anxiety: 4   SI passive   Risk of Suicide: Moderate Risk  HI Negative for homicidal ideation        AVH:yes If Hallucinations are present, describe? Sees trails following behind people and hears chirping    Appetite: good

## 2025-02-13 NOTE — PROGRESS NOTES
[facility] Adult Unit Daily Assessment  Nursing Progress Note    Room: [facility]-01   Name: Geo Young   Age: 45 y.o.   Gender: male   Dx: Depression, unspecified depression type  Precautions: suicide risk and fall risk  Inpatient Status: voluntary     SLEEP:  Sleep Quality Good  Sleep Medications: Yes   PRN Sleep Meds: No     MEDICAL:  Other PRN Meds: No   Med Compliant: Yes  Accu-Chek: No  Oxygen/CPAP/BiPAP: No  CIWA/CINA: No   PAIN Assessment: none  Side Effects from medication: No    Metabolic Screening:  Lab Results   Component Value Date    LABA1C 5.7 11/18/2018     Lab Results   Component Value Date    CHOL 235 (H) 11/18/2018     Lab Results   Component Value Date    TRIG 84 11/18/2018     Lab Results   Component Value Date    HDL 52 (L) 11/18/2018     No components found for: \"LDLCAL\"  No components found for: \"LABVLDL\"  Body mass index is 23.43 kg/m².  BP Readings from Last 2 Encounters:   02/12/25 (!) 112/93   12/02/22 116/75       Medical Bed:   Is patient in a medical bed? no   If medical bed is in use, has nursing secured room while patient is awake and out of the room? NA  Has safety checks by nursing been completed on the bed/room this shift? NA    Protective Factors:  Patient identifies protective factors with nursing staff as follows:   Identifies reasons for living: Yes   Supportive Social Network or family: No    Belief that suicide is immoral/high spirituality: No   Responsibility to family or others/living with family: No   Fear of death or dying due to pain and suffering: No   Engaged in work or school: No  If Patient is unable to identify, reason why?     Depression: 4   Anxiety: 5   SI without plan and without intent   Risk of Suicide: Moderate Risk  HI Negative for homicidal ideation        AVH:yes If Hallucinations are present, describe? AVH     Appetite: no change from normal   Percent Meals:    Social: Yes   Speech: normal   Appearance: appropriately dressed    GROUP:  Group Participation:

## 2025-02-13 NOTE — PROGRESS NOTES
Group Note    Date: 02/13/25  Start Time: 8:00 AM   End Time:8:30 AM     Number of Participants: 5    Type of Group: Community/Goal     Patient's Goal:  Go to groups, take meds, and talk to other patients.    Notes:      Status After Intervention:      Participation Level: Active Listener    Participation Quality: Appropriate    Speech:  normal    Thought Process/Content: Logical    Mood:  calm    Level of consciousness:  Alert    Response to Learning: Able to verbalize current knowledge/experience    Modes of Intervention: Education and Support    Discipline Responsible: Behavioral Health Technician     Signature:  Konrad Brennan

## 2025-02-13 NOTE — GROUP NOTE
Group Note    Date: 02/13/25  Start Time: 10:00 AM   End Time:10:45 AM     Number of Participants: 3    Type of Group: Psychoeducational      Patient's Goal: Managing Depression    Notes: SW discussed with pt's ways they can try to manage their mental health which included taking their medication as prescribed, attending therapy appointments, leaning on their support system, and utilizing their coping strategies. Pt reports that he had to be up front with his boss about his mental health struggles so that when he is having a bad time, they would know ahead of time what was going on with him. He said when he is highly stressed or anxious, he tries to just stay busy by participating in a favorite hobby or maybe listening to music.     Status After Intervention:  Improved    Participation Level: Active Listener and Interactive    Participation Quality: Appropriate, Attentive, and Sharing    Speech:  normal    Thought Process/Content: Logical    Mood: euthymic    Level of consciousness:  Alert, Oriented x4, and Attentive    Response to Learning: Able to verbalize current knowledge/experience and Able to verbalize/acknowledge new learning    Modes of Intervention: Education and Support    Discipline Responsible: Psychoeducational Specialist     Signature:  Margarita Copeland

## 2025-02-13 NOTE — PROGRESS NOTES
Progress Note  Geo Young  2/13/2025 7:26 AM  Subjective:   Admit Date:   2/10/2025      CC/ADMIT DX:       Interval History:   Reviewed overnight events and nursing notes. He has had no new medical issues.     I have reviewed all labs/diagnostics from the last 24hrs.       ROS:   I have done a 10 point ROS and all are negative, except what is mentioned in the HPI.    ADULT ORAL NUTRITION SUPPLEMENT; Breakfast, Lunch, Dinner; Standard High Calorie/High Protein Oral Supplement  ADULT DIET; Regular; Safety Tray; Safety Tray (Disposables)    Medications:      vitamin D  50,000 Units Oral Weekly    vitamin B-12  500 mcg Oral Daily    doxepin  50 mg Oral Nightly    melatonin  5 mg Oral Nightly    nicotine  1 patch TransDERmal Daily           Objective:   Vitals: BP (!) 112/93   Pulse 87   Temp 98.4 °F (36.9 °C) (Temporal)   Resp 16   Ht 1.803 m (5' 11\")   Wt 76.2 kg (168 lb 0.4 oz)   SpO2 96%   BMI 23.43 kg/m²  No intake or output data in the 24 hours ending 02/13/25 0726  General appearance: alert and cooperative with exam  Extremities: extremities normal, atraumatic, no cyanosis or edema  Neurologic:  No obvious focal neurologic deficits.    Assessment and Plan:   Principal Problem:    Depression, unspecified depression type  Active Problems:    Unspecified mood (affective) disorder (HCC)  Resolved Problems:    * No resolved hospital problems. *    Elevated BP    Plan:   Continue present medication(s)    Follow with BP   Follow with Psych      Discharge planning:   home     Reviewed treatment plans with the patient and/or family.             Electronically signed by Opal Saenz MD on 2/13/2025 at 7:26 AM

## 2025-02-13 NOTE — PLAN OF CARE
Problem: Depression  Goal: Will be euthymic at discharge  Description: INTERVENTIONS:  1. Administer medication as ordered  2. Provide emotional support via 1:1 interaction with staff  3. Encourage involvement in milieu/groups/activities  4. Monitor for social isolation  2/13/2025 1139 by Chelsi Darnell LPC  Outcome: Progressing  2/13/2025 0917 by Liza David RN  Outcome: Progressing       Group Therapy Note     Date: 2/13/2025  Start Time: 1100  End Time:  1130  Number of Participants: 3     Type of Group: Psychotherapy     Status After Intervention:  Improved     Participation Level: Active Listener     Participation Quality: Appropriate, Attentive, and Sharing        Speech:  normal        Thought Process/Content: Logical        Affective Functioning: Congruent        Mood: euthymic        Level of consciousness:  Alert        Response to Learning: Able to verbalize current knowledge/experience        Endings: None Reported     Modes of Intervention: Education, Support, and Socialization        Discipline Responsible: /Counselor        Signature:  Chelsi Darnell LPC

## 2025-02-13 NOTE — PROGRESS NOTES
Department of Psychiatry  Progress Note    Chief Complaint:  Depression, suicidal ideations, homicidal ideations     SUBJECTIVE:    45 y.o. male with previous psychiatric history of depression, anxiety, alcohol use disorder, stimulant use disorder in remission, hallucinogen use disorder in remission, cannabis use disorder in remission, opioid use disorder in remission, history of treatment noncompliance, who has been admitted to our psychiatric unit secondary to treatment noncompliance, worsening of depression, intermittent suicidal and homicidal ideations.     The patient has been seen in treatment team room with presence of the patient's nurse.  He reported that his condition mildly improved since time of admission to the hospital, stated that his mood is \"okay\" today.  Patient endorses good appetite and good quality of sleep with prescribed psychotropic medications, stated that he did not experience significant difficulties to fall asleep and stay asleep during the last night and woke up rested well in the morning.  Patient is compliant with currently prescribed medications and denies any side effects.  He denies beneficial effect of prescribed Risperdal as needed for anxiety, stated that previously prescribed Atarax was more beneficial for management of his anxiety.  Patient denies feeling of depression and continues to report feeling of anxiety and rated level of his anxiety as 4 out of 10, with 10 being the worst.  Patient attends group activities in the unit and participates in those activities.  He is social with medical staff and other patients in the unit.  He performed his ADLs today and took shower.  Patient denies current active suicidal and homicidal ideations, denies any plans.  He denies auditory visual hallucinations.  Patient did not endorse any delusions.      OBJECTIVE    /79   Pulse 79   Temp (!) 96.6 °F (35.9 °C)   Resp 18   Ht 1.803 m (5' 11\")   Wt 76.2 kg (168 lb 0.4 oz)   SpO2

## 2025-02-13 NOTE — PROGRESS NOTES
Treatment Team Note:    Therapist met with treatment team to discuss patients treatment, progress toward treatment goals and discharge plans.    Target Symptoms/Reason for admission: Per nursing admission assessment - Reason for Admission: Geo Young is a 45 year old male that came to the ED with increasing depression and anxiety. He reports struggling with anxiety and depression the last thirty years. He reports that he has been thinking about killing himself the last six weeks. He reports constant thoughts of SI. He reports that he has made plans of how and had a couple of attempts. He reports taking sleeping pills a couple weeks ago and drank alcohol in hopes of not waking up. He reports leaving a propane heater running inside the house overnight that is meant for outside. He reports while walking to the hospital tonight that he had thoughts to get ran over by a car. He reports feeling hopeless, helpless worthless, just overwhelmed. He reports racing thoughts and trouble sleeping. Reports that he has been dealing with AVH the last 20 + years.    Diagnoses per psych provider: Suicidal ideation [R45.851]  Depression, unspecified depression type [F32.A]  Unspecified mood (affective) disorder (HCC) [F39]    Patient's aftercare plan is: [facility]    Aftercare appointments made: Yes    Pt lives with: patient lives alone    Collateral obtained from:  pt refused to sign release for  to obtain collateral     Attending groups: Yes    Behavior: social    Has patient been completing ADL's:  Yes    Sleeping:Yes    Taking medication: Yes    Misc: Per night nursing note: Patient has been out in the milieu and social with his peers this evening. He reports that he has constant suicidal thoughts, but no plans or intent to harm himself here. He is able to contract for safety. He reports having AVH this evening. He reports that it took a while, but he feels like

## 2025-02-14 VITALS
DIASTOLIC BLOOD PRESSURE: 77 MMHG | HEIGHT: 71 IN | RESPIRATION RATE: 18 BRPM | BODY MASS INDEX: 23.52 KG/M2 | HEART RATE: 77 BPM | SYSTOLIC BLOOD PRESSURE: 106 MMHG | TEMPERATURE: 97.7 F | WEIGHT: 168.03 LBS | OXYGEN SATURATION: 97 %

## 2025-02-14 PROCEDURE — 6370000000 HC RX 637 (ALT 250 FOR IP): Performed by: PSYCHIATRY & NEUROLOGY

## 2025-02-14 PROCEDURE — 6370000000 HC RX 637 (ALT 250 FOR IP): Performed by: FAMILY MEDICINE

## 2025-02-14 PROCEDURE — 5130000000 HC BRIDGE APPOINTMENT

## 2025-02-14 PROCEDURE — 99239 HOSP IP/OBS DSCHRG MGMT >30: CPT | Performed by: PSYCHIATRY & NEUROLOGY

## 2025-02-14 RX ADMIN — CYANOCOBALAMIN TAB 500 MCG 500 MCG: 500 TAB at 07:48

## 2025-02-14 RX ADMIN — HYDROXYZINE HYDROCHLORIDE 50 MG: 25 TABLET ORAL at 07:48

## 2025-02-14 RX ADMIN — NICOTINE POLACRILEX 2 MG: 2 LOZENGE ORAL at 07:48

## 2025-02-14 NOTE — DISCHARGE SUMMARY
Patient ID:  Geo Young  [id number]  45 y.o.  1980    Admit date: 2/10/2025  Discharge date: 2/14/2025    Admitting Physician: Johana Griffith MD   Attending Physician: Gail Gardner MD  Discharge Provider: GAIL GARDNER MD     Chief Complaint: Depression, suicidal ideations, homicidal ideations     Admission Diagnoses: Suicidal ideation [R45.851]  Depression, unspecified depression type [F32.A]  Unspecified mood (affective) disorder (HCC) [F39]    Discharge Diagnoses: Mood disorder, unspecified  Cluster B personality disorder - borderline PD  Alcohol use disorder  Tobacco use disorder  Treatment noncompliance  Insomnia  Vitamin B12 deficiency  Vitamin D deficiency  Rule out malingering    Admission Condition: poor    Discharged Condition: stable    Indication for Admission: Treatment noncompliance, worsening of the depression, intermittent suicidal and homicidal ideations    HPI:   The patient is a 45 y.o. male with previous psychiatric history of depression, anxiety, alcohol use disorder, stimulant use disorder in remission, hallucinogen use disorder in remission, cannabis use disorder in remission, opioid use disorder in remission, history of treatment noncompliance, who has been admitted to our psychiatric unit secondary to treatment noncompliance, worsening of depression, intermittent suicidal and homicidal ideations.     Patient is well-known to psychiatry due to previous admission to our psychiatric unit 2 years ago with the same clinical presentation, as at present time.     Patient has been seen in treatment team room with presence of the patient's nurse.  Patient reported that he was not taking any of prescribed psychotropic medications for 2 years and felt good, however, stated that 6 weeks ago he started experiencing feeling of depression, stopped going to work and had intermittent suicidal and homicidal ideations.  He reported that during the last 6 weeks he was experiencing episodes of anxiety as

## 2025-02-14 NOTE — PROGRESS NOTES
Group Note    Date: 02/14/25  Start Time: 7:30 AM   End Time:8:00 AM     Number of Participants: 4    Type of Group: Community/Goal     Patient's Goal:  Getting all info on appointments, meds, and other discharge info.    Notes:      Status After Intervention:      Participation Level: Active Listener    Participation Quality: Appropriate    Speech:  normal    Thought Process/Content: Logical    Mood:  calm    Level of consciousness:  Alert    Response to Learning: Able to verbalize current knowledge/experience    Modes of Intervention: Education and Support    Discipline Responsible: Behavioral Health Technician     Signature:  Konrad Brennan

## 2025-02-14 NOTE — PROGRESS NOTES
[facility] Adult Unit Daily Assessment  Nursing Progress Note    Room: [facility]/611-01   Name: Geo Young   Age: 45 y.o.   Gender: male   Dx: Depression, unspecified depression type  Precautions: suicide risk and fall risk  Inpatient Status: voluntary     SLEEP:  Sleep Quality Good  Sleep Medications: Yes; Doxepin 50mg, melatonin 5mg   PRN Sleep Meds: No     MEDICAL:  Other PRN Meds: No   Med Compliant: Yes  Accu-Chek: No  Oxygen/CPAP/BiPAP: No  CIWA/CINA: No   PAIN Assessment: denies  Side Effects from medication: none voiced    Metabolic Screening:  Lab Results   Component Value Date    LABA1C 5.6 02/13/2025     Lab Results   Component Value Date    CHOL 180 02/13/2025    CHOL 235 (H) 11/18/2018     Lab Results   Component Value Date    TRIG 79 02/13/2025    TRIG 84 11/18/2018     Lab Results   Component Value Date    HDL 61 (H) 02/13/2025    HDL 52 (L) 11/18/2018     No components found for: \"LDLCAL\"  No components found for: \"LABVLDL\"  Body mass index is 23.43 kg/m².  BP Readings from Last 2 Encounters:   02/13/25 116/81   12/02/22 116/75       Medical Bed:   Is patient in a medical bed? no   If medical bed is in use, has nursing secured room while patient is awake and out of the room? NA  Has safety checks by nursing been completed on the bed/room this shift? yes    Protective Factors:  Patient identifies protective factors with nursing staff as follows:   Identifies reasons for living: Yes   Supportive Social Network or family: No    Belief that suicide is immoral/high spirituality: No   Responsibility to family or others/living with family: No   Fear of death or dying due to pain and suffering: Yes   Engaged in work or school: No  If Patient is unable to identify, reason why? N/A    Depression: 4-5   Anxiety: 4-5   SI without intent, Pt states \"I always have thoughts\"   Risk of Suicide: Low Risk  HI Negative for homicidal ideation        AVH:yes If Hallucinations are present, describe? Shadows trailing behind people;  hear beeps    Appetite: good   Percent Meals: no meals consumed during this shift   Social: Yes   Speech: normal   Appearance: appropriately dressed    GROUP:  Group Participation: Yes  Participation Quality: Interactive    Notes: Pt was cooperative with his interview. His expressions are flat, affect is congruent. He is social with staff and peers tonight and spent the evening watching tv and talking with another peer. He rated his depression and anxiety 4-5 and rates his SI as \"I always have those thoughts.\" He denies that he has any intent to act on them, contracts for safety. He denies HI. He reports that he has auditory hallucinations as \"hearing beeps\" and visual hallucinations of \"see shadows trailing behind people.\" He reports having a good appetite and sleep and he was med compliant.  Will continue to monitor for safety as ordered.         Electronically signed by ANDRZEJ MCKAY RN on 2/13/25 at 9:08 PM CST

## 2025-02-14 NOTE — PROGRESS NOTES
Progress Note  Geo Young  2/13/2025 7:02 PM  Subjective:   Admit Date:   2/10/2025      CC/ADMIT DX:       Interval History:   Reviewed overnight events and nursing notes. He denies any new medical concerns.     I have reviewed all labs/diagnostics from the last 24hrs.       ROS:   I have done a 10 point ROS and all are negative, except what is mentioned in the HPI.    ADULT ORAL NUTRITION SUPPLEMENT; Breakfast, Lunch, Dinner; Standard High Calorie/High Protein Oral Supplement  ADULT DIET; Regular; Safety Tray; Safety Tray (Disposables)    Medications:      vitamin D  50,000 Units Oral Weekly    vitamin B-12  500 mcg Oral Daily    doxepin  50 mg Oral Nightly    melatonin  5 mg Oral Nightly    nicotine  1 patch TransDERmal Daily           Objective:   Vitals: /79   Pulse 79   Temp (!) 96.6 °F (35.9 °C)   Resp 18   Ht 1.803 m (5' 11\")   Wt 76.2 kg (168 lb 0.4 oz)   SpO2 98%   BMI 23.43 kg/m²  No intake or output data in the 24 hours ending 02/13/25 1902  General appearance: alert and cooperative with exam  Extremities: extremities normal, atraumatic, no cyanosis or edema  Neurologic:  No obvious focal neurologic deficits.    Assessment and Plan:   Principal Problem:    Depression, unspecified depression type  Active Problems:    Unspecified mood (affective) disorder (HCC)  Resolved Problems:    * No resolved hospital problems. *    Elevated BP    Vit D Def    Plan:   Continue present medication(s)    Replace Vit D   Follow with Psych      Discharge planning:   home     Reviewed treatment plans with the patient and/or family.             Electronically signed by Opal Saenz MD on 2/13/2025 at 7:02 PM

## 2025-02-14 NOTE — PROGRESS NOTES
Behavioral Health   Discharge Note  Bridge Appointment completed: Reviewed Discharge Instructions with patient.    Patient verbalizes understanding and agreement with the discharge plan using the teachback method.     Referral for Outpatient Tobacco Cessation Counseling, upon discharge (juana X if applicable and completed):    ( )  Hospital staff assisted patient to call Quit Line or faxed referral                                   during hospitalization                  ( )  Recognizing danger situations (included triggers and roadblocks), if not completed on admission                    ( )  Coping skills (new ways to manage stress, exercise, relaxation techniques, changing routine, distraction), if not completed on admission                                                           ( )  Basic information about quitting (benefits of quitting, techniques in how to quit, available resources, if not completed on admission  ( ) [facility]   ( ) Patient refused referral  (x ) Patient refused counseling  ( ) Patient refused smoking cessation medication upon discharge  ( ) Patient non-tobacco use    Vaccinations (juana X if applicable and completed):  ( ) Patient states already received influenza vaccine elsewhere  ( ) Patient received influenza vaccine during this hospitalization  (x ) Patient refused influenza vaccine at this time      Pt discharged with followings belongings:  Dental Appliances: None  Vision - Corrective Lenses: Eyeglasses  Hearing Aid: None  Jewelry: None  Body Piercings Removed: No   Valuables sent home with patient.   Valuables retrieved from safe and returned to patient.    Patient left department with Departure Mode: By self via Mobility at Departure: Ambulatory, discharged to Discharged to: Private Residence.   Patient education on aftercare instructions: yes    Patient verbalize understanding of AVS:  yes.    Suicidal Ideations? Yes pt states they  are passive thoughts with no intention of acting on them. He says he thinks he will always have them. Risk of Suicide: Low Risk   HI? Negative for homicidal ideation       AVH? visual  and auditory  sees shadows and hears beeping  Status EXAM upon discharge:  Mental Status and Behavioral Exam  Normal: No  Level of Assistance: Independent/Self  Facial Expression: Expressionless  Affect: Congruent  Level of Consciousness: Alert  Frequency of Checks: 4 times per hour, close  Mood:Normal: No  Mood: Depressed, Anxious  Motor Activity:Normal: Yes  Eye Contact: Fair  Observed Behavior: Cooperative  Sexual Misconduct History: Current - no  Preception: [place] to person, [place] to time, [place] to place, [place] to situation  Attention:Normal: Yes  Attention: Distractible  Thought Processes: Circumstantial  Thought Content:Normal: No  Thought Content: Preoccupations  Depression Symptoms: Feelings of hopelessess  Anxiety Symptoms: Generalized  [place] Symptoms: No problems reported or observed.  Hallucinations: Auditory (comment), Visual (comment) (sees shadows hears beeping)  Delusions: No  Memory:Normal: Yes  Memory: Poor recent  Insight and Judgment: No  Insight and Judgment: Poor judgment, Poor insight    AVS/Transition Record has been discussed with patient and a copy was given to the patient.  The AVS/Transition Record was faxed to the next level of care today.

## 2025-02-14 NOTE — PROGRESS NOTES
Group Note    Date: 02/13/25  Start Time: 7:30 PM   End Time:8:00 PM     Number of Participants: 3    Type of Group: Wrap-Up     Patient's Goal:  None listed     Notes:  See Wrap-up sheet     Status After Intervention:  Unchanged    Participation Level: Active Listener    Participation Quality: Appropriate    Speech:  normal    Thought Process/Content: Logical    Mood:  Calm    Level of consciousness:  Alert    Response to Learning: Able to verbalize current knowledge/experience    Modes of Intervention: Education and Support    Discipline Responsible: Registered Nurse     Signature:  Teofilo Espinoza RN

## 2025-02-14 NOTE — PLAN OF CARE
Problem: Safety - Adult  Goal: Free from fall injury  Outcome: Adequate for Discharge     Problem: Self Harm/Suicidality  Goal: Will have no self-injury during hospital stay  Description: INTERVENTIONS:  1.  Ensure constant observer at bedside with Q15M safety checks  2.  Maintain a safe environment  3.  Secure patient belongings  4.  Ensure family/visitors adhere to safety recommendations  5.  Ensure safety tray has been added to patient's diet order  6.  Every shift and PRN: Re-assess suicidal risk via Frequent Screener    Outcome: Adequate for Discharge     Problem: Depression  Goal: Will be euthymic at discharge  Description: INTERVENTIONS:  1. Administer medication as ordered  2. Provide emotional support via 1:1 interaction with staff  3. Encourage involvement in milieu/groups/activities  4. Monitor for social isolation  Outcome: Adequate for Discharge     Problem: Psychosis  Goal: Will report no hallucinations or delusions  Description: INTERVENTIONS:  1. Administer medication as  ordered  2. Assist with reality testing to support increasing orientation  3. Assess if patient's hallucinations or delusions are encouraging self harm or harm to others and intervene as appropriate  Outcome: Adequate for Discharge     Problem: Anxiety  Goal: Will report anxiety at manageable levels  Description: INTERVENTIONS:  1. Administer medication as ordered  2. Teach and rehearse alternative coping skills  3. Provide emotional support with 1:1 interaction with staff  Outcome: Adequate for Discharge     Problem: Sleep Disturbance  Goal: Will exhibit normal sleeping pattern  Description: INTERVENTIONS:  1. Administer medication as ordered  2. Decrease environmental stimuli, including noise, as appropriate  3. Discourage social isolation and naps during the day  Outcome: Adequate for Discharge     Problem: Discharge Planning  Goal: Discharge to home or other facility with appropriate resources  Outcome: Adequate for

## 2025-02-14 NOTE — PROGRESS NOTES
SW spoke with patient regarding safe discharge planning. Patient reports no change in discharge plans currently. Patient was provided with bus money to return home as well as a work excuse per patient request.

## 2025-02-14 NOTE — PROGRESS NOTES
Discharge Note     Patient is discharging on this date. Patient denies SI, HI, and AVH at this time. Patient reports improvement in behavior and is leaving unit in overall good condition. SW and patient discussed patient's follow up appointments and importance of attending appointments as scheduled, patient voiced understanding and agreement. Patient and SW also discussed patient's safety plan and patient was able to verbally identify: warning signs, coping strategies, places and people that help make the patient feel better/distract negative thoughts, friends/family/agencies/professionals the patient can reach out to in a crisis, and something that is important to the patient/worth living for. Patient was provided the national suicide prevention hotline number (1-523.142.9903) as well as local community behavioral health ([facility]) crisis number for emergencies (1-194.256.6012).     Discharge Disposition: home -lives alone      Pt to follow up with:  [facility] on February 14 , 2025 at 1:00 PM for the intake appointment. Patient will follow up with [facility]   On February 18 , 2025   at 8:30 AM for the medication management appointment.     Referral to outpatient tobacco cessation counseling treatment:  Patient refused referral to outpatient tobacco cessation counseling    SW offered to assist patient with transportation,  patient was provided with bus money to return home.

## 2025-02-17 ENCOUNTER — FOLLOWUP TELEPHONE ENCOUNTER (OUTPATIENT)
Dept: PSYCHIATRY | Age: 45
End: 2025-02-17